# Patient Record
Sex: MALE | Race: WHITE | NOT HISPANIC OR LATINO | Employment: OTHER | URBAN - METROPOLITAN AREA
[De-identification: names, ages, dates, MRNs, and addresses within clinical notes are randomized per-mention and may not be internally consistent; named-entity substitution may affect disease eponyms.]

---

## 2022-09-26 ENCOUNTER — HOSPITAL ENCOUNTER (EMERGENCY)
Facility: HOSPITAL | Age: 52
Discharge: HOME | End: 2022-09-27
Attending: STUDENT IN AN ORGANIZED HEALTH CARE EDUCATION/TRAINING PROGRAM
Payer: MEDICARE

## 2022-09-26 DIAGNOSIS — M54.40 ACUTE LOW BACK PAIN WITH SCIATICA, SCIATICA LATERALITY UNSPECIFIED, UNSPECIFIED BACK PAIN LATERALITY: Primary | ICD-10-CM

## 2022-09-26 DIAGNOSIS — G89.29 CHRONIC BACK PAIN, UNSPECIFIED BACK LOCATION, UNSPECIFIED BACK PAIN LATERALITY: ICD-10-CM

## 2022-09-26 DIAGNOSIS — M54.9 CHRONIC BACK PAIN, UNSPECIFIED BACK LOCATION, UNSPECIFIED BACK PAIN LATERALITY: ICD-10-CM

## 2022-09-26 PROCEDURE — 99284 EMERGENCY DEPT VISIT MOD MDM: CPT | Mod: 25

## 2022-09-27 VITALS
WEIGHT: 180 LBS | HEART RATE: 92 BPM | HEIGHT: 70 IN | OXYGEN SATURATION: 96 % | BODY MASS INDEX: 25.77 KG/M2 | DIASTOLIC BLOOD PRESSURE: 101 MMHG | RESPIRATION RATE: 18 BRPM | SYSTOLIC BLOOD PRESSURE: 154 MMHG | TEMPERATURE: 98 F

## 2022-09-27 PROCEDURE — 63600175 PHARM REV CODE 636 W HCPCS: Performed by: STUDENT IN AN ORGANIZED HEALTH CARE EDUCATION/TRAINING PROGRAM

## 2022-09-27 PROCEDURE — 96372 THER/PROPH/DIAG INJ SC/IM: CPT | Performed by: STUDENT IN AN ORGANIZED HEALTH CARE EDUCATION/TRAINING PROGRAM

## 2022-09-27 RX ORDER — KETOROLAC TROMETHAMINE 30 MG/ML
60 INJECTION, SOLUTION INTRAMUSCULAR; INTRAVENOUS
Status: COMPLETED | OUTPATIENT
Start: 2022-09-27 | End: 2022-09-27

## 2022-09-27 RX ORDER — METHOCARBAMOL 500 MG/1
1000 TABLET, FILM COATED ORAL
Status: DISCONTINUED | OUTPATIENT
Start: 2022-09-27 | End: 2022-09-27 | Stop reason: HOSPADM

## 2022-09-27 RX ORDER — LIDOCAINE 50 MG/G
1 PATCH TOPICAL DAILY
Qty: 7 PATCH | Refills: 0 | Status: SHIPPED | OUTPATIENT
Start: 2022-09-27 | End: 2022-10-04

## 2022-09-27 RX ORDER — NAPROXEN 500 MG/1
500 TABLET ORAL 2 TIMES DAILY WITH MEALS
Qty: 60 TABLET | Refills: 0 | Status: SHIPPED | OUTPATIENT
Start: 2022-09-27

## 2022-09-27 RX ORDER — CYCLOBENZAPRINE HCL 10 MG
10 TABLET ORAL 3 TIMES DAILY PRN
Qty: 15 TABLET | Refills: 0 | Status: SHIPPED | OUTPATIENT
Start: 2022-09-27 | End: 2022-10-02

## 2022-09-27 RX ADMIN — KETOROLAC TROMETHAMINE 60 MG: 30 INJECTION, SOLUTION INTRAMUSCULAR at 01:09

## 2022-09-27 NOTE — ED PROVIDER NOTES
Encounter Date: 9/26/2022    SCRIBE #1 NOTE: I, Lisa Hall, am scribing for, and in the presence of,  Kasi Carreon IV, MD. I have scribed the following portions of the note - Other sections scribed: HPI, ROS, PE.     History     Chief Complaint   Patient presents with    Back Pain     Complaints of L lower back pain, radiating down L leg. Hx of spinal cord injury. Denies loss of bowel/bladder function     52 year old male wit hx of chronic back pain, chronic thoracic spinal compression deformities, and remote back surgery presents to ED with c/o back pain since last week. Pt described the pain as a burning sensation that radiates down his L leg that he has never experienced before. He state's its eased mildly by laying down and taking ibuprofen/tylenol. He denies any new weakness or urinary/bowel incontinence. He walks with a cane all the time.     No urinary retention or incontinence. No fecal incontinence or constipation. No saddle or perineal numbness. No recent surgeries, instrumentation, or injections involving the back. No history of IV drug use or cancer. No history of fevers or TB infection. Denies trauma.      The history is provided by the patient. No  was used.   Back Pain   This is a new problem. Illness onset: Last week. The problem has been unchanged. The quality of the pain is described as burning. The pain radiates to the left leg. Pertinent negatives include no chest pain, no fever, no abdominal pain, no dysuria and no weakness.   Review of patient's allergies indicates:  No Known Allergies  Past Medical History:   Diagnosis Date    Basal cell carcinoma     Nose    Cord compression     Herniated disc, cervical     Thoracic spinal stenosis      Past Surgical History:   Procedure Laterality Date    BACK SURGERY      LAMINECTOMY      T10-T12     Family History   Problem Relation Age of Onset    Arthritis Mother     Cancer Mother     COPD Mother     Cancer Father      Social  History     Tobacco Use    Smoking status: Every Day     Packs/day: 1.00     Types: Cigarettes   Substance Use Topics    Alcohol use: Yes     Alcohol/week: 3.0 standard drinks     Types: 3 Cans of beer per week    Drug use: No     Review of Systems   Constitutional:  Negative for chills and fever.   HENT:  Negative for congestion, rhinorrhea and sore throat.    Eyes:  Negative for visual disturbance.   Respiratory:  Negative for cough and shortness of breath.    Cardiovascular:  Negative for chest pain.   Gastrointestinal:  Negative for abdominal pain, nausea and vomiting.   Genitourinary:  Negative for dysuria and hematuria.   Musculoskeletal:  Positive for back pain. Negative for joint swelling.   Skin:  Negative for rash.   Neurological:  Negative for weakness.   Psychiatric/Behavioral:  Negative for confusion.    All other systems reviewed and are negative.    Physical Exam     Initial Vitals [09/26/22 2320]   BP Pulse Resp Temp SpO2   (!) 147/85 90 17 98.2 °F (36.8 °C) 96 %      MAP       --         Physical Exam    Nursing note and vitals reviewed.  Constitutional: He is not diaphoretic. No distress.   HENT:   Head: Normocephalic and atraumatic.   Eyes: EOM are normal. Pupils are equal, round, and reactive to light.   Neck: Neck supple.   Normal range of motion.  Pulmonary/Chest: No respiratory distress.   Abdominal: Abdomen is soft. He exhibits no distension. There is no abdominal tenderness.   Musculoskeletal:         General: Tenderness (paraspinal left lower back) present. No edema. Normal range of motion.      Cervical back: Normal range of motion and neck supple.      Comments: Pt is ambulating at baseline with a cane.      Neurological: He is alert and oriented to person, place, and time. He has normal strength. No cranial nerve deficit.   Skin: Skin is warm. No rash noted.   Psychiatric: He has a normal mood and affect.       ED Course   Procedures  Labs Reviewed - No data to display       Imaging  Results    None          Medications   ketorolac injection 60 mg (60 mg Intramuscular Given 9/27/22 0147)     Medical Decision Making:   History:   Old Medical Records: I decided to obtain old medical records.  Initial Assessment:   Chronic back pain - presenting with L paraspinal pain radiating down L leg. No weakness, incontinence, other red flags. Falling asleep in the room, pain controlled in ED. At baseline neurologically, ambulating with cane as is normal. Will discharge with pain regimen. Return precautions given.    Differential Diagnosis:   MSK pain, radiculopathy, chronic pain  ED Management:  IM toradol         Scribe Attestation:   Scribe #1: I performed the above scribed service and the documentation accurately describes the services I performed. I attest to the accuracy of the note.    Attending Attestation:           Physician Attestation for Scribe:  Physician Attestation Statement for Scribe #1: I, Kasi Carreon IV, MD, reviewed documentation, as scribed by Lisa Hall in my presence, and it is both accurate and complete.             ED Course as of 09/27/22 0530   Tue Sep 27, 2022   0238 Pain improved. Resting comfortably. Will discharge.  [AC]      ED Course User Index  [AC] Kasi Carreon IV, MD                 Clinical Impression:   Final diagnoses:  [M54.40] Acute low back pain with sciatica, sciatica laterality unspecified, unspecified back pain laterality (Primary)  [M54.9, G89.29] Chronic back pain, unspecified back location, unspecified back pain laterality        ED Disposition Condition    Discharge Stable          ED Prescriptions       Medication Sig Dispense Start Date End Date Auth. Provider    cyclobenzaprine (FLEXERIL) 10 MG tablet Take 1 tablet (10 mg total) by mouth 3 (three) times daily as needed for Muscle spasms. 15 tablet 9/27/2022 10/2/2022 Kasi Carreon IV, MD    LIDOcaine (LIDODERM) 5 % Place 1 patch onto the skin once daily. Remove & Discard patch within 12 hours or  as directed by MD for 7 days 7 patch 9/27/2022 10/4/2022 Kasi Carreon IV, MD    naproxen (NAPROSYN) 500 MG tablet Take 1 tablet (500 mg total) by mouth 2 (two) times daily with meals. 60 tablet 9/27/2022 -- Kasi Carreon IV, MD          Follow-up Information       Follow up With Specialties Details Why Contact Info    [facility] - Emergency Dept Emergency Medicine Go to  If symptoms worsen [address] [facility] [id number] [phone]    PCP  Schedule an appointment as soon as possible for a visit           I, Kasi Carreon MD personally performed the history, PE, MDM, and procedures as documented above and agree with the scribe's documentation.        Kasi Carreon IV, MD  09/27/22 0511

## 2022-10-22 ENCOUNTER — HOSPITAL ENCOUNTER (EMERGENCY)
Facility: HOSPITAL | Age: 52
Discharge: HOME | End: 2022-10-22
Attending: STUDENT IN AN ORGANIZED HEALTH CARE EDUCATION/TRAINING PROGRAM

## 2022-10-22 VITALS
SYSTOLIC BLOOD PRESSURE: 158 MMHG | TEMPERATURE: 98 F | OXYGEN SATURATION: 98 % | BODY MASS INDEX: 25.25 KG/M2 | DIASTOLIC BLOOD PRESSURE: 103 MMHG | RESPIRATION RATE: 24 BRPM | WEIGHT: 176.38 LBS | HEART RATE: 99 BPM | HEIGHT: 70 IN

## 2022-10-22 DIAGNOSIS — N39.0 URINARY TRACT INFECTION WITHOUT HEMATURIA, SITE UNSPECIFIED: ICD-10-CM

## 2022-10-22 DIAGNOSIS — N50.812 PAIN IN LEFT TESTICLE: ICD-10-CM

## 2022-10-22 DIAGNOSIS — G89.29 CHRONIC BILATERAL LOW BACK PAIN WITHOUT SCIATICA: Primary | ICD-10-CM

## 2022-10-22 DIAGNOSIS — M54.50 CHRONIC BILATERAL LOW BACK PAIN WITHOUT SCIATICA: Primary | ICD-10-CM

## 2022-10-22 LAB
ALBUMIN SERPL-MCNC: 4.2 GM/DL (ref 3.5–5)
ALBUMIN/GLOB SERPL: 1.4 RATIO (ref 1.1–2)
ALP SERPL-CCNC: 69 UNIT/L (ref 40–150)
ALT SERPL-CCNC: 28 UNIT/L (ref 0–55)
AMPHET UR QL SCN: POSITIVE
APPEARANCE UR: CLEAR
AST SERPL-CCNC: 29 UNIT/L (ref 5–34)
BACTERIA #/AREA URNS AUTO: ABNORMAL /HPF
BARBITURATE SCN PRESENT UR: NEGATIVE
BASOPHILS # BLD AUTO: 0.04 X10(3)/MCL (ref 0–0.2)
BASOPHILS NFR BLD AUTO: 0.4 %
BENZODIAZ UR QL SCN: NEGATIVE
BILIRUB UR QL STRIP.AUTO: NEGATIVE MG/DL
BILIRUBIN DIRECT+TOT PNL SERPL-MCNC: 0.6 MG/DL
BUN SERPL-MCNC: 15.2 MG/DL (ref 8.4–25.7)
C TRACH DNA SPEC QL NAA+PROBE: NOT DETECTED
CALCIUM SERPL-MCNC: 9.8 MG/DL (ref 8.4–10.2)
CANNABINOIDS UR QL SCN: POSITIVE
CAOX CRY URNS QL MICRO: ABNORMAL /HPF
CHLORIDE SERPL-SCNC: 100 MMOL/L (ref 98–107)
CO2 SERPL-SCNC: 23 MMOL/L (ref 22–29)
COCAINE UR QL SCN: NEGATIVE
COLOR UR AUTO: YELLOW
CREAT SERPL-MCNC: 0.98 MG/DL (ref 0.73–1.18)
EOSINOPHIL # BLD AUTO: 0.01 X10(3)/MCL (ref 0–0.9)
EOSINOPHIL NFR BLD AUTO: 0.1 %
ERYTHROCYTE [DISTWIDTH] IN BLOOD BY AUTOMATED COUNT: 13.9 % (ref 11.5–17)
FENTANYL UR QL SCN: POSITIVE
FLUAV AG UPPER RESP QL IA.RAPID: NOT DETECTED
FLUBV AG UPPER RESP QL IA.RAPID: NOT DETECTED
GFR SERPLBLD CREATININE-BSD FMLA CKD-EPI: >60 MLS/MIN/1.73/M2
GLOBULIN SER-MCNC: 3 GM/DL (ref 2.4–3.5)
GLUCOSE SERPL-MCNC: 100 MG/DL (ref 74–100)
GLUCOSE UR QL STRIP.AUTO: NORMAL MG/DL
HCT VFR BLD AUTO: 44.3 % (ref 42–52)
HGB BLD-MCNC: 14.5 GM/DL (ref 14–18)
HYALINE CASTS #/AREA URNS LPF: ABNORMAL /LPF
IMM GRANULOCYTES # BLD AUTO: 0.05 X10(3)/MCL (ref 0–0.04)
IMM GRANULOCYTES NFR BLD AUTO: 0.4 %
KETONES UR QL STRIP.AUTO: ABNORMAL MG/DL
LEUKOCYTE ESTERASE UR QL STRIP.AUTO: 25 UNIT/L
LYMPHOCYTES # BLD AUTO: 1.21 X10(3)/MCL (ref 0.6–4.6)
LYMPHOCYTES NFR BLD AUTO: 10.7 %
MCH RBC QN AUTO: 30.8 PG (ref 27–31)
MCHC RBC AUTO-ENTMCNC: 32.7 MG/DL (ref 33–36)
MCV RBC AUTO: 94.1 FL (ref 80–94)
MDMA UR QL SCN: NEGATIVE
MONOCYTES # BLD AUTO: 0.73 X10(3)/MCL (ref 0.1–1.3)
MONOCYTES NFR BLD AUTO: 6.5 %
MUCOUS THREADS URNS QL MICRO: ABNORMAL /LPF
N GONORRHOEA DNA SPEC QL NAA+PROBE: NOT DETECTED
NEUTROPHILS # BLD AUTO: 9.2 X10(3)/MCL (ref 2.1–9.2)
NEUTROPHILS NFR BLD AUTO: 81.9 %
NITRITE UR QL STRIP.AUTO: NEGATIVE
NRBC BLD AUTO-RTO: 0 %
OPIATES UR QL SCN: NEGATIVE
PCP UR QL: NEGATIVE
PH UR STRIP.AUTO: 5.5 [PH]
PH UR: 5.5 [PH] (ref 3–11)
PLATELET # BLD AUTO: 233 X10(3)/MCL (ref 130–400)
PMV BLD AUTO: 11.5 FL (ref 7.4–10.4)
POTASSIUM SERPL-SCNC: 3.8 MMOL/L (ref 3.5–5.1)
PROT SERPL-MCNC: 7.2 GM/DL (ref 6.4–8.3)
PROT UR QL STRIP.AUTO: ABNORMAL MG/DL
RBC # BLD AUTO: 4.71 X10(6)/MCL (ref 4.7–6.1)
RBC #/AREA URNS AUTO: ABNORMAL /HPF
RBC UR QL AUTO: NEGATIVE UNIT/L
SARS-COV-2 RNA RESP QL NAA+PROBE: NOT DETECTED
SODIUM SERPL-SCNC: 136 MMOL/L (ref 136–145)
SP GR UR STRIP.AUTO: 1.03
SPECIFIC GRAVITY, URINE AUTO (.000) (OHS): 1.03 (ref 1–1.03)
SPERM URNS QL MICRO: ABNORMAL /HPF
SQUAMOUS #/AREA URNS LPF: ABNORMAL /HPF
UROBILINOGEN UR STRIP-ACNC: ABNORMAL MG/DL
WBC # SPEC AUTO: 11.3 X10(3)/MCL (ref 4.5–11.5)
WBC #/AREA URNS AUTO: ABNORMAL /HPF

## 2022-10-22 PROCEDURE — 87591 N.GONORRHOEAE DNA AMP PROB: CPT | Performed by: STUDENT IN AN ORGANIZED HEALTH CARE EDUCATION/TRAINING PROGRAM

## 2022-10-22 PROCEDURE — 81001 URINALYSIS AUTO W/SCOPE: CPT | Mod: 59 | Performed by: STUDENT IN AN ORGANIZED HEALTH CARE EDUCATION/TRAINING PROGRAM

## 2022-10-22 PROCEDURE — 80053 COMPREHEN METABOLIC PANEL: CPT | Performed by: STUDENT IN AN ORGANIZED HEALTH CARE EDUCATION/TRAINING PROGRAM

## 2022-10-22 PROCEDURE — 80307 DRUG TEST PRSMV CHEM ANLYZR: CPT | Performed by: STUDENT IN AN ORGANIZED HEALTH CARE EDUCATION/TRAINING PROGRAM

## 2022-10-22 PROCEDURE — 99285 EMERGENCY DEPT VISIT HI MDM: CPT | Mod: 25

## 2022-10-22 PROCEDURE — 63600175 PHARM REV CODE 636 W HCPCS: Performed by: STUDENT IN AN ORGANIZED HEALTH CARE EDUCATION/TRAINING PROGRAM

## 2022-10-22 PROCEDURE — 96372 THER/PROPH/DIAG INJ SC/IM: CPT | Mod: 59 | Performed by: STUDENT IN AN ORGANIZED HEALTH CARE EDUCATION/TRAINING PROGRAM

## 2022-10-22 PROCEDURE — 96361 HYDRATE IV INFUSION ADD-ON: CPT

## 2022-10-22 PROCEDURE — 25500020 PHARM REV CODE 255: Performed by: STUDENT IN AN ORGANIZED HEALTH CARE EDUCATION/TRAINING PROGRAM

## 2022-10-22 PROCEDURE — 0241U COVID/FLU A&B PCR: CPT | Performed by: STUDENT IN AN ORGANIZED HEALTH CARE EDUCATION/TRAINING PROGRAM

## 2022-10-22 PROCEDURE — 96374 THER/PROPH/DIAG INJ IV PUSH: CPT | Mod: 59

## 2022-10-22 PROCEDURE — 36415 COLL VENOUS BLD VENIPUNCTURE: CPT | Performed by: STUDENT IN AN ORGANIZED HEALTH CARE EDUCATION/TRAINING PROGRAM

## 2022-10-22 PROCEDURE — 85025 COMPLETE CBC W/AUTO DIFF WBC: CPT | Performed by: STUDENT IN AN ORGANIZED HEALTH CARE EDUCATION/TRAINING PROGRAM

## 2022-10-22 PROCEDURE — 25000003 PHARM REV CODE 250: Performed by: STUDENT IN AN ORGANIZED HEALTH CARE EDUCATION/TRAINING PROGRAM

## 2022-10-22 PROCEDURE — 63700000 PHARM REV CODE 250 ALT 637 W/O HCPCS: Performed by: STUDENT IN AN ORGANIZED HEALTH CARE EDUCATION/TRAINING PROGRAM

## 2022-10-22 PROCEDURE — 87491 CHLMYD TRACH DNA AMP PROBE: CPT | Performed by: STUDENT IN AN ORGANIZED HEALTH CARE EDUCATION/TRAINING PROGRAM

## 2022-10-22 RX ORDER — CEPHALEXIN 500 MG/1
500 CAPSULE ORAL 4 TIMES DAILY
Qty: 20 CAPSULE | Refills: 0 | Status: SHIPPED | OUTPATIENT
Start: 2022-10-22 | End: 2022-10-27

## 2022-10-22 RX ORDER — ACETAMINOPHEN 500 MG
1000 TABLET ORAL
Status: COMPLETED | OUTPATIENT
Start: 2022-10-22 | End: 2022-10-22

## 2022-10-22 RX ORDER — AZITHROMYCIN 250 MG/1
1000 TABLET, FILM COATED ORAL
Status: COMPLETED | OUTPATIENT
Start: 2022-10-22 | End: 2022-10-22

## 2022-10-22 RX ORDER — CEFTRIAXONE 500 MG/1
500 INJECTION, POWDER, FOR SOLUTION INTRAMUSCULAR; INTRAVENOUS
Status: COMPLETED | OUTPATIENT
Start: 2022-10-22 | End: 2022-10-22

## 2022-10-22 RX ORDER — KETOROLAC TROMETHAMINE 30 MG/ML
15 INJECTION, SOLUTION INTRAMUSCULAR; INTRAVENOUS
Status: COMPLETED | OUTPATIENT
Start: 2022-10-22 | End: 2022-10-22

## 2022-10-22 RX ORDER — HYDROCODONE BITARTRATE AND ACETAMINOPHEN 5; 325 MG/1; MG/1
1 TABLET ORAL EVERY 6 HOURS PRN
Qty: 10 TABLET | Refills: 0 | Status: SHIPPED | OUTPATIENT
Start: 2022-10-22

## 2022-10-22 RX ADMIN — SODIUM CHLORIDE 1000 ML: 9 INJECTION, SOLUTION INTRAVENOUS at 10:10

## 2022-10-22 RX ADMIN — ACETAMINOPHEN 1000 MG: 500 TABLET ORAL at 08:10

## 2022-10-22 RX ADMIN — KETOROLAC TROMETHAMINE 15 MG: 30 INJECTION, SOLUTION INTRAMUSCULAR at 08:10

## 2022-10-22 RX ADMIN — AZITHROMYCIN MONOHYDRATE 1000 MG: 250 TABLET ORAL at 10:10

## 2022-10-22 RX ADMIN — IOPAMIDOL 100 ML: 755 INJECTION, SOLUTION INTRAVENOUS at 09:10

## 2022-10-22 RX ADMIN — CEFTRIAXONE SODIUM 500 MG: 500 INJECTION, POWDER, FOR SOLUTION INTRAMUSCULAR; INTRAVENOUS at 10:10

## 2022-10-23 NOTE — ED PROVIDER NOTES
"Encounter Date: 10/22/2022       History     Chief Complaint   Patient presents with    Testicle Pain    Back Pain    Psychiatric Evaluation     C/o constant pain in back, testicular pain, depression, si     52-year-old male presents to ED for chronic lower back pain and left testicle pain.  States last time he had this testicle pain he had gonorrhea. denies any dysuria, no swelling, no trauma, no right testicle involvement.  No skin changes, no lymphadenopathy. endorsing a mild suprapubic abdominal discomfort when his testicle hurts.  Denies any rectal changes. Also reports a chronic generalized back pain with previous surgeries.  No new trauma. States he used to be on pain med management to [facility] however has not seen them for a long period time.  Has not taken anything for his back pain. states in triage he made a comment that if we cannot help with his back pain he would "do something about it" referring to harming himself.  He states he was/is not suicidal, did not mean this.  He denies any hallucinations or other psychiatric complaints.  No other complaints or concerns at this time.    Review of patient's allergies indicates:  No Known Allergies  Past Medical History:   Diagnosis Date    Basal cell carcinoma     Nose    Cord compression     Herniated disc, cervical     Thoracic spinal stenosis      Past Surgical History:   Procedure Laterality Date    BACK SURGERY      LAMINECTOMY      T10-T12     Family History   Problem Relation Age of Onset    Arthritis Mother     Cancer Mother     COPD Mother     Cancer Father      Social History     Tobacco Use    Smoking status: Every Day     Packs/day: 1.00     Types: Cigarettes    Smokeless tobacco: Never   Substance Use Topics    Alcohol use: Yes     Alcohol/week: 3.0 standard drinks     Types: 3 Cans of beer per week    Drug use: No     Review of Systems   Constitutional:  Negative for chills and fever.   HENT:  Negative for congestion, rhinorrhea and sore " throat.    Eyes:  Negative for pain, discharge and itching.   Respiratory:  Negative for chest tightness and shortness of breath.    Cardiovascular:  Negative for chest pain and palpitations.   Gastrointestinal:  Negative for abdominal pain, nausea and vomiting.   Genitourinary:  Positive for testicular pain. Negative for difficulty urinating, dysuria, flank pain, hematuria, penile discharge, penile pain, penile swelling, scrotal swelling and urgency.   Musculoskeletal:  Positive for back pain. Negative for myalgias and neck pain.   Skin:  Negative for color change and rash.   Neurological:  Negative for dizziness, weakness and headaches.   Psychiatric/Behavioral:  Negative for confusion and suicidal ideas. The patient is not hyperactive.      Physical Exam     Initial Vitals [10/22/22 0740]   BP Pulse Resp Temp SpO2   (!) 158/87 99 (!) 24 97.5 °F (36.4 °C) 98 %      MAP       --         Physical Exam    Constitutional: He appears well-developed and well-nourished. He is not diaphoretic. No distress.   HENT:   Head: Normocephalic and atraumatic.   Eyes: Conjunctivae and EOM are normal. Pupils are equal, round, and reactive to light.   Neck: Neck supple. No tracheal deviation present.   Normal range of motion.  Cardiovascular:  Normal rate, regular rhythm and normal heart sounds.           Pulmonary/Chest: Breath sounds normal. No respiratory distress.   Abdominal: Abdomen is soft. There is no abdominal tenderness. Hernia confirmed negative in the right inguinal area and confirmed negative in the left inguinal area.   No right CVA tenderness.  No left CVA tenderness. There is no rebound, no guarding, no tenderness at McBurney's point and negative Aguilar's sign. negative Rovsing's sign  Genitourinary:    Penis normal.   Right testis shows no mass, no swelling and no tenderness. Right testis is descended. Cremasteric reflex is not absent on the right side. Left testis shows tenderness. Left testis shows no mass and no  swelling. Left testis is descended. Cremasteric reflex is not absent on the left side. No penile erythema or penile tenderness. No discharge found.   Musculoskeletal:         General: Normal range of motion.      Cervical back: Normal, normal range of motion and neck supple.      Thoracic back: Normal.      Lumbar back: Spasms and tenderness present. No edema, signs of trauma or bony tenderness. Normal range of motion. Negative right straight leg raise test and negative left straight leg raise test.     Lymphadenopathy: No inguinal adenopathy noted on the right or left side.   Neurological: He is alert and oriented to person, place, and time. He has normal strength. GCS score is 15. GCS eye subscore is 4. GCS verbal subscore is 5. GCS motor subscore is 6.   Skin: Skin is warm and dry. Capillary refill takes less than 2 seconds. No rash noted.   Psychiatric: He has a normal mood and affect. His speech is normal and behavior is normal. Judgment and thought content normal. He is not actively hallucinating. Thought content is not paranoid and not delusional. Cognition and memory are normal. He expresses no homicidal and no suicidal ideation. He expresses no suicidal plans and no homicidal plans. He is attentive.       ED Course   Procedures  Labs Reviewed   URINALYSIS, REFLEX TO URINE CULTURE - Abnormal; Notable for the following components:       Result Value    Protein, UA Trace (*)     Ketones, UA 1+ (*)     Urobilinogen, UA 1+ (*)     Leukocyte Esterase, UA 25 (*)     WBC, UA 6-10 (*)     Bacteria, UA Occ (*)     Mucous, UA Few (*)     Sperm, UA Trace (*)     Hyaline Casts, UA 3-5 (*)     Calcium Oxalate Crystals, UA Occ (*)     All other components within normal limits   DRUG SCREEN, URINE (BEAKER) - Abnormal; Notable for the following components:    Amphetamines, Urine Positive (*)     Cannabinoids, Urine Positive (*)     Fentanyl, Urine Positive (*)     All other components within normal limits    Narrative:      Cut off concentrations:    Amphetamines - 1000 ng/ml  Barbiturates - 200 ng/ml  Benzodiazepine - 200 ng/ml  Cannabinoids (THC) - 50 ng/ml  Cocaine - 300 ng/ml  Fentanyl - 1.0 ng/ml  MDMA - 500 ng/ml  Opiates - 300 ng/ml   Phencyclidine (PCP) - 25 ng/ml    Specimen submitted for drug analysis and tested for pH and specific gravity in order to evaluate sample integrity. Suspect tampering if specific gravity is <1.003 and/or pH is not within the range of 4.5 - 8.0  False negatives may result form substances such as bleach added to urine.  False positives may result for the presence of a substance with similar chemical structure to the drug or its metabolite.    This test provides only a PRELIMINARY analytical test result. A more specific alternate chemical method must be used in order to obtain a confirmed analytical result. Gas chromatography/mass spectrometry (GC/MS) is the preferred confirmatory method. Other chemical confirmation methods are available. Clinical consideration and professional judgement should be applied to any drug of abuse test result, particularly when preliminary positive results are used.    Positive results will be confirmed only at the physicians request. Unconfirmed screening results are to be used only for medical purposes (treatment).        CBC WITH DIFFERENTIAL - Abnormal; Notable for the following components:    MCV 94.1 (*)     MCHC 32.7 (*)     MPV 11.5 (*)     IG# 0.05 (*)     All other components within normal limits   CHLAMYDIA/GONORRHOEAE(GC), PCR - Normal    Narrative:     The Xpert CT/NG test, performed on the GeneXpert system is a qualitative in vitro real-time polymerase chain reaction (PCR) test for the automated detected and differentiation for genomic DNA from Chlamydia trachomatis (CT) and/or Neisseria gonorrhoeae (NG).   COVID/FLU A&B PCR - Normal    Narrative:     The Xpert Xpress SARS-CoV-2/FLU/RSV plus is a rapid, multiplexed real-time PCR test intended for the  simultaneous qualitative detection and differentiation of SARS-CoV-2, Influenza A, Influenza B, and respiratory syncytial virus (RSV) viral RNA in either nasopharyngeal swab or nasal swab specimens.         CBC W/ AUTO DIFFERENTIAL    Narrative:     The following orders were created for panel order CBC auto differential.  Procedure                               Abnormality         Status                     ---------                               -----------         ------                     CBC with Differential[[id number]]        Abnormal            Final result                 Please view results for these tests on the individual orders.   COMPREHENSIVE METABOLIC PANEL          Imaging Results              CT Abdomen Pelvis With Contrast (Final result)  Result time 10/22/22 10:11:52      Final result by Natanael Pereira MD (10/22/22 10:11:52)                   Narrative:    EXAMINATION  CT ABDOMEN PELVIS WITH CONTRAST    CLINICAL HISTORY  chronic back pain, lower abdominal & testiclar pain;    TECHNIQUE  Post-contrast helical-acquisition CT images were obtained and multiplanar reformats accomplished by a CT technologist at a separate workstation, pushed to PACS for physician review.    CONTRAST  *IV: ISOVUE-370, 100 mL  *Enteric: none    COMPARISON  None available at the time of initial interpretation.    FINDINGS  Images were reviewed in soft tissue, lung, and bone windows.    Exam quality: adequate for evaluation    Lines/tubes: none visualized    Visualized lower thoracic cavity is unremarkable.  Aortoiliac tapering is normal through the abdomen and pelvis, with scattered moderate burden mural plaquing and calcification.    The gallbladder and bile ducts are unremarkable.  Portal vein is widely patent.  Liver, pancreas, and spleen are normal in appearance.  There is no suspicious adrenal nodule.  Kidneys enhance in temporally symmetric fashion, with no suspicious focal lesion or radiodense calcification.  There  are no findings of distal obstructive uropathy.    Urinary bladder is mildly distended, with greater than expected diffuse mural thickening.  No focal bladder wall abnormality or convincing intraluminal findings are appreciated.  The prostate is mildly prominent with diffuse central calcification, otherwise limited assessment by CT.    No evidence of acute esophageal process.  The stomach and small bowel loops are nondistended.  The appendix is unremarkable.  There is no convincing focal abnormality of the colon.  No free intra-abdominal air or fluid is appreciated.  There are no drainable collections.    No pathologic lymph node enlargement or chronic adenopathy is evident.    There are degenerative alterations throughout the spinal column, with no evidence of acute cortical displacement or destructive skeletal lesion.  The regional subcutaneous tissues and underlying musculature are unremarkable.    IMPRESSION  1. Greater than expected thickness of the urinary bladder wall, may reflect changes of cystitis.  Correlation with pertinent clinical/laboratory details recommended.  2. Otherwise, no convincing CT evidence of acute abdominopelvic process.  3. Chronic secondary details discussed above.    RADIATION DOSE  Automated tube current modulation, weight-based exposure dosing, and/or iterative reconstruction technique utilized to reach lowest reasonably achievable exposure rate.    DLP: 289 mGy*cm      Electronically signed by: Natanael Pereira  Date:    10/22/2022  Time:    10:11                                     US Scrotum And Testicles (Final result)  Result time 10/22/22 09:17:47      Final result by Natanael Pereira MD (10/22/22 09:17:47)                   Narrative:    EXAMINATION  US SCROTUM AND TESTICLES    CLINICAL HISTORY  Pain, unspecified; left testicular pain worsening for several months.    TECHNIQUE  Grayscale and Doppler interrogation of the scrotum and testes.    COMPARISON  None available at the time  of initial interpretation.    FINDINGS  Exam quality: Limited secondary to poor patient compliance with imaging, resulting and constant motion artifact throughout the exam.    Right Testicle: Surface contour intact and smooth. No evidence of focal mass or infiltrative parenchymal abnormality.  No significant enlargement or heterogeneity of the epididymis.    Left Testicle: Surface contour intact and smooth. No evidence of focal mass or infiltrative parenchymal abnormality.  No significant enlargement or heterogeneity of the epididymis.    Doppler Interrogation:  Bilateral spontaneous arterial flow is demonstrated within each testicle, with symmetric velocity and normal low-resistance spectral waveform.  Bilateral venous outflow is maintained.    Other findings: No significant hydrocele or other abnormal fluid appreciated.  No varicocele appreciated bilaterally.      Measurements:    *Right testicle: 5.5 cm x 2.3 cm x 3.4 cm (22 cc)  *Left testicle: 4.2 cm x 2.9 cm x 2.9 cm (19 cc)    IMPRESSION  No sonographic evidence of focal testicular lesion, active torsion, or other acute process.      Electronically signed by: Natanael Pereira  Date:    10/22/2022  Time:    09:17                                     Medications   ketorolac injection 15 mg (15 mg Intravenous Given 10/22/22 0813)   acetaminophen tablet 1,000 mg (1,000 mg Oral Given 10/22/22 0813)   sodium chloride 0.9% bolus 1,000 mL (0 mLs Intravenous Stopped 10/22/22 1100)   iopamidoL (ISOVUE-370) injection 100 mL (100 mLs Intravenous Given 10/22/22 0953)   azithromycin tablet 1,000 mg (1,000 mg Oral Given 10/22/22 1050)   cefTRIAXone injection 500 mg (500 mg Intramuscular Given 10/22/22 1050)     Medical Decision Making:   Clinical Tests:   Lab Tests: Ordered and Reviewed  Radiological Study: Reviewed and Ordered  ED Management:  52-year-old male with hx of chronic back pain off pain contract presents for persistent generalized back pain and new left-sided  testicular discomfort consistent with prior gonorrhea infection.  No trauma, no discharge or drainage, no rectal complaints of historical elements consistent with prostatitis.  On arrival vitals grossly stable and in no acute distress.  Exam demonstrates mild generalized lower lumbar discomfort.  No midline pain or evidence of trauma.  Lower extremities neurovascularly intact.   exam benign besides very mild left testicular discomfort.  Labs demonstrated obvious urinary tract infection and multiple drugs on UDS.  No leukocytosis. CT abdomen and testicular ultrasound full both negative acute. provided pain medication in department and prophylactic GC coverage prior to return of PCR (neg). He did make fleeting remarks of self-harm which he quickly retracted after he stated he used times get back faster.  Prescribed urinary antibiotics and several prescribed pain medicines. Ultimately requires close PCP follow-up. Provided strict return precautions which he voiced understanding to and stable for discharge. (Marie)                         Clinical Impression:   Final diagnoses:  [M54.50, G89.29] Chronic bilateral low back pain without sciatica (Primary)  [N50.812] Pain in left testicle  [N39.0] Urinary tract infection without hematuria, site unspecified        ED Disposition Condition    Discharge Stable          ED Prescriptions       Medication Sig Dispense Start Date End Date Auth. Provider    cephALEXin (KEFLEX) 500 MG capsule () Take 1 capsule (500 mg total) by mouth 4 (four) times daily. for 5 days 20 capsule 10/22/2022 10/27/2022 Justin Salazar MD    HYDROcodone-acetaminophen (NORCO) 5-325 mg per tablet Take 1 tablet by mouth every 6 (six) hours as needed for Pain. 10 tablet 10/22/2022 -- Justin Salazar MD          Follow-up Information       Follow up With Specialties Details Why Contact Info    [facility] - Emergency Dept Emergency Medicine  As needed, If symptoms worsen [address]  [place] [id number]  [phone]    PCP  Schedule an appointment as soon as possible for a visit in 3 days               Justin Salazar MD  11/04/22 1948

## 2022-12-09 ENCOUNTER — HOSPITAL ENCOUNTER (EMERGENCY)
Facility: HOSPITAL | Age: 52
Discharge: HOME | End: 2022-12-10
Attending: EMERGENCY MEDICINE

## 2022-12-09 DIAGNOSIS — M25.552 LEFT HIP PAIN: Primary | ICD-10-CM

## 2022-12-09 PROCEDURE — 99283 EMERGENCY DEPT VISIT LOW MDM: CPT | Mod: 25

## 2022-12-10 VITALS
TEMPERATURE: 98 F | WEIGHT: 180 LBS | DIASTOLIC BLOOD PRESSURE: 74 MMHG | OXYGEN SATURATION: 98 % | SYSTOLIC BLOOD PRESSURE: 116 MMHG | HEIGHT: 70 IN | RESPIRATION RATE: 19 BRPM | HEART RATE: 94 BPM | BODY MASS INDEX: 25.77 KG/M2

## 2022-12-10 PROCEDURE — 25000003 PHARM REV CODE 250: Performed by: EMERGENCY MEDICINE

## 2022-12-10 RX ORDER — HYDROCODONE BITARTRATE AND ACETAMINOPHEN 5; 325 MG/1; MG/1
1 TABLET ORAL
Status: COMPLETED | OUTPATIENT
Start: 2022-12-10 | End: 2022-12-10

## 2022-12-10 RX ORDER — MELOXICAM 15 MG/1
15 TABLET ORAL DAILY
Qty: 20 TABLET | Refills: 0 | Status: SHIPPED | OUTPATIENT
Start: 2022-12-10 | End: 2022-12-30

## 2022-12-10 RX ADMIN — HYDROCODONE BITARTRATE AND ACETAMINOPHEN 1 TABLET: 5; 325 TABLET ORAL at 04:12

## 2022-12-10 RX ADMIN — IBUPROFEN 800 MG: 200 TABLET, FILM COATED ORAL at 04:12

## 2022-12-10 NOTE — ED PROVIDER NOTES
"Encounter Date: 12/9/2022    SCRIBE #1 NOTE: I, Vonnie Cade, am scribing for, and in the presence of,  Ted Bunch MD. I have scribed the following portions of the note - Other sections scribed: HPI, ROS, PE.     History     Chief Complaint   Patient presents with    Hip Pain     Patient sustained a fall a few day ago.  Complaint of pain, swelling, bruising to left hip.  States can only walk a few steps.       52 year old male with hx of a spinal cord injury presents to the ED for hip pain onset a few days ago. Pt reports he recently fell onto his L hip. He complains of progressive swelling and bruising of his hip. He reports his L leg "feels like it is on fire" when he puts weight onto it. No other complaints.     The history is provided by the patient. No  was used.   Hip Pain  This is a new problem. The current episode started more than 2 days ago. The problem has been gradually worsening. Pertinent negatives include no chest pain, no abdominal pain, no headaches and no shortness of breath. The symptoms are aggravated by walking. He has tried nothing for the symptoms. The treatment provided no relief.   Review of patient's allergies indicates:  No Known Allergies  Past Medical History:   Diagnosis Date    Basal cell carcinoma     Nose    Cord compression     Herniated disc, cervical     Thoracic spinal stenosis      Past Surgical History:   Procedure Laterality Date    BACK SURGERY      LAMINECTOMY      T10-T12     Family History   Problem Relation Age of Onset    Arthritis Mother     Cancer Mother     COPD Mother     Cancer Father      Social History     Tobacco Use    Smoking status: Every Day     Packs/day: 1.00     Types: Cigarettes    Smokeless tobacco: Never   Substance Use Topics    Alcohol use: Yes     Alcohol/week: 3.0 standard drinks     Types: 3 Cans of beer per week    Drug use: No     Review of Systems   Constitutional:  Negative for chills and fever.   HENT:  Negative " for congestion and ear pain.    Eyes:  Negative for discharge.   Respiratory:  Negative for cough, shortness of breath and wheezing.    Cardiovascular:  Negative for chest pain and leg swelling.   Gastrointestinal:  Negative for abdominal pain, constipation, diarrhea, nausea and vomiting.   Genitourinary:  Negative for dysuria, flank pain and frequency.   Musculoskeletal:  Negative for back pain and joint swelling.        L hip pain, swelling, and bruising.   Skin:  Negative for rash.   Neurological:  Negative for dizziness, weakness and headaches.   Psychiatric/Behavioral:  Negative for agitation, confusion and hallucinations.      Physical Exam     Initial Vitals [12/09/22 2311]   BP Pulse Resp Temp SpO2   116/74 94 20 98.4 °F (36.9 °C) 98 %      MAP       --         Physical Exam    Constitutional: He appears well-developed and well-nourished. No distress.   HENT:   Head: Normocephalic and atraumatic.   Eyes: Conjunctivae and EOM are normal. Pupils are equal, round, and reactive to light. Right eye exhibits no discharge. Left eye exhibits no discharge. No scleral icterus.   Neck: No tracheal deviation present.   Cardiovascular:  Normal rate, regular rhythm, normal heart sounds and intact distal pulses.           No murmur heard.  Pulmonary/Chest: Breath sounds normal. No stridor. No respiratory distress. He has no wheezes. He has no rales.   Good pulses of LLE.    Abdominal: Abdomen is soft. He exhibits no distension. There is no abdominal tenderness. There is no rebound and no guarding.   Musculoskeletal:         General: No tenderness or edema. Normal range of motion.      Comments: Large hematoma and bruising over L hip. Full ROM of hip. Able to flex and extend.      Neurological: He is alert and oriented to person, place, and time. He has normal strength and normal reflexes. No cranial nerve deficit.   Skin: Skin is warm and dry. No rash noted. No erythema. No pallor.   Psychiatric: He has a normal mood and  affect. His behavior is normal. Judgment and thought content normal.       ED Course   Procedures  Labs Reviewed - No data to display       Imaging Results              X-Ray Hip 2 or 3 views Left (with Pelvis when performed) (Final result)  Result time 12/10/22 10:02:57      Final result by Kendal Emery MD (12/10/22 10:02:57)                   Impression:      No acute osseous abnormality.      Electronically signed by: Kendal Emery  Date:    12/10/2022  Time:    10:02               Narrative:    EXAMINATION:  XR HIP WITH PELVIS WHEN PERFORMED, 2 OR 3 VIEWS LEFT    CLINICAL HISTORY:  fall;    TECHNIQUE:  AP view of the pelvis and AP and frog leg lateral view of the left hip were performed.    COMPARISON:  None.    FINDINGS:  BONE: No fracture. No dislocation.    SOFT TISSUES: Unremarkable.                                    X-Rays:   Independently Interpreted Readings:   Other Readings:  X-Ray  Negative  Medications   HYDROcodone-acetaminophen 5-325 mg per tablet 1 tablet (1 tablet Oral Given 12/10/22 0402)   ibuprofen tablet 800 mg (800 mg Oral Given 12/10/22 0403)              Scribe Attestation:   Scribe #1: I performed the above scribed service and the documentation accurately describes the services I performed. I attest to the accuracy of the note.    Attending Attestation:           Physician Attestation for Scribe:  Physician Attestation Statement for Scribe #1: I, Ted Bunch MD, reviewed documentation, as scribed by Vonnie Cade in my presence, and it is both accurate and complete.           ED Course as of 12/11/22 0650   Sat Dec 10, 2022   0410 Pt ambulating in ER without assistance [NL]      ED Course User Index  [NL] Ted Bunch MD                 Clinical Impression:   Final diagnoses:  [M25.552] Left hip pain (Primary)        ED Disposition Condition    Discharge Stable          ED Prescriptions       Medication Sig Dispense Start Date End Date Auth. Provider     meloxicam (MOBIC) 15 MG tablet Take 1 tablet (15 mg total) by mouth once daily. for 20 days 20 tablet 12/10/2022 12/30/2022 Ted Bunch MD          Follow-up Information       Follow up With Specialties Details Why Contact Info    PCP  Call in 1 day  follow up with PCP ni 1-2 days             Ted Bunch MD  12/11/22 3576

## 2022-12-22 ENCOUNTER — HOSPITAL ENCOUNTER (EMERGENCY)
Facility: HOSPITAL | Age: 52
Discharge: HOME | End: 2022-12-22
Attending: EMERGENCY MEDICINE

## 2022-12-22 VITALS
BODY MASS INDEX: 23.99 KG/M2 | SYSTOLIC BLOOD PRESSURE: 122 MMHG | DIASTOLIC BLOOD PRESSURE: 82 MMHG | WEIGHT: 167.56 LBS | TEMPERATURE: 98 F | HEIGHT: 70 IN | HEART RATE: 85 BPM | RESPIRATION RATE: 16 BRPM | OXYGEN SATURATION: 100 %
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
## 2022-12-22 DIAGNOSIS — S16.1XXA ACUTE STRAIN OF NECK MUSCLE, INITIAL ENCOUNTER: ICD-10-CM

## 2022-12-22 DIAGNOSIS — S00.83XA CONTUSION OF FACE, INITIAL ENCOUNTER: ICD-10-CM

## 2022-12-22 DIAGNOSIS — R05.9 COUGH: ICD-10-CM

## 2022-12-22 PROCEDURE — 99284 EMERGENCY DEPT VISIT MOD MDM: CPT | Mod: 25
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
## 2023-12-12 ENCOUNTER — HOSPITAL ENCOUNTER (EMERGENCY)
Facility: HOSPITAL | Age: 53
Discharge: HOME | End: 2023-12-12
Attending: EMERGENCY MEDICINE
Payer: MEDICARE

## 2023-12-12 VITALS
BODY MASS INDEX: 26.48 KG/M2 | TEMPERATURE: 97 F | WEIGHT: 185 LBS | RESPIRATION RATE: 16 BRPM | HEIGHT: 70 IN | OXYGEN SATURATION: 98 % | HEART RATE: 82 BPM | DIASTOLIC BLOOD PRESSURE: 94 MMHG | SYSTOLIC BLOOD PRESSURE: 143 MMHG

## 2023-12-12 DIAGNOSIS — J06.9 VIRAL URI WITH COUGH: Primary | ICD-10-CM

## 2023-12-12 DIAGNOSIS — R07.9 CHEST PAIN: ICD-10-CM

## 2023-12-12 LAB
ALBUMIN SERPL-MCNC: 4.5 G/DL (ref 3.5–5)
ALBUMIN/GLOB SERPL: 1.5 RATIO (ref 1.1–2)
ALP SERPL-CCNC: 90 UNIT/L (ref 40–150)
ALT SERPL-CCNC: 13 UNIT/L (ref 0–55)
AST SERPL-CCNC: 20 UNIT/L (ref 5–34)
BASOPHILS # BLD AUTO: 0.03 X10(3)/MCL
BASOPHILS NFR BLD AUTO: 0.2 %
BILIRUB SERPL-MCNC: 0.4 MG/DL
BNP BLD-MCNC: <10 PG/ML
BUN SERPL-MCNC: 14.4 MG/DL (ref 8.4–25.7)
CALCIUM SERPL-MCNC: 10.4 MG/DL (ref 8.4–10.2)
CHLORIDE SERPL-SCNC: 102 MMOL/L (ref 98–107)
CO2 SERPL-SCNC: 26 MMOL/L (ref 22–29)
CREAT SERPL-MCNC: 0.93 MG/DL (ref 0.73–1.18)
EOSINOPHIL # BLD AUTO: 0.11 X10(3)/MCL (ref 0–0.9)
EOSINOPHIL NFR BLD AUTO: 0.8 %
ERYTHROCYTE [DISTWIDTH] IN BLOOD BY AUTOMATED COUNT: 13.6 % (ref 11.5–17)
FLUAV AG UPPER RESP QL IA.RAPID: NOT DETECTED
FLUBV AG UPPER RESP QL IA.RAPID: NOT DETECTED
GFR SERPLBLD CREATININE-BSD FMLA CKD-EPI: >60 MLS/MIN/1.73/M2
GLOBULIN SER-MCNC: 3.1 GM/DL (ref 2.4–3.5)
GLUCOSE SERPL-MCNC: 98 MG/DL (ref 74–100)
HCT VFR BLD AUTO: 48.3 % (ref 42–52)
HGB BLD-MCNC: 16.2 G/DL (ref 14–18)
IMM GRANULOCYTES # BLD AUTO: 0.05 X10(3)/MCL (ref 0–0.04)
IMM GRANULOCYTES NFR BLD AUTO: 0.4 %
LYMPHOCYTES # BLD AUTO: 1.56 X10(3)/MCL (ref 0.6–4.6)
LYMPHOCYTES NFR BLD AUTO: 11.7 %
MCH RBC QN AUTO: 30.7 PG (ref 27–31)
MCHC RBC AUTO-ENTMCNC: 33.5 G/DL (ref 33–36)
MCV RBC AUTO: 91.7 FL (ref 80–94)
MONOCYTES # BLD AUTO: 1.02 X10(3)/MCL (ref 0.1–1.3)
MONOCYTES NFR BLD AUTO: 7.7 %
NEUTROPHILS # BLD AUTO: 10.52 X10(3)/MCL (ref 2.1–9.2)
NEUTROPHILS NFR BLD AUTO: 79.2 %
NRBC BLD AUTO-RTO: 0 %
PLATELET # BLD AUTO: 184 X10(3)/MCL (ref 130–400)
PMV BLD AUTO: 12.5 FL (ref 7.4–10.4)
POTASSIUM SERPL-SCNC: 4.6 MMOL/L (ref 3.5–5.1)
PROT SERPL-MCNC: 7.6 GM/DL (ref 6.4–8.3)
RBC # BLD AUTO: 5.27 X10(6)/MCL (ref 4.7–6.1)
SARS-COV-2 RNA RESP QL NAA+PROBE: NOT DETECTED
SODIUM SERPL-SCNC: 135 MMOL/L (ref 136–145)
TROPONIN I SERPL-MCNC: <0.01 NG/ML (ref 0–0.04)
WBC # SPEC AUTO: 13.29 X10(3)/MCL (ref 4.5–11.5)

## 2023-12-12 PROCEDURE — 25000242 PHARM REV CODE 250 ALT 637 W/ HCPCS: Performed by: NURSE PRACTITIONER

## 2023-12-12 PROCEDURE — 99900035 HC TECH TIME PER 15 MIN (STAT)

## 2023-12-12 PROCEDURE — 94640 AIRWAY INHALATION TREATMENT: CPT

## 2023-12-12 PROCEDURE — 80053 COMPREHEN METABOLIC PANEL: CPT | Performed by: PHYSICIAN ASSISTANT

## 2023-12-12 PROCEDURE — 85025 COMPLETE CBC W/AUTO DIFF WBC: CPT | Performed by: PHYSICIAN ASSISTANT

## 2023-12-12 PROCEDURE — 84484 ASSAY OF TROPONIN QUANT: CPT | Performed by: PHYSICIAN ASSISTANT

## 2023-12-12 PROCEDURE — 96372 THER/PROPH/DIAG INJ SC/IM: CPT | Performed by: NURSE PRACTITIONER

## 2023-12-12 PROCEDURE — 83880 ASSAY OF NATRIURETIC PEPTIDE: CPT | Performed by: PHYSICIAN ASSISTANT

## 2023-12-12 PROCEDURE — 63600175 PHARM REV CODE 636 W HCPCS: Performed by: NURSE PRACTITIONER

## 2023-12-12 PROCEDURE — 94761 N-INVAS EAR/PLS OXIMETRY MLT: CPT

## 2023-12-12 PROCEDURE — 93005 ELECTROCARDIOGRAM TRACING: CPT

## 2023-12-12 PROCEDURE — 0240U COVID/FLU A&B PCR: CPT | Performed by: PHYSICIAN ASSISTANT

## 2023-12-12 PROCEDURE — 99285 EMERGENCY DEPT VISIT HI MDM: CPT | Mod: 25

## 2023-12-12 RX ORDER — KETOROLAC TROMETHAMINE 30 MG/ML
30 INJECTION, SOLUTION INTRAMUSCULAR; INTRAVENOUS
Status: COMPLETED | OUTPATIENT
Start: 2023-12-12 | End: 2023-12-12

## 2023-12-12 RX ORDER — ALBUTEROL SULFATE 90 UG/1
1-2 AEROSOL, METERED RESPIRATORY (INHALATION) EVERY 6 HOURS PRN
Qty: 18 G | Refills: 0 | Status: SHIPPED | OUTPATIENT
Start: 2023-12-12 | End: 2024-12-11

## 2023-12-12 RX ORDER — IPRATROPIUM BROMIDE AND ALBUTEROL SULFATE 2.5; .5 MG/3ML; MG/3ML
3 SOLUTION RESPIRATORY (INHALATION)
Status: COMPLETED | OUTPATIENT
Start: 2023-12-12 | End: 2023-12-12

## 2023-12-12 RX ORDER — PROMETHAZINE HYDROCHLORIDE AND DEXTROMETHORPHAN HYDROBROMIDE 6.25; 15 MG/5ML; MG/5ML
5 SYRUP ORAL EVERY 6 HOURS PRN
Qty: 160 ML | Refills: 0 | Status: SHIPPED | OUTPATIENT
Start: 2023-12-12 | End: 2023-12-12 | Stop reason: SDUPTHER

## 2023-12-12 RX ORDER — CETIRIZINE HYDROCHLORIDE 10 MG/1
10 TABLET ORAL DAILY
Qty: 30 TABLET | Refills: 0 | Status: SHIPPED | OUTPATIENT
Start: 2023-12-12 | End: 2023-12-12 | Stop reason: SDUPTHER

## 2023-12-12 RX ORDER — ALBUTEROL SULFATE 90 UG/1
1-2 AEROSOL, METERED RESPIRATORY (INHALATION) EVERY 6 HOURS PRN
Qty: 18 G | Refills: 0 | Status: SHIPPED | OUTPATIENT
Start: 2023-12-12 | End: 2023-12-12 | Stop reason: SDUPTHER

## 2023-12-12 RX ORDER — CETIRIZINE HYDROCHLORIDE 10 MG/1
10 TABLET ORAL DAILY
Qty: 30 TABLET | Refills: 0 | Status: SHIPPED | OUTPATIENT
Start: 2023-12-12 | End: 2024-01-11

## 2023-12-12 RX ORDER — PROMETHAZINE HYDROCHLORIDE AND DEXTROMETHORPHAN HYDROBROMIDE 6.25; 15 MG/5ML; MG/5ML
5 SYRUP ORAL EVERY 6 HOURS PRN
Qty: 160 ML | Refills: 0 | Status: SHIPPED | OUTPATIENT
Start: 2023-12-12

## 2023-12-12 RX ADMIN — KETOROLAC TROMETHAMINE 30 MG: 30 INJECTION, SOLUTION INTRAMUSCULAR; INTRAVENOUS at 04:12

## 2023-12-12 RX ADMIN — IPRATROPIUM BROMIDE AND ALBUTEROL SULFATE 3 ML: 2.5; .5 SOLUTION RESPIRATORY (INHALATION) at 04:12

## 2023-12-12 NOTE — FIRST PROVIDER EVALUATION
Medical screening examination initiated.  I have conducted a focused provider triage encounter, findings are as follows:    Brief history of present illness:  53-year-old male presents to ED for evaluation of generalized body aches, cough and congestion.  Patient reports having severe chest pain.    There were no vitals filed for this visit.    Pertinent physical exam:  Patient is awake and alert and oriented.  Ambulatory to triage.  In no acute distress.      Brief workup plan:  Labs, EKG, CXR, COVID/FLU    Preliminary workup initiated; this workup will be continued and followed by the physician or advanced practice provider that is assigned to the patient when roomed.

## 2023-12-12 NOTE — ED PROVIDER NOTES
Encounter Date: 12/12/2023       History     Chief Complaint   Patient presents with    Cough     C/o cough, congestion, body aches, and chest pain with breathing/coughing since last night. Denies known fever or recent sick contacts.     Chest Pain     See MDM    The history is provided by the patient. No  was used.     Review of patient's allergies indicates:  No Known Allergies  Past Medical History:   Diagnosis Date    Basal cell carcinoma     Nose    Cord compression     Herniated disc, cervical     Thoracic spinal stenosis      Past Surgical History:   Procedure Laterality Date    BACK SURGERY      LAMINECTOMY      T10-T12     Family History   Problem Relation Age of Onset    Arthritis Mother     Cancer Mother     COPD Mother     Cancer Father      Social History     Tobacco Use    Smoking status: Every Day     Current packs/day: 1.00     Types: Cigarettes    Smokeless tobacco: Never   Substance Use Topics    Alcohol use: Yes     Alcohol/week: 3.0 standard drinks of alcohol     Types: 3 Cans of beer per week    Drug use: No     Review of Systems   HENT:  Positive for congestion.    Respiratory:  Positive for cough.    Cardiovascular:  Positive for chest pain.   All other systems reviewed and are negative.      Physical Exam     Initial Vitals [12/12/23 1241]   BP Pulse Resp Temp SpO2   (!) 147/100 95 20 97.3 °F (36.3 °C) 98 %      MAP       --         Physical Exam    Nursing note and vitals reviewed.  Constitutional: He appears well-developed and well-nourished.   HENT:   Right Ear: Tympanic membrane and ear canal normal.   Left Ear: Tympanic membrane and ear canal normal.   Nose: Rhinorrhea present.   Mouth/Throat: No oropharyngeal exudate, posterior oropharyngeal edema, posterior oropharyngeal erythema or tonsillar abscesses.   Eyes: Conjunctivae are normal.   Cardiovascular:  Normal rate, regular rhythm and normal heart sounds.           Pulmonary/Chest: Breath sounds normal. No  respiratory distress.   +cough   Musculoskeletal:         General: Normal range of motion.     Neurological: He is alert and oriented to person, place, and time.   Skin: Skin is warm and dry.   Psychiatric: He has a normal mood and affect.         ED Course   Procedures  Labs Reviewed   COMPREHENSIVE METABOLIC PANEL - Abnormal; Notable for the following components:       Result Value    Sodium Level 135 (*)     Calcium Level Total 10.4 (*)     All other components within normal limits   CBC WITH DIFFERENTIAL - Abnormal; Notable for the following components:    WBC 13.29 (*)     MPV 12.5 (*)     Neut # 10.52 (*)     IG# 0.05 (*)     All other components within normal limits   B-TYPE NATRIURETIC PEPTIDE - Normal   COVID/FLU A&B PCR - Normal    Narrative:     The Xpert Xpress SARS-CoV-2/FLU/RSV plus is a rapid, multiplexed real-time PCR test intended for the simultaneous qualitative detection and differentiation of SARS-CoV-2, Influenza A, Influenza B, and respiratory syncytial virus (RSV) viral RNA in either nasopharyngeal swab or nasal swab specimens.         TROPONIN I - Normal   CBC W/ AUTO DIFFERENTIAL    Narrative:     The following orders were created for panel order CBC auto differential.  Procedure                               Abnormality         Status                     ---------                               -----------         ------                     CBC with Differential[[id number]]        Abnormal            Final result                 Please view results for these tests on the individual orders.     EKG Readings: (Independently Interpreted)   Initial Reading: No STEMI. Rhythm: Normal Sinus Rhythm. Heart Rate: 92. Ectopy: No Ectopy. ST Segments: Normal ST Segments. T Waves: Normal. Clinical Impression: Normal Sinus Rhythm     ECG Results              EKG 12-lead (Final result)  Result time 12/12/23 15:50:25      Final result by Interface, Lab In [facility] (12/12/23 15:50:25)                    Narrative:    Test Reason : R07.9,    Vent. Rate : 092 BPM     Atrial Rate : 092 BPM     P-R Int : 140 ms          QRS Dur : 086 ms      QT Int : 358 ms       P-R-T Axes : 078 067 064 degrees     QTc Int : 442 ms    Normal sinus rhythm  Minimal voltage criteria for LVH, may be normal variant ( Sokolow-Hurt )  Borderline Abnormal ECG  No previous ECGs available  Confirmed by David Banks MD ([id number]) on 12/12/2023 3:49:39 PM    Referred By: SAY   SELF           Confirmed By:David Banks MD                                  Imaging Results              X-Ray Chest PA And Lateral (Final result)  Result time 12/12/23 13:16:18      Final result by Dontrell Larkin MD (12/12/23 13:16:18)                   Impression:      No acute cardiopulmonary process identified.      Electronically signed by: Dontrell Larkin  Date:    12/12/2023  Time:    13:16               Narrative:    EXAMINATION:  XR CHEST PA AND LATERAL    CLINICAL HISTORY:  Chest pain, unspecified    TECHNIQUE:  Two-view    COMPARISON:  December 22, 2022.    FINDINGS:  Cardiopericardial silhouette is within normal limits.  No acute dense focal or segmental consolidation, congestion, pleural effusion or pneumothorax.                                       Medications   ketorolac injection 30 mg (30 mg Intramuscular Given 12/12/23 1615)   albuterol-ipratropium 2.5 mg-0.5 mg/3 mL nebulizer solution 3 mL (3 mLs Nebulization Given 12/12/23 1649)     Medical Decision Making  52 y/o male who presents with cough, congestion and chest pain with breathing and coughing since yesterday. Felt feverish. No sob. No n/v. +smokes 1 ppd.     EKG no acute findings. Trop neg. Xray no pneumonia or fluid. Flu/covid neg. Bnp neg. Mild leukocytosis. Chemistry normal. Symptoms started yesterday. He is coughing in the room and clearly congested. This seems to be more respiratory issue. Will treat as URI as symptoms just started yesterday and there is no pneumonia on xray. Will cover  with antihistamine, inhaler, and cough medicine. Not toxic. Stable vital signs.     Amount and/or Complexity of Data Reviewed  Labs:  Decision-making details documented in ED Course.  Radiology:  Decision-making details documented in ED Course.  ECG/medicine tests: independent interpretation performed. Decision-making details documented in ED Course.    Risk  Prescription drug management.      Additional MDM:   Differential Diagnosis:   Other: The following diagnoses were also considered and will be evaluated: covid, flu and pneumonia.                                   Clinical Impression:  Final diagnoses:  [R07.9] Chest pain  [J06.9] Viral URI with cough (Primary)          ED Disposition Condition    Discharge Stable          ED Prescriptions       Medication Sig Dispense Start Date End Date Auth. Provider    albuterol (PROVENTIL/VENTOLIN HFA) 90 mcg/actuation inhaler  (Status: Discontinued) Inhale 1-2 puffs into the lungs every 6 (six) hours as needed for Wheezing. Rescue 18 g 12/12/2023 12/12/2023 Nirav, Vonnie, FNP    promethazine-dextromethorphan (PROMETHAZINE-DM) 6.25-15 mg/5 mL Syrp  (Status: Discontinued) Take 5 mLs by mouth every 6 (six) hours as needed (cough). 160 mL 12/12/2023 12/12/2023 Nirav, Vonnie, FNP    cetirizine (ZYRTEC) 10 MG tablet  (Status: Discontinued) Take 1 tablet (10 mg total) by mouth once daily. 30 tablet 12/12/2023 12/12/2023 Nirav, Vonnie, FNP    albuterol (PROVENTIL/VENTOLIN HFA) 90 mcg/actuation inhaler Inhale 1-2 puffs into the lungs every 6 (six) hours as needed for Wheezing. Rescue 18 g 12/12/2023 12/11/2024 Nirav, Vonnie, FNP    cetirizine (ZYRTEC) 10 MG tablet Take 1 tablet (10 mg total) by mouth once daily. 30 tablet 12/12/2023 1/11/2024 Nirav, Vonnie, FNP    promethazine-dextromethorphan (PROMETHAZINE-DM) 6.25-15 mg/5 mL Syrp Take 5 mLs by mouth every 6 (six) hours as needed (cough). 160 mL 12/12/2023 -- Vonnie Gastelum, VANNAP          Follow-up Information        Follow up With Specialties Details Why Contact Info    primary care provider  Call in 1 week As needed [phone]             Vonnie Gastelum FNP  12/12/23 [id number]

## 2023-12-12 NOTE — DISCHARGE INSTRUCTIONS
Promethazine dm for cough as needed. Ventolin inhaler for wheezing. Cetirizine for congestion. Stay hydrated. Ibuprofen 800mg for pain if needed may rotate with tylenol.

## 2024-03-05 ENCOUNTER — HOSPITAL ENCOUNTER (EMERGENCY)
Facility: HOSPITAL | Age: 54
Discharge: HOME | End: 2024-03-06
Attending: EMERGENCY MEDICINE
Payer: MEDICARE

## 2024-03-05 DIAGNOSIS — J18.9 PNEUMONIA DUE TO INFECTIOUS ORGANISM, UNSPECIFIED LATERALITY, UNSPECIFIED PART OF LUNG: Primary | ICD-10-CM

## 2024-03-05 LAB
FLUAV AG UPPER RESP QL IA.RAPID: NOT DETECTED
FLUBV AG UPPER RESP QL IA.RAPID: NOT DETECTED
SARS-COV-2 RNA RESP QL NAA+PROBE: NOT DETECTED

## 2024-03-05 PROCEDURE — 0240U COVID/FLU A&B PCR: CPT | Performed by: STUDENT IN AN ORGANIZED HEALTH CARE EDUCATION/TRAINING PROGRAM

## 2024-03-05 PROCEDURE — 99284 EMERGENCY DEPT VISIT MOD MDM: CPT | Mod: 25

## 2024-03-05 NOTE — Clinical Note
"Date: 3/6/2024  Patient: Sebastian Davis  Admitted: 3/5/2024 11:08 PM  Attending Provider: No att. providers found    Sebastian Davis or his authorized caregiver has made the decision for the patient to leave the emergency department against the advice  of the emergency department staff. He or his authorized caregiver has been informed and understands the inherent risks, including death.  He or his authorized caregiver has decided to accept the responsibility for this decision. Sebastian Davis and a ll necessary parties have been advised that he may return for further evaluation or treatment. His condition at time of discharge was AAOx4.  Sebastian Davis had current vital signs as follows:  BP (!) 153/99   Pulse 102   Temp (!) 101.1 °F (38.4 °C ) (Oral)   Resp (!) 22   Ht 5' 10" (1.778 m)   Wt 83.9 kg (185 lb)   "

## 2024-03-06 VITALS
HEIGHT: 70 IN | BODY MASS INDEX: 26.48 KG/M2 | TEMPERATURE: 99 F | WEIGHT: 185 LBS | DIASTOLIC BLOOD PRESSURE: 97 MMHG | RESPIRATION RATE: 18 BRPM | SYSTOLIC BLOOD PRESSURE: 133 MMHG | HEART RATE: 109 BPM | OXYGEN SATURATION: 100 %

## 2024-03-06 LAB
ALBUMIN SERPL-MCNC: 3.9 G/DL (ref 3.5–5)
ALBUMIN/GLOB SERPL: 1.2 RATIO (ref 1.1–2)
ALP SERPL-CCNC: 96 UNIT/L (ref 40–150)
ALT SERPL-CCNC: 19 UNIT/L (ref 0–55)
APPEARANCE UR: ABNORMAL
AST SERPL-CCNC: 23 UNIT/L (ref 5–34)
BACTERIA #/AREA URNS AUTO: ABNORMAL /HPF
BASOPHILS # BLD AUTO: 0.04 X10(3)/MCL
BASOPHILS NFR BLD AUTO: 0.3 %
BILIRUB SERPL-MCNC: 0.4 MG/DL
BILIRUB UR QL STRIP.AUTO: NEGATIVE
BUN SERPL-MCNC: 13.9 MG/DL (ref 8.4–25.7)
CALCIUM SERPL-MCNC: 8.7 MG/DL (ref 8.4–10.2)
CHLORIDE SERPL-SCNC: 102 MMOL/L (ref 98–107)
CO2 SERPL-SCNC: 22 MMOL/L (ref 22–29)
COLOR UR AUTO: ABNORMAL
CREAT SERPL-MCNC: 0.78 MG/DL (ref 0.73–1.18)
EOSINOPHIL # BLD AUTO: 0.01 X10(3)/MCL (ref 0–0.9)
EOSINOPHIL NFR BLD AUTO: 0.1 %
ERYTHROCYTE [DISTWIDTH] IN BLOOD BY AUTOMATED COUNT: 13.9 % (ref 11.5–17)
GFR SERPLBLD CREATININE-BSD FMLA CKD-EPI: >60 MLS/MIN/1.73/M2
GLOBULIN SER-MCNC: 3.2 GM/DL (ref 2.4–3.5)
GLUCOSE SERPL-MCNC: 111 MG/DL (ref 74–100)
GLUCOSE UR QL STRIP.AUTO: NORMAL
HCT VFR BLD AUTO: 45 % (ref 42–52)
HGB BLD-MCNC: 15.2 G/DL (ref 14–18)
IMM GRANULOCYTES # BLD AUTO: 0.07 X10(3)/MCL (ref 0–0.04)
IMM GRANULOCYTES NFR BLD AUTO: 0.4 %
KETONES UR QL STRIP.AUTO: NEGATIVE
LACTATE SERPL-SCNC: 0.9 MMOL/L (ref 0.5–2.2)
LEUKOCYTE ESTERASE UR QL STRIP.AUTO: NEGATIVE
LYMPHOCYTES # BLD AUTO: 0.69 X10(3)/MCL (ref 0.6–4.6)
LYMPHOCYTES NFR BLD AUTO: 4.3 %
MCH RBC QN AUTO: 30.8 PG (ref 27–31)
MCHC RBC AUTO-ENTMCNC: 33.8 G/DL (ref 33–36)
MCV RBC AUTO: 91.3 FL (ref 80–94)
MONOCYTES # BLD AUTO: 1.7 X10(3)/MCL (ref 0.1–1.3)
MONOCYTES NFR BLD AUTO: 10.6 %
MUCOUS THREADS URNS QL MICRO: ABNORMAL /LPF
NEUTROPHILS # BLD AUTO: 13.48 X10(3)/MCL (ref 2.1–9.2)
NEUTROPHILS NFR BLD AUTO: 84.3 %
NITRITE UR QL STRIP.AUTO: NEGATIVE
NRBC BLD AUTO-RTO: 0 %
PH UR STRIP.AUTO: 7 [PH]
PLATELET # BLD AUTO: 153 X10(3)/MCL (ref 130–400)
PMV BLD AUTO: 11.4 FL (ref 7.4–10.4)
POTASSIUM SERPL-SCNC: 4.1 MMOL/L (ref 3.5–5.1)
PROT SERPL-MCNC: 7.1 GM/DL (ref 6.4–8.3)
PROT UR QL STRIP.AUTO: NEGATIVE
RBC # BLD AUTO: 4.93 X10(6)/MCL (ref 4.7–6.1)
RBC #/AREA URNS AUTO: ABNORMAL /HPF
RBC UR QL AUTO: NEGATIVE
SODIUM SERPL-SCNC: 131 MMOL/L (ref 136–145)
SP GR UR STRIP.AUTO: 1.02 (ref 1–1.03)
SQUAMOUS #/AREA URNS LPF: ABNORMAL /HPF
UROBILINOGEN UR STRIP-ACNC: 2
WBC # SPEC AUTO: 15.99 X10(3)/MCL (ref 4.5–11.5)
WBC #/AREA URNS AUTO: ABNORMAL /HPF

## 2024-03-06 PROCEDURE — 81001 URINALYSIS AUTO W/SCOPE: CPT | Performed by: STUDENT IN AN ORGANIZED HEALTH CARE EDUCATION/TRAINING PROGRAM

## 2024-03-06 PROCEDURE — 25000003 PHARM REV CODE 250: Performed by: EMERGENCY MEDICINE

## 2024-03-06 PROCEDURE — 83605 ASSAY OF LACTIC ACID: CPT | Performed by: EMERGENCY MEDICINE

## 2024-03-06 PROCEDURE — 87154 CUL TYP ID BLD PTHGN 6+ TRGT: CPT | Performed by: STUDENT IN AN ORGANIZED HEALTH CARE EDUCATION/TRAINING PROGRAM

## 2024-03-06 PROCEDURE — 80053 COMPREHEN METABOLIC PANEL: CPT | Performed by: STUDENT IN AN ORGANIZED HEALTH CARE EDUCATION/TRAINING PROGRAM

## 2024-03-06 PROCEDURE — 87077 CULTURE AEROBIC IDENTIFY: CPT | Performed by: STUDENT IN AN ORGANIZED HEALTH CARE EDUCATION/TRAINING PROGRAM

## 2024-03-06 PROCEDURE — 85025 COMPLETE CBC W/AUTO DIFF WBC: CPT | Performed by: STUDENT IN AN ORGANIZED HEALTH CARE EDUCATION/TRAINING PROGRAM

## 2024-03-06 PROCEDURE — 87040 BLOOD CULTURE FOR BACTERIA: CPT | Performed by: STUDENT IN AN ORGANIZED HEALTH CARE EDUCATION/TRAINING PROGRAM

## 2024-03-06 RX ORDER — AZITHROMYCIN 250 MG/1
250 TABLET, FILM COATED ORAL DAILY
Qty: 6 TABLET | Refills: 0 | Status: SHIPPED | OUTPATIENT
Start: 2024-03-06

## 2024-03-06 RX ORDER — ACETAMINOPHEN 500 MG
1000 TABLET ORAL
Status: COMPLETED | OUTPATIENT
Start: 2024-03-06 | End: 2024-03-06

## 2024-03-06 RX ORDER — AMOXICILLIN AND CLAVULANATE POTASSIUM 875; 125 MG/1; MG/1
1 TABLET, FILM COATED ORAL 2 TIMES DAILY
Qty: 14 TABLET | Refills: 0 | Status: SHIPPED | OUTPATIENT
Start: 2024-03-06 | End: 2024-03-13

## 2024-03-06 RX ORDER — AZITHROMYCIN 250 MG/1
250 TABLET, FILM COATED ORAL DAILY
Qty: 6 TABLET | Refills: 0 | Status: SHIPPED | OUTPATIENT
Start: 2024-03-06 | End: 2024-03-06

## 2024-03-06 RX ORDER — AMOXICILLIN AND CLAVULANATE POTASSIUM 875; 125 MG/1; MG/1
1 TABLET, FILM COATED ORAL 2 TIMES DAILY
Qty: 14 TABLET | Refills: 0 | Status: SHIPPED | OUTPATIENT
Start: 2024-03-06 | End: 2024-03-06

## 2024-03-06 RX ADMIN — ACETAMINOPHEN 1000 MG: 500 TABLET ORAL at 03:03

## 2024-03-06 NOTE — ED NOTES
"Patient stormed out of ER room and stated "If I dont get something for pain soon then I'm out of here". Informed patient that ER MD is with a critical patient and has been given his chart to review, pt interrupted RN and said "fuck this I'm out". Pt self removed BP cuff and pulse ox, pt stormed to front of ER and continued to scream profanities down the hallway. MD informed. RN attempted to deescalate patient and inform him that one of our doctors would see him as soon as they were able. Pt continued to walk to ER lobby.    "

## 2024-03-06 NOTE — ED PROVIDER NOTES
"Encounter Date: 3/5/2024       History     Chief Complaint   Patient presents with    Back Pain     Pt arrives via [facility], EMS / Pt reports chronic back pain, aggravated by riding a bicycle. Pt reports his back "locks up" on him. Pt is febrile on arrival. Pt is unaware of any fevers previously. Pt denies dysuria. Reports cough for several days.      Patient presents for chronic back pain, asking for pain medication. Has already left the ED prior to my examination, returns again for the above. Has a fever, reports cough. Back pain unchanged from chronic symptoms, no new weakness/numbness to extremities, no incontinence or saddle anesthesia. Ambulatory without issue.     The history is provided by the patient.     Review of patient's allergies indicates:  No Known Allergies  Past Medical History:   Diagnosis Date    Basal cell carcinoma     Nose    Cord compression     Herniated disc, cervical     Thoracic spinal stenosis      Past Surgical History:   Procedure Laterality Date    BACK SURGERY      LAMINECTOMY      T10-T12     Family History   Problem Relation Age of Onset    Arthritis Mother     Cancer Mother     COPD Mother     Cancer Father      Social History     Tobacco Use    Smoking status: Every Day     Current packs/day: 1.00     Types: Cigarettes    Smokeless tobacco: Never   Substance Use Topics    Alcohol use: Yes     Alcohol/week: 3.0 standard drinks of alcohol     Types: 3 Cans of beer per week    Drug use: No     Review of Systems   Constitutional:  Positive for fever. Negative for unexpected weight change.   HENT:  Negative for rhinorrhea, sinus pain and sore throat.    Eyes:  Negative for photophobia and visual disturbance.   Respiratory:  Positive for cough. Negative for shortness of breath.    Cardiovascular:  Negative for chest pain.   Gastrointestinal:  Negative for abdominal pain, diarrhea, nausea and vomiting.   Genitourinary:  Negative for difficulty urinating and dysuria.   Musculoskeletal:  " Positive for back pain (chronic without acute change).   Skin:  Negative for wound.   Neurological:  Negative for light-headedness and headaches.       Physical Exam     Initial Vitals [03/05/24 2259]   BP Pulse Resp Temp SpO2   (!) 145/89 (!) 120 13 (!) 101.1 °F (38.4 °C) 100 %      MAP       --         Physical Exam    Nursing note and vitals reviewed.  Constitutional: He appears well-developed and well-nourished. He is not diaphoretic. No distress.   HENT:   Head: Normocephalic and atraumatic.   Dry lips   Eyes: Conjunctivae and EOM are normal.   Neck: Neck supple.   Normal range of motion.  Cardiovascular:  Normal rate, regular rhythm and intact distal pulses.           Pulmonary/Chest: No respiratory distress. He has wheezes. He has no rhonchi. He has no rales.   Coarse expiratory wheezes diffusely   Abdominal: Abdomen is soft. Bowel sounds are normal. He exhibits no distension. There is no abdominal tenderness.   Musculoskeletal:         General: No edema. Normal range of motion.      Cervical back: Normal range of motion and neck supple.     Neurological: He is alert and oriented to person, place, and time. He has normal strength. No sensory deficit. GCS eye subscore is 4. GCS verbal subscore is 5. GCS motor subscore is 6.   Skin: Skin is warm and dry. Capillary refill takes less than 2 seconds.   Psychiatric: He has a normal mood and affect.         ED Course   Procedures  Labs Reviewed   BLOOD CULTURE [facility] - Abnormal; Notable for the following components:       Result Value    CULTURE, BLOOD ([facility]) Streptococcus dysgalactiae ssp equisimilis (*)     GRAM STAIN Gram Positive Cocci, probable Streptococcus (*)     GRAM STAIN Seen in gram stain of broth only (*)     GRAM STAIN 2 of 2 bottles positive (*)     All other components within normal limits   BLOOD CULTURE [facility] - Abnormal; Notable for the following components:    CULTURE, BLOOD ([facility]) Streptococcus dysgalactiae ssp equisimilis (*)     GRAM STAIN Gram  Positive Cocci, probable Streptococcus (*)     GRAM STAIN Seen in gram stain of broth only (*)     GRAM STAIN 2 of 2 bottles positive (*)     All other components within normal limits   BLOOD CULTURE ID PANEL - Abnormal; Notable for the following components:    Streptococcus spp. Detected (*)     All other components within normal limits    Narrative:     The alike BCID2 Panel is a multiplexed nucleic acid test intended for the use with BioFire® FilmArray® 2.0 or BioFire® FilmArray® [employer] for the simultaneous qualitative detection and identification of multiple bacterial and yeast nucleic acids and select genetic determinants associated with antimicrobial resistance.  The BioFire BCID2 Panel test is performed directly on blood culture samples identified as positive by a continuous monitoring blood culture system.  Results are intended to be interpreted in conjunction with Gram stain results.   URINALYSIS, REFLEX TO URINE CULTURE - Abnormal; Notable for the following components:    Appearance, UA Turbid (*)     Urobilinogen, UA 2.0 (*)     Mucous, UA Trace (*)     All other components within normal limits   COMPREHENSIVE METABOLIC PANEL - Abnormal; Notable for the following components:    Sodium Level 131 (*)     Glucose Level 111 (*)     All other components within normal limits   CBC WITH DIFFERENTIAL - Abnormal; Notable for the following components:    WBC 15.99 (*)     MPV 11.4 (*)     Neut # 13.48 (*)     Mono # 1.70 (*)     IG# 0.07 (*)     All other components within normal limits   COVID/FLU A&B PCR - Normal    Narrative:     The Xpert Xpress SARS-CoV-2/FLU/RSV plus is a rapid, multiplexed real-time PCR test intended for the simultaneous qualitative detection and differentiation of SARS-CoV-2, Influenza A, Influenza B, and respiratory syncytial virus (RSV) viral RNA in either nasopharyngeal swab or nasal swab specimens.         LACTIC ACID, PLASMA - Normal   CBC W/ AUTO DIFFERENTIAL    Narrative:      The following orders were created for panel order CBC auto differential.  Procedure                               Abnormality         Status                     ---------                               -----------         ------                     CBC with Differential[[id number]]       Abnormal            Final result                 Please view results for these tests on the individual orders.          Imaging Results              X-Ray Chest 1 View (Final result)  Result time 03/06/24 05:18:57      Final result by Fran Alvarez MD (03/06/24 05:18:57)                   Impression:      Confluent airspace opacities in the left infrahilar region early infiltrate cannot be completely excluded.    Otherwise no active pulmonary disease      Electronically signed by: Fran Alvarez  Date:    03/06/2024  Time:    05:18               Narrative:    EXAMINATION:  XR CHEST 1 VIEW    CLINICAL HISTORY:  fever;, .    COMPARISON:  December 12, 2023    FINDINGS:  Examination reveals mediastinal silhouette to be within normal limits cardiac silhouette is not enlarged right lung field is clear and free of gross infiltrates atelectasis or effusions ill-defined opacity identified at the right base by the 9th right posterior rib likely represents a nipple shadow.    Slightly more confluent opacities identified in the left infrahilar region and left base early infiltrate cannot be completely excluded.    No other focal consolidative changes                                    X-Rays:   Independently Interpreted Readings:   Chest X-Ray: Left infrahilar opacity     Medications   acetaminophen tablet 1,000 mg (1,000 mg Oral Given 3/6/24 0316)     Medical Decision Making  54-year-old male with chronic back pain presents requesting pain medication.  Upon arrival he was febrile.  He does report dry cough.  I have discussed that we can not refill his chronic pain medication prescriptions.  Workup reveals probable pneumonia.   Patient is oxygenating well on room air, does not wish to stay in the ED. He is tolerating PO.  He does not want to stay in the hospital.  This can be treated as an outpatient.  I will give him antibiotics.  PCP follow-up and return precautions discussed.    Differential diagnosis includes but is not limited to pneumonia, covid, other viral illness, UTI and others.     Problems Addressed:  Pneumonia due to infectious organism, unspecified laterality, unspecified part of lung: acute illness or injury with systemic symptoms    Amount and/or Complexity of Data Reviewed  Labs: ordered. Decision-making details documented in ED Course.  Radiology: ordered and independent interpretation performed. Decision-making details documented in ED Course.    Risk  OTC drugs.  Prescription drug management.               ED Course as of 03/10/24 0340   Wed Mar 06, 2024   0515 Patient continues to leave his room, asking for discharge. Remains hemodynamically stable, oxygenating well on RA. Will treat for CAP. Follow-up and return precautions discussed. Patient ambulated out of the ED without issue. [RB]      ED Course User Index  [RB] Suha Suarez MD                           Clinical Impression:  Final diagnoses:  [J18.9] Pneumonia due to infectious organism, unspecified laterality, unspecified part of lung (Primary)          ED Disposition Condition    Discharge Stable          ED Prescriptions       Medication Sig Dispense Start Date End Date Auth. Provider    amoxicillin-clavulanate 875-125mg (AUGMENTIN) 875-125 mg per tablet  (Status: Discontinued) Take 1 tablet by mouth 2 (two) times daily. for 7 days 14 tablet 3/6/2024 3/6/2024 Suha Suarez MD    azithromycin (Z-TAYLOR) 250 MG tablet  (Status: Discontinued) Take 1 tablet (250 mg total) by mouth once daily. Take first 2 tablets together, then 1 every day until finished. 6 tablet 3/6/2024 3/6/2024 Suha Suarez MD    amoxicillin-clavulanate 875-125mg (AUGMENTIN) 875-125  mg per tablet Take 1 tablet by mouth 2 (two) times daily. for 7 days 14 tablet 3/6/2024 3/13/2024 Peggy Marroquin PA    azithromycin (Z-TAYLOR) 250 MG tablet Take 1 tablet (250 mg total) by mouth once daily. Take first 2 tablets together, then 1 every day until finished. 6 tablet 3/6/2024 -- Peggy Marroquin PA          Follow-up Information       Follow up With Specialties Details Why Contact Info    [facility] - Emergency Dept Emergency Medicine  As needed, If symptoms worsen [address] [facility] [place] [id number] [phone]    Primary Care  Schedule an appointment as soon as possible for a visit in 3 days For reevaluation Contact this number to establish a primary care provider. Call tomorrow to schedule a follow-up appointment this week.    [phone]             Suha Suarez MD  03/10/24 1059

## 2024-03-07 LAB
ACINETOBACTER CALCOACETICUS-BAUMANNII COMPLEX (OHS): NOT DETECTED
BACTEROIDES FRAGILIS (OHS): NOT DETECTED
C AURIS DNA BLD POS QL NAA+NON-PROBE: NOT DETECTED
C GATTII+NEOFOR DNA CSF QL NAA+NON-PROBE: NOT DETECTED
CANDIDA ALBICANS (OHS): NOT DETECTED
CANDIDA GLABRATA (OHS): NOT DETECTED
CANDIDA KRUSEI (OHS): NOT DETECTED
CANDIDA PARAPSILOSIS (OHS): NOT DETECTED
CANDIDA TROPICALIS (OHS): NOT DETECTED
CTX-M (OHS): ABNORMAL
ENTEROBACTER CLOACAE COMPLEX (OHS): NOT DETECTED
ENTEROBACTERALES (OHS): NOT DETECTED
ENTEROCOCCUS FAECALIS (OHS): NOT DETECTED
ENTEROCOCCUS FAECIUM (OHS): NOT DETECTED
ESCHERICHIA COLI (OHS): NOT DETECTED
GP B STREP DNA CSF QL NAA+NON-PROBE: NOT DETECTED
HAEM INFLU DNA CSF QL NAA+NON-PROBE: NOT DETECTED
IMP (OHS): ABNORMAL
KLEBSIELLA AEROGENES (OHS): NOT DETECTED
KLEBSIELLA OXYTOCA (OHS): NOT DETECTED
KLEBSIELLA PNEUMONIAE GROUP (OHS): NOT DETECTED
KPC (OHS): ABNORMAL
L MONOCYTOG DNA CSF QL NAA+NON-PROBE: NOT DETECTED
MCR-1 (OHS): ABNORMAL
MECA/C (OHS): ABNORMAL
MECA/C AND MREJ (MRSA)(OHS): ABNORMAL
N MEN DNA CSF QL NAA+NON-PROBE: NOT DETECTED
NDM (OHS): ABNORMAL
OXA-48-LIKE (OHS): ABNORMAL
PROTEUS SPP. (OHS): NOT DETECTED
PSEUDOMONAS AERUGINOSA (OHS): NOT DETECTED
S ENT+BONG DNA STL QL NAA+NON-PROBE: NOT DETECTED
S PNEUM DNA CSF QL NAA+NON-PROBE: NOT DETECTED
SERRATIA MARCESCENS (OHS): NOT DETECTED
STAPHYLOCOCCUS AUREUS (OHS): NOT DETECTED
STAPHYLOCOCCUS EPIDERMIDIS (OHS): NOT DETECTED
STAPHYLOCOCCUS LUGDUNENSIS (OHS): NOT DETECTED
STAPHYLOCOCCUS SPP. (OHS): NOT DETECTED
STENOTROPHOMONAS MALTOPHILIA (OHS): NOT DETECTED
STREPTOCOCCUS PYOGENES (GROUP A)(OHS): NOT DETECTED
STREPTOCOCCUS SPP. (OHS): DETECTED
VANA/B (OHS): ABNORMAL
VIM (OHS): ABNORMAL

## 2024-03-08 LAB
BACTERIA BLD CULT: ABNORMAL
GRAM STN SPEC: ABNORMAL

## 2024-03-09 ENCOUNTER — HOSPITAL ENCOUNTER (INPATIENT)
Facility: HOSPITAL | Age: 54
LOS: 8 days | Discharge: LEFT BEFORE DISCHARGE | DRG: 871 | End: 2024-03-17
Attending: STUDENT IN AN ORGANIZED HEALTH CARE EDUCATION/TRAINING PROGRAM | Admitting: INTERNAL MEDICINE
Payer: MEDICARE

## 2024-03-09 DIAGNOSIS — R78.81 BACTEREMIA: ICD-10-CM

## 2024-03-09 DIAGNOSIS — A41.9 SEPSIS, DUE TO UNSPECIFIED ORGANISM, UNSPECIFIED WHETHER ACUTE ORGAN DYSFUNCTION PRESENT: ICD-10-CM

## 2024-03-09 DIAGNOSIS — E86.0 DEHYDRATION: ICD-10-CM

## 2024-03-09 DIAGNOSIS — R07.9 CHEST PAIN: ICD-10-CM

## 2024-03-09 DIAGNOSIS — I38 ENDOCARDITIS: ICD-10-CM

## 2024-03-09 DIAGNOSIS — N19 RENAL FAILURE, UNSPECIFIED CHRONICITY: ICD-10-CM

## 2024-03-09 DIAGNOSIS — M62.82 NON-TRAUMATIC RHABDOMYOLYSIS: Primary | ICD-10-CM

## 2024-03-09 DIAGNOSIS — R00.0 TACHYCARDIA: ICD-10-CM

## 2024-03-09 DIAGNOSIS — E87.20 LACTIC ACIDOSIS: ICD-10-CM

## 2024-03-09 DIAGNOSIS — R31.9 URINARY TRACT INFECTION WITH HEMATURIA, SITE UNSPECIFIED: ICD-10-CM

## 2024-03-09 DIAGNOSIS — F15.10 METHAMPHETAMINE USE: ICD-10-CM

## 2024-03-09 DIAGNOSIS — N39.0 URINARY TRACT INFECTION WITH HEMATURIA, SITE UNSPECIFIED: ICD-10-CM

## 2024-03-09 LAB
ABS NEUT (OLG): 16.34 X10(3)/MCL (ref 2.1–9.2)
ABS NEUT (OLG): 5.68 X10(3)/MCL (ref 2.1–9.2)
ABS NEUT (OLG): 6.66 X10(3)/MCL (ref 2.1–9.2)
ACINETOBACTER CALCOACETICUS-BAUMANNII COMPLEX (OHS): NOT DETECTED
ALBUMIN SERPL-MCNC: 1.9 G/DL (ref 3.5–5)
ALBUMIN SERPL-MCNC: 2.2 G/DL (ref 3.5–5)
ALBUMIN SERPL-MCNC: 2.7 G/DL (ref 3.5–5)
ALBUMIN/GLOB SERPL: 0.6 RATIO (ref 1.1–2)
ALBUMIN/GLOB SERPL: 0.8 RATIO (ref 1.1–2)
ALBUMIN/GLOB SERPL: 0.8 RATIO (ref 1.1–2)
ALLENS TEST BLOOD GAS (OHS): YES
ALP SERPL-CCNC: 54 UNIT/L (ref 40–150)
ALP SERPL-CCNC: 66 UNIT/L (ref 40–150)
ALP SERPL-CCNC: 80 UNIT/L (ref 40–150)
ALT SERPL-CCNC: 294 UNIT/L (ref 0–55)
ALT SERPL-CCNC: 584 UNIT/L (ref 0–55)
ALT SERPL-CCNC: 654 UNIT/L (ref 0–55)
AMORPH URATE CRY URNS QL MICRO: ABNORMAL /UL
AMPHET UR QL SCN: POSITIVE
APAP SERPL-MCNC: <17.4 UG/ML (ref 17.4–30)
APPEARANCE UR: ABNORMAL
AST SERPL-CCNC: 1040 UNIT/L (ref 5–34)
AST SERPL-CCNC: 1140 UNIT/L (ref 5–34)
AST SERPL-CCNC: 528 UNIT/L (ref 5–34)
BACTERIA #/AREA URNS AUTO: ABNORMAL /HPF
BACTERIA BLD CULT: ABNORMAL
BACTEROIDES FRAGILIS (OHS): NOT DETECTED
BARBITURATE SCN PRESENT UR: NEGATIVE
BASE EXCESS BLD CALC-SCNC: -7.5 MMOL/L (ref -2–2)
BENZODIAZ UR QL SCN: NEGATIVE
BILIRUB SERPL-MCNC: 1.3 MG/DL
BILIRUB SERPL-MCNC: 1.3 MG/DL
BILIRUB SERPL-MCNC: 1.4 MG/DL
BILIRUB UR QL STRIP.AUTO: 2
BLOOD GAS SAMPLE TYPE (OHS): ABNORMAL
BUN SERPL-MCNC: 67.5 MG/DL (ref 8.4–25.7)
BUN SERPL-MCNC: 69 MG/DL (ref 8.4–25.7)
BUN SERPL-MCNC: 71 MG/DL (ref 8.4–25.7)
BURR CELLS (OLG): ABNORMAL
C AURIS DNA BLD POS QL NAA+NON-PROBE: NOT DETECTED
C GATTII+NEOFOR DNA CSF QL NAA+NON-PROBE: NOT DETECTED
CA-I BLD-SCNC: 0.93 MMOL/L (ref 1.12–1.23)
CALCIUM SERPL-MCNC: 6.9 MG/DL (ref 8.4–10.2)
CALCIUM SERPL-MCNC: 7.2 MG/DL (ref 8.4–10.2)
CALCIUM SERPL-MCNC: 7.9 MG/DL (ref 8.4–10.2)
CANDIDA ALBICANS (OHS): NOT DETECTED
CANDIDA GLABRATA (OHS): NOT DETECTED
CANDIDA KRUSEI (OHS): NOT DETECTED
CANDIDA PARAPSILOSIS (OHS): NOT DETECTED
CANDIDA TROPICALIS (OHS): NOT DETECTED
CANNABINOIDS UR QL SCN: POSITIVE
CHLORIDE SERPL-SCNC: 89 MMOL/L (ref 98–107)
CHLORIDE SERPL-SCNC: 91 MMOL/L (ref 98–107)
CHLORIDE SERPL-SCNC: 93 MMOL/L (ref 98–107)
CK SERPL-CCNC: ABNORMAL U/L (ref 30–200)
CO2 BLDA-SCNC: 15.6 MMOL/L
CO2 SERPL-SCNC: 11 MMOL/L (ref 22–29)
CO2 SERPL-SCNC: 14 MMOL/L (ref 22–29)
CO2 SERPL-SCNC: 17 MMOL/L (ref 22–29)
COCAINE UR QL SCN: NEGATIVE
COHGB MFR BLDA: 2.2 % (ref 0.5–1.5)
COLOR UR AUTO: ABNORMAL
CREAT SERPL-MCNC: 3 MG/DL (ref 0.73–1.18)
CREAT SERPL-MCNC: 3.22 MG/DL (ref 0.73–1.18)
CREAT SERPL-MCNC: 3.82 MG/DL (ref 0.73–1.18)
CREAT UR-MCNC: 61.9 MG/DL (ref 63–166)
CRP SERPL-MCNC: 417.9 MG/L
CTX-M (OHS): ABNORMAL
DRAWN BY BLOOD GAS (OHS): ABNORMAL
ENTEROBACTER CLOACAE COMPLEX (OHS): NOT DETECTED
ENTEROBACTERALES (OHS): NOT DETECTED
ENTEROCOCCUS FAECALIS (OHS): NOT DETECTED
ENTEROCOCCUS FAECIUM (OHS): NOT DETECTED
ERYTHROCYTE [DISTWIDTH] IN BLOOD BY AUTOMATED COUNT: 14.1 % (ref 11.5–17)
ERYTHROCYTE [DISTWIDTH] IN BLOOD BY AUTOMATED COUNT: 14.3 % (ref 11.5–17)
ERYTHROCYTE [DISTWIDTH] IN BLOOD BY AUTOMATED COUNT: 14.4 % (ref 11.5–17)
ERYTHROCYTE [SEDIMENTATION RATE] IN BLOOD: 33 MM/HR (ref 0–15)
ESCHERICHIA COLI (OHS): NOT DETECTED
ETHANOL SERPL-MCNC: <10 MG/DL
FENTANYL UR QL SCN: POSITIVE
FLUAV AG UPPER RESP QL IA.RAPID: NOT DETECTED
FLUBV AG UPPER RESP QL IA.RAPID: NOT DETECTED
GFR SERPLBLD CREATININE-BSD FMLA CKD-EPI: 18 MLS/MIN/1.73/M2
GFR SERPLBLD CREATININE-BSD FMLA CKD-EPI: 22 MLS/MIN/1.73/M2
GFR SERPLBLD CREATININE-BSD FMLA CKD-EPI: 24 MLS/MIN/1.73/M2
GIANT PLATELETS: ABNORMAL
GLOBULIN SER-MCNC: 2.7 GM/DL (ref 2.4–3.5)
GLOBULIN SER-MCNC: 3.1 GM/DL (ref 2.4–3.5)
GLOBULIN SER-MCNC: 3.3 GM/DL (ref 2.4–3.5)
GLUCOSE SERPL-MCNC: 136 MG/DL (ref 74–100)
GLUCOSE SERPL-MCNC: 80 MG/DL (ref 74–100)
GLUCOSE SERPL-MCNC: 99 MG/DL (ref 74–100)
GLUCOSE UR QL STRIP.AUTO: NEGATIVE
GP B STREP DNA CSF QL NAA+NON-PROBE: NOT DETECTED
GRAM STN SPEC: ABNORMAL
GRAN CASTS #/AREA URNS LPF: ABNORMAL /LPF
HAEM INFLU DNA CSF QL NAA+NON-PROBE: NOT DETECTED
HCO3 BLDA-SCNC: 14.9 MMOL/L (ref 22–26)
HCT VFR BLD AUTO: 42.8 % (ref 42–52)
HCT VFR BLD AUTO: 46.4 % (ref 42–52)
HCT VFR BLD AUTO: 46.7 % (ref 42–52)
HGB BLD-MCNC: 15 G/DL (ref 14–18)
HGB BLD-MCNC: 15.9 G/DL (ref 14–18)
HGB BLD-MCNC: 16 G/DL (ref 14–18)
HIV 1+2 AB+HIV1 P24 AG SERPL QL IA: NONREACTIVE
IMP (OHS): ABNORMAL
INR PPP: 1.5
INSTRUMENT WBC (OLG): 18.15 X10(3)/MCL
INSTRUMENT WBC (OLG): 6.68 X10(3)/MCL
INSTRUMENT WBC (OLG): 8.33 X10(3)/MCL
KETONES UR QL STRIP.AUTO: ABNORMAL
KLEBSIELLA AEROGENES (OHS): NOT DETECTED
KLEBSIELLA OXYTOCA (OHS): NOT DETECTED
KLEBSIELLA PNEUMONIAE GROUP (OHS): NOT DETECTED
KPC (OHS): ABNORMAL
L MONOCYTOG DNA CSF QL NAA+NON-PROBE: NOT DETECTED
LACTATE SERPL-SCNC: 5.9 MMOL/L (ref 0.5–2.2)
LACTATE SERPL-SCNC: 7.6 MMOL/L (ref 0.5–2.2)
LACTATE SERPL-SCNC: 9 MMOL/L (ref 0.5–2.2)
LACTATE SERPL-SCNC: 9.4 MMOL/L (ref 0.5–2.2)
LEUKOCYTE ESTERASE UR QL STRIP.AUTO: NEGATIVE
LYMPHOCYTES NFR BLD MANUAL: 0.13 X10(3)/MCL
LYMPHOCYTES NFR BLD MANUAL: 0.36 X10(3)/MCL
LYMPHOCYTES NFR BLD MANUAL: 0.42 X10(3)/MCL
LYMPHOCYTES NFR BLD MANUAL: 2 %
LYMPHOCYTES NFR BLD MANUAL: 2 %
LYMPHOCYTES NFR BLD MANUAL: 5 %
MAGNESIUM SERPL-MCNC: 2 MG/DL (ref 1.6–2.6)
MAGNESIUM SERPL-MCNC: 2.6 MG/DL (ref 1.6–2.6)
MCH RBC QN AUTO: 30.2 PG (ref 27–31)
MCH RBC QN AUTO: 30.2 PG (ref 27–31)
MCH RBC QN AUTO: 30.4 PG (ref 27–31)
MCHC RBC AUTO-ENTMCNC: 34 G/DL (ref 33–36)
MCHC RBC AUTO-ENTMCNC: 34.5 G/DL (ref 33–36)
MCHC RBC AUTO-ENTMCNC: 35 G/DL (ref 33–36)
MCR-1 (OHS): ABNORMAL
MCV RBC AUTO: 86.8 FL (ref 80–94)
MCV RBC AUTO: 87.7 FL (ref 80–94)
MCV RBC AUTO: 88.8 FL (ref 80–94)
MDMA UR QL SCN: NEGATIVE
MECA/C (OHS): ABNORMAL
MECA/C AND MREJ (MRSA)(OHS): ABNORMAL
METAMYELOCYTES NFR BLD MANUAL: 2 %
METAMYELOCYTES NFR BLD MANUAL: 3 %
METAMYELOCYTES NFR BLD MANUAL: 5 %
METHGB MFR BLDA: 1.2 % (ref 0.4–1.5)
MONOCYTES NFR BLD MANUAL: 0.58 X10(3)/MCL (ref 0.1–1.3)
MONOCYTES NFR BLD MANUAL: 0.73 X10(3)/MCL (ref 0.1–1.3)
MONOCYTES NFR BLD MANUAL: 0.8 X10(3)/MCL (ref 0.1–1.3)
MONOCYTES NFR BLD MANUAL: 12 %
MONOCYTES NFR BLD MANUAL: 4 %
MONOCYTES NFR BLD MANUAL: 7 %
MYELOCYTES NFR BLD MANUAL: 2 %
MYELOCYTES NFR BLD MANUAL: 2 %
N MEN DNA CSF QL NAA+NON-PROBE: NOT DETECTED
NDM (OHS): ABNORMAL
NEUTROPHILS NFR BLD MANUAL: 80 %
NEUTROPHILS NFR BLD MANUAL: 85 %
NEUTROPHILS NFR BLD MANUAL: 90 %
NITRITE UR QL STRIP.AUTO: POSITIVE
NRBC BLD AUTO-RTO: 0 %
O2 HB BLOOD GAS (OHS): 93.9 % (ref 94–97)
OPIATES UR QL SCN: NEGATIVE
OXA-48-LIKE (OHS): ABNORMAL
OXYHGB MFR BLDA: 14 G/DL (ref 12–16)
PCO2 BLDA: 23 MMHG (ref 35–45)
PCP UR QL: NEGATIVE
PH BLDA: 7.42 [PH] (ref 7.35–7.45)
PH UR STRIP.AUTO: 5.5 [PH]
PH UR: 5.5 [PH] (ref 3–11)
PLATELET # BLD AUTO: 114 X10(3)/MCL (ref 130–400)
PLATELET # BLD AUTO: 66 X10(3)/MCL (ref 130–400)
PLATELET # BLD AUTO: 94 X10(3)/MCL (ref 130–400)
PLATELET # BLD EST: ABNORMAL 10*3/UL
PLATELETS.RETICULATED NFR BLD AUTO: 19 % (ref 0.9–11.2)
PLATELETS.RETICULATED NFR BLD AUTO: 22.3 % (ref 0.9–11.2)
PLATELETS.RETICULATED NFR BLD AUTO: 23.3 % (ref 0.9–11.2)
PMV BLD AUTO: 12.7 FL (ref 7.4–10.4)
PMV BLD AUTO: 12.8 FL (ref 7.4–10.4)
PMV BLD AUTO: ABNORMAL FL
PO2 BLDA: 80 MMHG (ref 80–100)
POCT GLUCOSE: 144 MG/DL (ref 70–110)
POCT GLUCOSE: 168 MG/DL (ref 70–110)
POCT GLUCOSE: 20 MG/DL (ref 70–110)
POCT GLUCOSE: 40 MG/DL (ref 70–110)
POCT GLUCOSE: 45 MG/DL (ref 70–110)
POCT GLUCOSE: 75 MG/DL (ref 70–110)
POCT GLUCOSE: 99 MG/DL (ref 70–110)
POCT GLUCOSE: <20 MG/DL (ref 70–110)
POIKILOCYTOSIS BLD QL SMEAR: ABNORMAL
POTASSIUM BLOOD GAS (OHS): 3.9 MMOL/L (ref 3.5–5)
POTASSIUM SERPL-SCNC: 3.8 MMOL/L (ref 3.5–5.1)
POTASSIUM SERPL-SCNC: 4.3 MMOL/L (ref 3.5–5.1)
POTASSIUM SERPL-SCNC: 5.2 MMOL/L (ref 3.5–5.1)
PROT SERPL-MCNC: 4.9 GM/DL (ref 6.4–8.3)
PROT SERPL-MCNC: 5 GM/DL (ref 6.4–8.3)
PROT SERPL-MCNC: 6 GM/DL (ref 6.4–8.3)
PROT UR QL STRIP.AUTO: 2
PROT UR STRIP-MCNC: 139.8 MG/DL
PROTEUS SPP. (OHS): NOT DETECTED
PROTHROMBIN TIME: 18 SECONDS (ref 12.5–14.5)
PSEUDOMONAS AERUGINOSA (OHS): NOT DETECTED
RBC # BLD AUTO: 4.93 X10(6)/MCL (ref 4.7–6.1)
RBC # BLD AUTO: 5.26 X10(6)/MCL (ref 4.7–6.1)
RBC # BLD AUTO: 5.29 X10(6)/MCL (ref 4.7–6.1)
RBC #/AREA URNS AUTO: ABNORMAL /HPF
RBC MORPH BLD: ABNORMAL
RBC UR QL AUTO: 3
RSV A 5' UTR RNA NPH QL NAA+PROBE: NOT DETECTED
S ENT+BONG DNA STL QL NAA+NON-PROBE: NOT DETECTED
S PNEUM DNA CSF QL NAA+NON-PROBE: NOT DETECTED
SALICYLATES SERPL-MCNC: <5 MG/DL (ref 15–30)
SAMPLE SITE BLOOD GAS (OHS): ABNORMAL
SAO2 % BLDA: 96 %
SARS-COV-2 RNA RESP QL NAA+PROBE: NOT DETECTED
SERRATIA MARCESCENS (OHS): NOT DETECTED
SODIUM BLOOD GAS (OHS): 119 MMOL/L (ref 137–145)
SODIUM SERPL-SCNC: 125 MMOL/L (ref 136–145)
SODIUM SERPL-SCNC: 128 MMOL/L (ref 136–145)
SODIUM SERPL-SCNC: 129 MMOL/L (ref 136–145)
SODIUM UR-SCNC: 31 MMOL/L
SP GR UR STRIP.AUTO: >=1.03 (ref 1–1.03)
SPECIFIC GRAVITY, URINE AUTO (.000) (OHS): >=1.03 (ref 1–1.03)
SQUAMOUS #/AREA URNS LPF: ABNORMAL /HPF
STAPHYLOCOCCUS AUREUS (OHS): NOT DETECTED
STAPHYLOCOCCUS EPIDERMIDIS (OHS): NOT DETECTED
STAPHYLOCOCCUS LUGDUNENSIS (OHS): NOT DETECTED
STAPHYLOCOCCUS SPP. (OHS): NOT DETECTED
STENOTROPHOMONAS MALTOPHILIA (OHS): NOT DETECTED
STREPTOCOCCUS PYOGENES (GROUP A)(OHS): NOT DETECTED
STREPTOCOCCUS SPP. (OHS): DETECTED
TEAR DROP CELL (OLG): ABNORMAL
TROPONIN I SERPL-MCNC: 0.04 NG/ML (ref 0–0.04)
TSH SERPL-ACNC: 1.02 UIU/ML (ref 0.35–4.94)
URINE PROTEIN/CREATININE RATIO (OLG): 2.3
UROBILINOGEN UR STRIP-ACNC: 4
UUN UR-MCNC: 435 MG/DL
VANA/B (OHS): ABNORMAL
VIM (OHS): ABNORMAL
WBC # SPEC AUTO: 18.15 X10(3)/MCL (ref 4.5–11.5)
WBC # SPEC AUTO: 6.68 X10(3)/MCL (ref 4.5–11.5)
WBC # SPEC AUTO: 8.33 X10(3)/MCL (ref 4.5–11.5)
WBC #/AREA URNS AUTO: ABNORMAL /HPF
WBC VACUOLES (OHS): ABNORMAL

## 2024-03-09 PROCEDURE — 25000003 PHARM REV CODE 250: Mod: JZ,JG | Performed by: INTERNAL MEDICINE

## 2024-03-09 PROCEDURE — 85027 COMPLETE CBC AUTOMATED: CPT | Performed by: INTERNAL MEDICINE

## 2024-03-09 PROCEDURE — 82077 ASSAY SPEC XCP UR&BREATH IA: CPT | Performed by: STUDENT IN AN ORGANIZED HEALTH CARE EDUCATION/TRAINING PROGRAM

## 2024-03-09 PROCEDURE — 96360 HYDRATION IV INFUSION INIT: CPT

## 2024-03-09 PROCEDURE — 85027 COMPLETE CBC AUTOMATED: CPT | Performed by: NURSE PRACTITIONER

## 2024-03-09 PROCEDURE — 99900035 HC TECH TIME PER 15 MIN (STAT)

## 2024-03-09 PROCEDURE — 80143 DRUG ASSAY ACETAMINOPHEN: CPT | Performed by: STUDENT IN AN ORGANIZED HEALTH CARE EDUCATION/TRAINING PROGRAM

## 2024-03-09 PROCEDURE — 11000001 HC ACUTE MED/SURG PRIVATE ROOM

## 2024-03-09 PROCEDURE — 83605 ASSAY OF LACTIC ACID: CPT | Performed by: STUDENT IN AN ORGANIZED HEALTH CARE EDUCATION/TRAINING PROGRAM

## 2024-03-09 PROCEDURE — 25000003 PHARM REV CODE 250: Performed by: INTERNAL MEDICINE

## 2024-03-09 PROCEDURE — 87086 URINE CULTURE/COLONY COUNT: CPT | Performed by: STUDENT IN AN ORGANIZED HEALTH CARE EDUCATION/TRAINING PROGRAM

## 2024-03-09 PROCEDURE — 82550 ASSAY OF CK (CPK): CPT | Performed by: NURSE PRACTITIONER

## 2024-03-09 PROCEDURE — 84484 ASSAY OF TROPONIN QUANT: CPT | Performed by: NURSE PRACTITIONER

## 2024-03-09 PROCEDURE — 80053 COMPREHEN METABOLIC PANEL: CPT | Performed by: INTERNAL MEDICINE

## 2024-03-09 PROCEDURE — 80053 COMPREHEN METABOLIC PANEL: CPT | Performed by: NURSE PRACTITIONER

## 2024-03-09 PROCEDURE — 63600175 PHARM REV CODE 636 W HCPCS: Performed by: STUDENT IN AN ORGANIZED HEALTH CARE EDUCATION/TRAINING PROGRAM

## 2024-03-09 PROCEDURE — 80053 COMPREHEN METABOLIC PANEL: CPT | Performed by: STUDENT IN AN ORGANIZED HEALTH CARE EDUCATION/TRAINING PROGRAM

## 2024-03-09 PROCEDURE — 85652 RBC SED RATE AUTOMATED: CPT | Performed by: INTERNAL MEDICINE

## 2024-03-09 PROCEDURE — 82550 ASSAY OF CK (CPK): CPT | Performed by: INTERNAL MEDICINE

## 2024-03-09 PROCEDURE — 80307 DRUG TEST PRSMV CHEM ANLYZR: CPT | Performed by: STUDENT IN AN ORGANIZED HEALTH CARE EDUCATION/TRAINING PROGRAM

## 2024-03-09 PROCEDURE — 93005 ELECTROCARDIOGRAM TRACING: CPT

## 2024-03-09 PROCEDURE — 82550 ASSAY OF CK (CPK): CPT | Performed by: STUDENT IN AN ORGANIZED HEALTH CARE EDUCATION/TRAINING PROGRAM

## 2024-03-09 PROCEDURE — 25000003 PHARM REV CODE 250: Performed by: STUDENT IN AN ORGANIZED HEALTH CARE EDUCATION/TRAINING PROGRAM

## 2024-03-09 PROCEDURE — 85007 BL SMEAR W/DIFF WBC COUNT: CPT | Performed by: INTERNAL MEDICINE

## 2024-03-09 PROCEDURE — 80074 ACUTE HEPATITIS PANEL: CPT | Performed by: INTERNAL MEDICINE

## 2024-03-09 PROCEDURE — 36600 WITHDRAWAL OF ARTERIAL BLOOD: CPT

## 2024-03-09 PROCEDURE — 82570 ASSAY OF URINE CREATININE: CPT | Performed by: INTERNAL MEDICINE

## 2024-03-09 PROCEDURE — 85610 PROTHROMBIN TIME: CPT | Performed by: NURSE PRACTITIONER

## 2024-03-09 PROCEDURE — 85027 COMPLETE CBC AUTOMATED: CPT | Performed by: STUDENT IN AN ORGANIZED HEALTH CARE EDUCATION/TRAINING PROGRAM

## 2024-03-09 PROCEDURE — 86140 C-REACTIVE PROTEIN: CPT | Performed by: INTERNAL MEDICINE

## 2024-03-09 PROCEDURE — 0241U COVID/RSV/FLU A&B PCR: CPT | Performed by: STUDENT IN AN ORGANIZED HEALTH CARE EDUCATION/TRAINING PROGRAM

## 2024-03-09 PROCEDURE — 99291 CRITICAL CARE FIRST HOUR: CPT

## 2024-03-09 PROCEDURE — 93010 ELECTROCARDIOGRAM REPORT: CPT | Mod: 76,,, | Performed by: INTERNAL MEDICINE

## 2024-03-09 PROCEDURE — 21400001 HC TELEMETRY ROOM

## 2024-03-09 PROCEDURE — 87154 CUL TYP ID BLD PTHGN 6+ TRGT: CPT | Performed by: STUDENT IN AN ORGANIZED HEALTH CARE EDUCATION/TRAINING PROGRAM

## 2024-03-09 PROCEDURE — 83605 ASSAY OF LACTIC ACID: CPT | Performed by: NURSE PRACTITIONER

## 2024-03-09 PROCEDURE — 87389 HIV-1 AG W/HIV-1&-2 AB AG IA: CPT | Performed by: INTERNAL MEDICINE

## 2024-03-09 PROCEDURE — 83735 ASSAY OF MAGNESIUM: CPT | Performed by: NURSE PRACTITIONER

## 2024-03-09 PROCEDURE — 93010 ELECTROCARDIOGRAM REPORT: CPT | Mod: ,,, | Performed by: INTERNAL MEDICINE

## 2024-03-09 PROCEDURE — 87040 BLOOD CULTURE FOR BACTERIA: CPT | Performed by: STUDENT IN AN ORGANIZED HEALTH CARE EDUCATION/TRAINING PROGRAM

## 2024-03-09 PROCEDURE — 81015 MICROSCOPIC EXAM OF URINE: CPT | Performed by: STUDENT IN AN ORGANIZED HEALTH CARE EDUCATION/TRAINING PROGRAM

## 2024-03-09 PROCEDURE — 84443 ASSAY THYROID STIM HORMONE: CPT | Performed by: STUDENT IN AN ORGANIZED HEALTH CARE EDUCATION/TRAINING PROGRAM

## 2024-03-09 PROCEDURE — 63600175 PHARM REV CODE 636 W HCPCS: Performed by: INTERNAL MEDICINE

## 2024-03-09 PROCEDURE — 82803 BLOOD GASES ANY COMBINATION: CPT

## 2024-03-09 PROCEDURE — 27000221 HC OXYGEN, UP TO 24 HOURS

## 2024-03-09 PROCEDURE — 94760 N-INVAS EAR/PLS OXIMETRY 1: CPT | Mod: XB

## 2024-03-09 PROCEDURE — 80179 DRUG ASSAY SALICYLATE: CPT | Performed by: STUDENT IN AN ORGANIZED HEALTH CARE EDUCATION/TRAINING PROGRAM

## 2024-03-09 PROCEDURE — 84520 ASSAY OF UREA NITROGEN: CPT | Performed by: INTERNAL MEDICINE

## 2024-03-09 PROCEDURE — 96361 HYDRATE IV INFUSION ADD-ON: CPT

## 2024-03-09 PROCEDURE — 83735 ASSAY OF MAGNESIUM: CPT | Performed by: STUDENT IN AN ORGANIZED HEALTH CARE EDUCATION/TRAINING PROGRAM

## 2024-03-09 PROCEDURE — 83605 ASSAY OF LACTIC ACID: CPT | Performed by: INTERNAL MEDICINE

## 2024-03-09 PROCEDURE — 85007 BL SMEAR W/DIFF WBC COUNT: CPT | Performed by: NURSE PRACTITIONER

## 2024-03-09 PROCEDURE — 82962 GLUCOSE BLOOD TEST: CPT

## 2024-03-09 PROCEDURE — 87077 CULTURE AEROBIC IDENTIFY: CPT | Performed by: STUDENT IN AN ORGANIZED HEALTH CARE EDUCATION/TRAINING PROGRAM

## 2024-03-09 PROCEDURE — 84300 ASSAY OF URINE SODIUM: CPT | Performed by: INTERNAL MEDICINE

## 2024-03-09 RX ORDER — PROCHLORPERAZINE EDISYLATE 5 MG/ML
5 INJECTION INTRAMUSCULAR; INTRAVENOUS EVERY 6 HOURS PRN
Status: DISCONTINUED | OUTPATIENT
Start: 2024-03-09 | End: 2024-03-17 | Stop reason: HOSPADM

## 2024-03-09 RX ORDER — LEVALBUTEROL INHALATION SOLUTION 1.25 MG/3ML
1.25 SOLUTION RESPIRATORY (INHALATION) EVERY 6 HOURS PRN
Status: DISCONTINUED | OUTPATIENT
Start: 2024-03-09 | End: 2024-03-17 | Stop reason: HOSPADM

## 2024-03-09 RX ORDER — SODIUM CHLORIDE 9 MG/ML
1000 INJECTION, SOLUTION INTRAVENOUS
Status: COMPLETED | OUTPATIENT
Start: 2024-03-09 | End: 2024-03-09

## 2024-03-09 RX ORDER — INDOMETHACIN 25 MG/1
50 CAPSULE ORAL ONCE
Status: COMPLETED | OUTPATIENT
Start: 2024-03-09 | End: 2024-03-09

## 2024-03-09 RX ORDER — ACETAMINOPHEN 325 MG/1
650 TABLET ORAL EVERY 4 HOURS PRN
Status: DISCONTINUED | OUTPATIENT
Start: 2024-03-09 | End: 2024-03-17 | Stop reason: HOSPADM

## 2024-03-09 RX ORDER — TALC
6 POWDER (GRAM) TOPICAL NIGHTLY PRN
Status: DISCONTINUED | OUTPATIENT
Start: 2024-03-09 | End: 2024-03-17 | Stop reason: HOSPADM

## 2024-03-09 RX ORDER — POLYETHYLENE GLYCOL 3350 17 G/17G
17 POWDER, FOR SOLUTION ORAL 2 TIMES DAILY PRN
Status: DISCONTINUED | OUTPATIENT
Start: 2024-03-09 | End: 2024-03-17 | Stop reason: HOSPADM

## 2024-03-09 RX ORDER — CALCIUM GLUCONATE 20 MG/ML
1 INJECTION, SOLUTION INTRAVENOUS ONCE
Status: COMPLETED | OUTPATIENT
Start: 2024-03-09 | End: 2024-03-09

## 2024-03-09 RX ORDER — GLUCAGON 1 MG
1 KIT INJECTION
Status: DISCONTINUED | OUTPATIENT
Start: 2024-03-09 | End: 2024-03-17 | Stop reason: HOSPADM

## 2024-03-09 RX ORDER — ENOXAPARIN SODIUM 100 MG/ML
30 INJECTION SUBCUTANEOUS EVERY 24 HOURS
Status: DISCONTINUED | OUTPATIENT
Start: 2024-03-09 | End: 2024-03-10

## 2024-03-09 RX ORDER — ONDANSETRON HYDROCHLORIDE 2 MG/ML
4 INJECTION, SOLUTION INTRAVENOUS EVERY 4 HOURS PRN
Status: DISCONTINUED | OUTPATIENT
Start: 2024-03-09 | End: 2024-03-17 | Stop reason: HOSPADM

## 2024-03-09 RX ORDER — ALUMINUM HYDROXIDE, MAGNESIUM HYDROXIDE, AND SIMETHICONE 1200; 120; 1200 MG/30ML; MG/30ML; MG/30ML
30 SUSPENSION ORAL 4 TIMES DAILY PRN
Status: DISCONTINUED | OUTPATIENT
Start: 2024-03-09 | End: 2024-03-17 | Stop reason: HOSPADM

## 2024-03-09 RX ORDER — AMOXICILLIN 250 MG
2 CAPSULE ORAL 2 TIMES DAILY PRN
Status: DISCONTINUED | OUTPATIENT
Start: 2024-03-09 | End: 2024-03-17 | Stop reason: HOSPADM

## 2024-03-09 RX ORDER — SODIUM CHLORIDE 0.9 % (FLUSH) 0.9 %
10 SYRINGE (ML) INJECTION
Status: DISCONTINUED | OUTPATIENT
Start: 2024-03-09 | End: 2024-03-17 | Stop reason: HOSPADM

## 2024-03-09 RX ADMIN — CALCIUM GLUCONATE 1 G: 20 INJECTION, SOLUTION INTRAVENOUS at 06:03

## 2024-03-09 RX ADMIN — SODIUM CHLORIDE, POTASSIUM CHLORIDE, SODIUM LACTATE AND CALCIUM CHLORIDE 2448 ML: 600; 310; 30; 20 INJECTION, SOLUTION INTRAVENOUS at 02:03

## 2024-03-09 RX ADMIN — ACETAMINOPHEN 650 MG: 325 TABLET, FILM COATED ORAL at 08:03

## 2024-03-09 RX ADMIN — SODIUM BICARBONATE: 84 INJECTION, SOLUTION INTRAVENOUS at 06:03

## 2024-03-09 RX ADMIN — PIPERACILLIN SODIUM AND TAZOBACTAM SODIUM 4.5 G: 4; .5 INJECTION, POWDER, LYOPHILIZED, FOR SOLUTION INTRAVENOUS at 11:03

## 2024-03-09 RX ADMIN — SODIUM BICARBONATE: 84 INJECTION, SOLUTION INTRAVENOUS at 08:03

## 2024-03-09 RX ADMIN — PIPERACILLIN SODIUM AND TAZOBACTAM SODIUM 4.5 G: 4; .5 INJECTION, POWDER, LYOPHILIZED, FOR SOLUTION INTRAVENOUS at 08:03

## 2024-03-09 RX ADMIN — VANCOMYCIN HYDROCHLORIDE 1750 MG: 500 INJECTION, POWDER, LYOPHILIZED, FOR SOLUTION INTRAVENOUS at 04:03

## 2024-03-09 RX ADMIN — ENOXAPARIN SODIUM 30 MG: 30 INJECTION SUBCUTANEOUS at 06:03

## 2024-03-09 RX ADMIN — SODIUM BICARBONATE 50 MEQ: 84 INJECTION, SOLUTION INTRAVENOUS at 06:03

## 2024-03-09 RX ADMIN — SODIUM CHLORIDE 1000 ML: 9 INJECTION, SOLUTION INTRAVENOUS at 02:03

## 2024-03-09 RX ADMIN — PIPERACILLIN SODIUM AND TAZOBACTAM SODIUM 4.5 G: 4; .5 INJECTION, POWDER, LYOPHILIZED, FOR SOLUTION INTRAVENOUS at 02:03

## 2024-03-09 NOTE — H&P
[facility] Hospital Medicine History & Physical Examination       Patient Name: Sebastian Davis  MRN: [id number]  Patient Class: IP- Inpatient   Admission Date: 03/09/2024   Admitting Service: Hospital Medicine   Length of Stay: 0  Attending Physician: Oniel Overton MD  Primary Care Provider: Devika, Primary Doctor  Face-to-Face encounter date: 03/09/2024  Code Status: Full  Chief Complaint: Cerebrovascular Accident (Pt arrives via [facility], EMS reports pt last seen normal at 0000, spouse reported to EMS pt speech slurred, numbness in the left face. On arrival pt has mild slurred speech, no facial asymmetry, no decreased strength, decreased sensation to the RLE. EMS unable to get O2 sat, pt skin is mottled. )      Patient information was obtained from patient, patient's family, past medical records and ER records.      HISTORY OF PRESENT ILLNESS:   Sebastian Davis is a 54 y.o. male with a PMHx of polysubstance abuse and chronic back pain  who presented to [facility] via EMS on 3/9/2024 with c/o generalized weakness and numbness to fingers and toes, slurred speech and numbness to the left side of his face upon awakening at midnight.  Patient last smoked methamphetamine  and marijuana x3 days ago.  He denied any IV drug use, fever, CP. Symptoms apparently resolved prior to arriving in the ED. NIH of 0 and resolution of symptoms therefore stroke workup not obtained initially.  However patient began experiencing these symptoms again while in the ED. He also complains of bilateral lower extremity weakness, numbness.  He also endorsed a fall x1 day ago.    Of note, patient was seen in the ED on 03/05/2024 with complaints of chronic back pain requesting pain medication also noted to have a fever and cough; CXR demonstrated confluent airspace opacities in the left infrahilar region and early infiltrate can not be excluded.  He was discharged home on Augmentin and azithromycin for pneumonia. BLOOD CULTURES X2  FROM 3/6/2024 POSITIVE FOR Streptococcus dysgalactiae ssp equisimilis.    Upon presentation to ED, vital signed included /108, , RR 18, temperature 98.3° F. Labs notable for WBC 18.15, platelets 114, sodium 129, potassium 5.2, chloride 91, CO2 11, BUN 67.5, creatinine 3.82, calcium 7.9, albumin 2.7, , , CPK 10,110.  Lactic acid elevated at 9.4 and repeat 7.6.  Acetaminophen and salicylate level undetectable.  UDS positive for amphetamines, fentanyl and cannabinoids.  Influenza, RSV and COVID-19 negative.  Urinalysis with 2+ protein, trace ketones, 3+ blood, positive nitrites, 2+ bilirubin, 4 urobilinogen, 6-10 RBCs, 21-50 WBCs and moderate bacteria.  Urine creatinine 61.9.  Blood cultures x2 and urine culture pending. CT head without contrast negative for acute intracranial process.  CT chest abdomen and pelvis without contrast demonstrated nondistended bladder however bladder wall appears thickened which could reflect an element of cystitis; emphysematous changes in bilateral lungs more pronounced in bilateral upper lobes, 4 cm bulla seen in the anterior right upper lobe along the retrosternal region and a 4 mm ground-glass nodule in the left lower lobe; stomach is markedly fluid distended; 3rd duodenal segment is not well delineated possibility of partial bowel obstruction secondary to SMA syndrome is not excluded.  He was started on broad-spectrum IV antibiotics in ED, also received sodium bicarbonate 50 mEq, and calcium gluconate 1 g in ED.  Admitted to hospital medicine service for further medical management.    At the time of exam patient is pale, diaphoretic, tachycardic, dyspneic on supplemental oxygen.  Notified by nurse that lactic acid and CPK are up trending.  ICU nurse, ED nurse and nursing supervisor present at the bedside.  Attending MD notified.    REVIEW OF SYSTEMS:   Except as documented, all other systems reviewed and negative     PAST MEDICAL HISTORY:   Polysubstance  abuse  Chronic back pain    PAST SURGICAL HISTORY:     Past Surgical History:   Procedure Laterality Date    BACK SURGERY      LAMINECTOMY      T10-T12       FAMILY HISTORY:   Reviewed and negative    SOCIAL HISTORY:   Drinks 3 cans of beer per week.  Smokes 1 pack of cigarettes per day.  Methamphetamine use.    ALLERGIES:   Patient has no known allergies.    HOME MEDICATIONS:     Prior to Admission medications    Medication Sig Start Date End Date Taking? Authorizing Provider   albuterol (PROVENTIL/VENTOLIN HFA) 90 mcg/actuation inhaler Inhale 1-2 puffs into the lungs every 6 (six) hours as needed for Wheezing. Rescue 12/12/23 12/11/24  Vonnie Gastelum FNP   amoxicillin-clavulanate 875-125mg (AUGMENTIN) 875-125 mg per tablet Take 1 tablet by mouth 2 (two) times daily. for 7 days 3/6/24 3/13/24  Peggy Marroquin PA   azithromycin (Z-TAYLOR) 250 MG tablet Take 1 tablet (250 mg total) by mouth once daily. Take first 2 tablets together, then 1 every day until finished. 3/6/24   Peggy Marroquin PA   cetirizine (ZYRTEC) 10 MG tablet Take 1 tablet (10 mg total) by mouth once daily. 12/12/23 1/11/24  Vonnie Gastelum FNP   gabapentin (NEURONTIN) 300 MG capsule Take 300 mg by mouth 3 (three) times daily.    Provider, Historical   HYDROcodone-acetaminophen (NORCO) 5-325 mg per tablet Take 1 tablet by mouth every 6 (six) hours as needed for Pain. 10/22/22   Justin Salazar MD   naproxen (NAPROSYN) 500 MG tablet Take 1 tablet (500 mg total) by mouth 2 (two) times daily with meals. 9/27/22   Kasi Carreon IV, MD   promethazine-dextromethorphan (PROMETHAZINE-DM) 6.25-15 mg/5 mL Syrp Take 5 mLs by mouth every 6 (six) hours as needed (cough). 12/12/23   Vonnie Gastelum FNP   TIZANIDINE HCL (ZANAFLEX ORAL) Take by mouth 3 (three) times daily as needed.    Provider, Historical     ________________________________________________________________________  INPATIENT LIST OF MEDICATIONS     Current Facility-Administered Medications:      acetaminophen tablet 650 mg, 650 mg, Oral, Q4H PRN, Janet Houston MD    aluminum-magnesium hydroxide-simethicone 200-200-20 mg/5 mL suspension 30 mL, 30 mL, Oral, QID PRN, Janet Houston MD    enoxaparin injection 30 mg, 30 mg, Subcutaneous, Daily, Janet Houston MD    melatonin tablet 6 mg, 6 mg, Oral, Nightly PRN, Janet Houston MD    ondansetron injection 4 mg, 4 mg, Intravenous, Q4H PRN, Janet Houston MD    piperacillin-tazobactam (ZOSYN) 4.5 g in dextrose 5 % in water (D5W) 100 mL IVPB (MB+), 4.5 g, Intravenous, Q8H, Kasi Carreon IV, MD, Last Rate: 25 mL/hr at 03/09/24 0655, Rate Verify at 03/09/24 0655    polyethylene glycol packet 17 g, 17 g, Oral, BID PRN, Janet Houston MD    prochlorperazine injection Soln 5 mg, 5 mg, Intravenous, Q6H PRN, Janet Houston MD    senna-docusate 8.6-50 mg per tablet 2 tablet, 2 tablet, Oral, BID PRN, Janet Houston MD    sodium bicarbonate 100 mEq in sodium chloride 0.45% 1,000 mL infusion, , Intravenous, Continuous, Janet Houston MD, Last Rate: 150 mL/hr at 03/09/24 0616, New Bag at 03/09/24 0616    sodium chloride 0.9% flush 10 mL, 10 mL, Intravenous, PRN, Janet Houston MD    Pharmacy to dose Vancomycin consult, , , Once **AND** vancomycin - pharmacy to dose, , Intravenous, pharmacy to manage frequency, Janet Houston MD    Current Outpatient Medications:     albuterol (PROVENTIL/VENTOLIN HFA) 90 mcg/actuation inhaler, Inhale 1-2 puffs into the lungs every 6 (six) hours as needed for Wheezing. Rescue, Disp: 18 g, Rfl: 0    amoxicillin-clavulanate 875-125mg (AUGMENTIN) 875-125 mg per tablet, Take 1 tablet by mouth 2 (two) times daily. for 7 days, Disp: 14 tablet, Rfl: 0    azithromycin (Z-TAYLOR) 250 MG tablet, Take 1 tablet (250 mg total) by mouth once daily. Take first 2 tablets together, then 1 every day until finished., Disp: 6 tablet, Rfl: 0    cetirizine (ZYRTEC) 10 MG tablet, Take 1 tablet (10 mg total) by mouth once daily., Disp: 30 tablet, Rfl: 0    gabapentin (NEURONTIN)  300 MG capsule, Take 300 mg by mouth 3 (three) times daily., Disp: , Rfl:     HYDROcodone-acetaminophen (NORCO) 5-325 mg per tablet, Take 1 tablet by mouth every 6 (six) hours as needed for Pain., Disp: 10 tablet, Rfl: 0    naproxen (NAPROSYN) 500 MG tablet, Take 1 tablet (500 mg total) by mouth 2 (two) times daily with meals., Disp: 60 tablet, Rfl: 0    promethazine-dextromethorphan (PROMETHAZINE-DM) 6.25-15 mg/5 mL Syrp, Take 5 mLs by mouth every 6 (six) hours as needed (cough)., Disp: 160 mL, Rfl: 0    TIZANIDINE HCL (ZANAFLEX ORAL), Take by mouth 3 (three) times daily as needed., Disp: , Rfl:     Scheduled Meds:   enoxparin  30 mg Subcutaneous Daily    piperacillin-tazobactam (Zosyn) IV (PEDS and ADULTS) (extended infusion is not appropriate)  4.5 g Intravenous Q8H     Continuous Infusions:   sodium bicarbonate 100 mEq in sodium chloride 0.45% 1,000 mL infusion 150 mL/hr at 03/09/24 0616     PRN Meds:.acetaminophen, aluminum-magnesium hydroxide-simethicone, melatonin, ondansetron, polyethylene glycol, prochlorperazine, senna-docusate 8.6-50 mg, sodium chloride 0.9%, Pharmacy to dose Vancomycin consult **AND** vancomycin - pharmacy to dose    PHYSICAL EXAM:     VITAL SIGNS: 24 HRS MIN & MAX LAST   Temp  Min: 98.3 °F (36.8 °C)  Max: 98.3 °F (36.8 °C) 98.3 °F (36.8 °C)   BP  Min: 143/108  Max: 172/128 (!) 149/120   Pulse  Min: 111  Max: 120  (!) 120   Resp  Min: 18  Max: 32 (!) 28   SpO2  Min: 93 %  Max: 98 % (!) 93 %       General appearance:  Chronically ill-appearing male, mild apparent distress.  HENT: Atraumatic head. Moist mucous membranes of oral cavity.  Eyes: Normal extraocular movements.   Neck: Supple.   Lungs: Diminished to auscultation bilaterally. Respiratory distress, tachypnea on O2  Heart: Regular rate and rhythm. Tachycardia. S1 and S2 present. No pedal edema.  Abdomen: Soft, non-distended, non-tender.  Extremities: No cyanosis, clubbing  Skin: Pale and diaphoretic   Neuro: BLE paresthesias and  weakness  Psych/mental status: Appropriate mood and affect. Responds appropriately to questions.     LABS AND IMAGING:     Recent Labs   Lab 03/06/24 0118 03/09/24 0129   WBC 15.99* 18.15  18.15*   RBC 4.93 5.26   HGB 15.2 15.9   HCT 45.0 46.7   MCV 91.3 88.8   MCH 30.8 30.2   MCHC 33.8 34.0   RDW 13.9 14.3    114*   MPV 11.4* 12.8*       Recent Labs   Lab 03/06/24  0118 03/09/24  0129 03/09/24  0410 03/09/24  0844   * 129*  --  128*   K 4.1 5.2*  --  4.3   CO2 22 11*  --  14*   BUN 13.9 67.5*  --  69.0*   CREATININE 0.78 3.82*  --  3.22*   CALCIUM 8.7 7.9*  --  7.2*   PH  --   --  7.420  --    MG  --  2.60  --   --    ALBUMIN 3.9 2.7*  --  2.2*   ALKPHOS 96 80  --  66   ALT 19 294*  --  584*   AST 23 528*  --  1,040*   BILITOT 0.4 1.4  --  1.3       Microbiology Results (last 7 days)       Procedure Component Value Units Date/Time    Blood culture #1 **CANNOT BE ORDERED STAT** [[id number]] Collected: 03/09/24 0129    Order Status: Resulted Specimen: Blood Updated: 03/09/24 0325    Blood culture #2 **CANNOT BE ORDERED STAT** [[id number]] Collected: 03/09/24 0129    Order Status: Resulted Specimen: Blood Updated: 03/09/24 0325    Urine culture [[id number]] Collected: 03/09/24 0217    Order Status: Sent Specimen: Urine Updated: 03/09/24 0235             CT Chest Abdomen Pelvis Without Contrast (XPD)  START OF REPORT:  Technique: CT Scan of the chest abdomen and pelvis was performed without intravenous contrast with axial as well as sagittal and, coronal images.    Dosage Information: Automated Exposure Control was utilized.    Comparison: Comparison is with abdomen study dated 2022-10-22 09:44:39. Comparison is with chest study dated 2021-09-05 11:05:57.    Clinical History: Sepsis.    Findings:  Soft Tissues: Unremarkable.  Lines and Tubes: None.  Neck: The visualized soft tissues of the neck appear unremarkable.  Mediastinum: The mediastinal structures are within normal limits.  Heart: The heart size  is within normal limits. Mild coronary artery calcification is seen.  Aorta: Unremarkable appearing aorta.  Lungs: There is mild non specific dependent change at the lung bases. There are emphysematous changes in the bilateral lungs, more pronounced in the bilateral upper lobes. A 4 cm bulla is seen in the anterior right upper lobe along the restrosternal region. There is a 4 mm ground glass nodule in the left lower lobe best visualized on series 4 image 66.  Pleura: No effusions or pneumothorax are identified.  Bony Structures: Mild degenerative changes are seen in the right shoulder joint.  Spine: Mild spondylolytic changes are seen in the thoracic spine.  Ribs: The ribs appear unremarkable.  Abdomen:  Abdominal Wall: No abdominal wall pathology is seen.  Liver: The liver appears unremarkable.  Biliary System: No intrahepatic or extrahepatic biliary duct dilatation is seen.  Gallbladder: The gallbladder appears unremarkable.  Pancreas: The pancreas appears unremarkable.  Adrenals: The adrenal glands appear unremarkable.  Kidneys: The kidneys appear unremarkable with no stones cysts masses or hydronephrosis.  Aorta: There is moderate calcification of the.  Bowel:  Esophagus: The visualized esophagus appears unremarkable.  Stomach: The stomach is markedly fluid distended.  Duodenum: The 3rd duodenal segment is not well delineated.  Appendix: The appendix is not identified but no inflammatory changes are seen in the right lower quadrant to suggest appendicitis.  Peritoneum: No intraperitoneal free air or ascites is seen.    Pelvis:  Bladder: The bladder is nondistended however the bladder wall appears thickened after considering state of nondistension which could reflect an element of cystitis.  Male:  Prostate gland: The prostate gland is mildly enlarged. There are numerous calcifications prostate gland.    Bony structures:  Dorsal Spine: There is moderate spondylosis of the visualized dorsal spine. There is grade I  retrolisthesis of L1 over L2. There is severe disc space narrowing at L1-L2 with pronounced sclerosis of the apposing endplates.  Bony Pelvis: The visualized bony structures of the pelvis appear unremarkable.    Impression:  1. The bladder is nondistended however the bladder wall appears thickened after considering state of nondistension which could reflect an element of cystitis.  2. There are emphysematous changes in the bilateral lungs, more pronounced in the bilateral upper lobes. A 4 cm bulla is seen in the anterior right upper lobe along the restrosternal region. There is a 4 mm ground glass nodule in the left lower lobe best visualized on series 4 image 66.  3. The stomach is markedly fluid distended.  4. The 3rd duodenal segment is not well delineated. Possibility of partial bowel obstruction secondary to SMA syndrome is not excluded. Correlate with clinical and laboratory findings as regards additional evaluation and follow-up to full resolution as indicated.  5. Details and other findings as discussed above.  CT Head Without Contrast  START OF REPORT:  Technique: CT of the head was performed without intravenous contrast with axial as well as coronal and sagittal images.    Comparison: Comparison is with study dated2012-08-28 17:28:39.    Dosage Information: Automated exposure control was utilized.    Clinical history: Cerebrovascular Accident (Pt arrives via [facility], EMS reports pt last seen normal at 0000, spouse reported to EMS pt speech slurred, numbness in the left face. On arrival pt has mild slurred speech, no facial asymmetry, no decreased strength, decreased sensation to the RLE.    Findings:  Hemorrhage: No acute intracranial hemorrhage is seen.  CSF spaces: The ventricles, sulci and basal cisterns all appear somewhat prominent suggesting an element of global cerebral atrophy.  Brain parenchyma: There is preservation of the grey white junction throughout. No acute infarct. Subtle scattered  microvascular change is seen in portions of the periventricular and deep white matter tracts.  Cerebellum: Unremarkable.  Vascular: Unremarkable venous sinuses.  Sella and skull base: The sella appears to be within normal limits for age.  Cerebellopontine angles: Within normal limits.  Herniation: None.  Intracranial calcifications: Incidental note is made of bilateral choroid plexus calcification. Incidental note is made of some pineal region calcification.  Calvarium: No acute linear or depressed skull fracture is seen.    Maxillofacial Structures:  Paranasal sinuses: The visualized paranasal sinuses appear clear with no mucoperiosteal thickening or air fluid levels identified.  Orbits: The orbits appear unremarkable.  Zygomatic arches: The zygomatic arches are intact and unremarkable.  Temporal bones and mastoids: The bilateral somewhat sclerotic suggesting chronic mastoiditis.  TMJ: The mandibular condyles appear normally placed with respect to the mandibular fossa.    Impression:  1. No acute intracranial process identified. Details and other findings as noted above.        ASSESSMENT & PLAN:     Severe sepsis secondary to Streptococcus dysgalactiae ssp equisimilis Bacteremia   Acute hypoxemic respiratory failure  Possible Acute UTI, POA  Possible partial bowel obstruction secondary to SMA syndrome  OKSANA   Lactic acidosis   Rhabdomyolysis   Transaminitis  Leukocytosis  Thrombocytopenia  Hyponatremia  Hypochloremia   Mild hyperkalemia   Polysubstance abuse-UDS positive for amphetamine, fentanyl, cannabinoids  Tobacco abuse   Chronic back pain  4 mm ground-glass left upper lobe nodule    Plan:   Patient at high risk for decompensation/ICU upgrade  CT C-spine without contrast pending   MRI brain, MRI T-spine, MRI L-spine without contrast pending  Fall precaution  Broad-spectrum IV antibiotics-vancomycin and Zosyn  Follow urine and blood cultures   Avoid nephrotoxins   Echocardiogram pending  Started on bicarb  infusion in ED  Nicotine patch if patient desires   Resume appropriate home medications once updated  CBC, CMP, lactic acid, troponin, CPK, PT/INR, magnesium at 6:00 p.m.  Labs in a.m.    VTE Prophylaxis: Lovenox    Discharge Planning and Disposition: CLAUDINE MARCUS, Elle Raymond, NP have reviewed and discussed the case with Oniel Hutchinson MD  Please see the attending MD's addendum for further assessment and plan.    Elle Raymond, [facility]-BC  Department of Hospital Medicine   [facility]   03/09/2024    3-9-24 dr hutchinson -dr hutchinson - pt is a 55 y/o male with hx of substance abuse/ chronic back pain seen at [facility] ER on 3-5-24 for back pain/fever and discharged on abxs for suspected pna w early infiltrate . Blood cx's were drawn and discharged on abx's. Returns today with fever/weakness/numbness to extremities/slurred speech and fall. We were consulted for stroke like symptoms but decompensated pretty fast, became hypotensive requiring iv fluids/broad spectrum abx's. Lactic acid at 9 with blood cx's from 3/5/24 positive for streptococcus dysgalactiae. PT is requiring O2/ sodium bicarbonate drip secondary to AGMA probably secondary to increase lactic acid  and  rhabdo w cks around 15,000.  Pt w multiple complains . Complains of abdominal pain with ct abd pertinent for  non distended bladder however bladder wall appears thickened which could reflect an element of cystitis; emphysematous changes in bilateral lungs more pronounced in bilateral upper lobes, 4 cm bulla seen in the anterior right upper lobe along the retrosternal region and a 4 mm ground-glass nodule in the left lower lobe; stomach is markedly fluid distended; 3rd duodenal segment is not well delineated possibility of partial bowel obstruction secondary to SMA syndrome is not excluded. Seen by surgical hospitalist.  PT on vanco and zosyn.    BP 91/30 on sodium bicarbonate drip at 150 cc. Afebrile  Moderate distress/ skin looks  mottle  w palid distal extremities  A/a and o x 3  Normocephalic/ no masses  Neck - supple  Lungs - cta   Abd- generalized tenderness w no rebound  Ext- symmetrical w no edema   Neuro- intact    A-Severe sepsis secondary to Streptococcus dysgalactiae ssp equisimilis   AGMA  Bacteremia   Acute hypoxemic respiratory failure  Possible Acute UTI, POA  Possible partial bowel obstruction secondary to SMA syndrome  OKSANA   Lactic acidosis   Rhabdomyolysis   Transaminitis  Leukocytosis  Thrombocytopenia  Hyponatremia  Hypochloremia   Mild hyperkalemia   Polysubstance abuse-UDS positive for amphetamine, fentanyl, cannabinoids  Tobacco abuse   Chronic back pain  4 mm ground-glass left upper lobe nodule    Plan - pancultured/vanco and zosyn/ continue sodium bicarbonate. May need icu if we cannot keep MAP>70. Continue aggressive fluid resuscitation.  Follow cultures. Continue O2 supplementation. Am labs      Cc 45 minutes   Cc dx - sepsis due to streptococcus dysgalactiae / acute hypoxic resp failure/ lactic acidemia / rhabdo / sodium bicarbonate drip          All diagnosis and differential diagnosis have been reviewed; assessment and plan has been documented; I have personally reviewed the labs and test results that are presently available; I have reviewed the patients medication list; I have reviewed the consulting providers response and recommendations. I have reviewed or attempted to review medical records based upon their availability.    All of the patient and family questions have been addressed and answered. Patient's is agreeable to the above stated plan. I will continue to monitor closely and make adjustments to medical management as needed.      03/09/2024

## 2024-03-09 NOTE — AI DETERIORATION ALERT
Artificial Intelligence Notification  [facility]  [address] [place] [address]  Phone: [phone]    This documentation was triggered by an Artificial Intelligence Notification:    Admit Date: 3/9/2024   LOS: 0  Code Status: Full Code  : 1970  Age: 54 y.o.  Weight:   Wt Readings from Last 1 Encounters:   24 81.6 kg (180 lb)        Sex: male  Bed: 8/[facility] A  MRN: [id number]  Attending Physician: Janet Houston MD     Date of Alert: 2024  Time AI Alert Received: 1632            Vitals:    24 1638   BP: 116/75   Pulse: 108   Resp:    Temp: 98.2 °F (36.8 °C)     SpO2: (!) 92 %      Artificial Intelligence alert discussed with Provider:     Name:    Date/Time of Provider Notification:       Patient Condition: Pt's chart reviewed.  RRT was done on pt this morning @ 10:36am. Pt remained in room, MD at bedside at end of RRT.  Pt has hx of polysubstance abuse, chronic back pain.  He arrived to ED today with c/o generalized weakness and numbness to fingers and toes.  Pt states he lasted smoked methamphetamine and marijuana 3 days ago.  NIH was 0 upon arrival.  Blood cs from previous admit had resulted with streptococcus dysgalactiae ssp equisimilis and was sent home with augmentin and azithromycin.  During his current admit, his UDS was positive for amphetamines, fentanyl, and marijuana.  Lactic was 9.4, repeat was 7.6 after receiving 3L of fluid. Urine culture and blood cultures pending.  Pt was started on broad spectrum antibiotics.  Sodium bicarb gtt has also been started.  BUN was 67.5 and creatine was 3.82, which above his baseline.  Pharmacy is following and adjusting vancomycin doses. Next lactic acid level and CPK is scheduled for 1800 today.  ICU available if needed.

## 2024-03-09 NOTE — ED PROVIDER NOTES
"Encounter Date: 3/9/2024    SCRIBE #1 NOTE: I, Yessica Cochran, am scribing for, and in the presence of,  Kasi Carreon IV, MD. I have scribed the following portions of the note - Other sections scribed: HPI, ROS, PE.       History     Chief Complaint   Patient presents with    Cerebrovascular Accident     Pt arrives via [facility], EMS reports pt last seen normal at 0000, spouse reported to EMS pt speech slurred, numbness in the left face. On arrival pt has mild slurred speech, no facial asymmetry, no decreased strength, decreased sensation to the RLE. EMS unable to get O2 sat, pt skin is mottled.      54 year old male with a history of polysubstance use presents to ED via EMS for evaluation of possible stroke. Per EMS, pt woke up at midnight with slurred speech and numbness to his mouth and left arm/fingers. Patietns states he feel that his "mouth is very dry". Smoked methamphetamine 2 days ago Pt reports generalized weakness and numbness to fingers and toes. Collateral information obtained from wife, she states his smile and speech is back to normal, denies any facial asymmetry. Reportedly, pt was diagnosed with PNA after presenting for chronic back pain on 3/5, but left AMA. Reportedly, pt smoked methamphetamine 2 days ago.    Patient reports that slurred speech and neuro symptoms had all improved on arrival to ER    The history is provided by the patient, the EMS personnel and the spouse.     Review of patient's allergies indicates:  No Known Allergies  Past Medical History:   Diagnosis Date    Basal cell carcinoma     Nose    Cord compression     Herniated disc, cervical     Thoracic spinal stenosis      Past Surgical History:   Procedure Laterality Date    BACK SURGERY      LAMINECTOMY      T10-T12     Family History   Problem Relation Age of Onset    Arthritis Mother     Cancer Mother     COPD Mother     Cancer Father      Social History     Tobacco Use    Smoking status: Every Day     Current packs/day: 1.00     " Types: Cigarettes    Smokeless tobacco: Never   Substance Use Topics    Alcohol use: Yes     Alcohol/week: 3.0 standard drinks of alcohol     Types: 3 Cans of beer per week    Drug use: No     Review of Systems   Constitutional:  Negative for chills and fever.        Generalized weakness   HENT:  Negative for congestion, rhinorrhea and sore throat.    Eyes:  Negative for visual disturbance.   Respiratory:  Negative for cough and shortness of breath.    Cardiovascular:  Negative for chest pain.   Gastrointestinal:  Negative for abdominal pain, nausea and vomiting.   Genitourinary:  Negative for dysuria and hematuria.   Musculoskeletal:  Negative for joint swelling.   Skin:  Negative for rash.   Neurological:  Positive for speech difficulty, weakness and numbness. Negative for facial asymmetry.   All other systems reviewed and are negative.      Physical Exam     Initial Vitals   BP Pulse Resp Temp SpO2   03/09/24 0059 03/09/24 0059 03/09/24 0059 03/09/24 0059 03/09/24 0232   (!) 143/108 (!) 113 18 98.3 °F (36.8 °C) 98 %      MAP       --                Physical Exam    Nursing note and vitals reviewed.  Constitutional: He is not diaphoretic. No distress.   HENT:   Head: Normocephalic and atraumatic.   Mouth/Throat: Mucous membranes are dry.   Neck: Neck supple.   Normal range of motion.  Cardiovascular:  Normal rate and regular rhythm.           Pulmonary/Chest: Breath sounds normal. No respiratory distress.   Abdominal: Abdomen is soft. He exhibits no distension. There is no abdominal tenderness.   Musculoskeletal:         General: No edema.      Cervical back: Normal range of motion and neck supple.     Neurological: He is alert and oriented to person, place, and time. He has normal strength. No cranial nerve deficit or sensory deficit.   NIH stroke scale 0   Skin: Skin is warm. Capillary refill takes less than 2 seconds.   Mottled extremities   Psychiatric: He has a normal mood and affect.         ED Course    Critical Care    Date/Time: 3/9/2024 5:11 AM    Performed by: Kasi Carreon IV, MD  Authorized by: Kasi Carreon IV, MD  Total critical care time (exclusive of procedural time) : 60 minutes  Critical care time was exclusive of separately billable procedures and treating other patients.  Critical care was necessary to treat or prevent imminent or life-threatening deterioration of the following conditions: dehydration, metabolic crisis, sepsis and renal failure.  Critical care was time spent personally by me on the following activities: blood draw for specimens, development of treatment plan with patient or surrogate, discussions with consultants, interpretation of cardiac output measurements, evaluation of patient's response to treatment, examination of patient, ordering and performing treatments and interventions, obtaining history from patient or surrogate, ordering and review of radiographic studies, ordering and review of laboratory studies, pulse oximetry, review of old charts and re-evaluation of patient's condition.        Labs Reviewed   COMPREHENSIVE METABOLIC PANEL - Abnormal; Notable for the following components:       Result Value    Sodium Level 129 (*)     Potassium Level 5.2 (*)     Chloride 91 (*)     Carbon Dioxide 11 (*)     Blood Urea Nitrogen 67.5 (*)     Creatinine 3.82 (*)     Calcium Level Total 7.9 (*)     Protein Total 6.0 (*)     Albumin Level 2.7 (*)     Albumin/Globulin Ratio 0.8 (*)     Alanine Aminotransferase 294 (*)     Aspartate Aminotransferase 528 (*)     All other components within normal limits   DRUG SCREEN, URINE (BEAKER) - Abnormal; Notable for the following components:    Amphetamines, Urine Positive (*)     Cannabinoids, Urine Positive (*)     Fentanyl, Urine Positive (*)     All other components within normal limits    Narrative:     Cut off concentrations:    Amphetamines - 1000 ng/ml  Barbiturates - 200 ng/ml  Benzodiazepine - 200 ng/ml  Cannabinoids (THC) - 50  ng/ml  Cocaine - 300 ng/ml  Fentanyl - 1.0 ng/ml  MDMA - 500 ng/ml  Opiates - 300 ng/ml   Phencyclidine (PCP) - 25 ng/ml    Specimen submitted for drug analysis and tested for pH and specific gravity in order to evaluate sample integrity. Suspect tampering if specific gravity is <1.003 and/or pH is not within the range of 4.5 - 8.0  False negatives may result form substances such as bleach added to urine.  False positives may result for the presence of a substance with similar chemical structure to the drug or its metabolite.    This test provides only a PRELIMINARY analytical test result. A more specific alternate chemical method must be used in order to obtain a confirmed analytical result. Gas chromatography/mass spectrometry (GC/MS) is the preferred confirmatory method. Other chemical confirmation methods are available. Clinical consideration and professional judgement should be applied to any drug of abuse test result, particularly when preliminary positive results are used.    Positive results will be confirmed only at the physicians request. Unconfirmed screening results are to be used only for medical purposes (treatment).        URINALYSIS, REFLEX TO URINE CULTURE - Abnormal; Notable for the following components:    Appearance, UA Turbid (*)     Protein, UA +2 (*)     Ketones, UA Trace (*)     Blood, UA +3 (*)     Bilirubin, UA +2 (*)     Urobilinogen, UA 4.0 (*)     Nitrites, UA Positive (*)     WBC, UA 21-50 (*)     Bacteria, UA Moderate (*)     Granular Casts 0-2 (*)     RBC, UA 6-10 (*)     All other components within normal limits   CBC WITH DIFFERENTIAL - Abnormal; Notable for the following components:    WBC 18.15 (*)     Platelet 114 (*)     MPV 12.8 (*)     IPF 19.0 (*)     All other components within normal limits   LACTIC ACID, PLASMA - Abnormal; Notable for the following components:    Lactic Acid Level 9.4 (*)     All other components within normal limits   MANUAL DIFFERENTIAL - Abnormal;  Notable for the following components:    Metamyelocytes % 3 (*)     Myelocytes % 2 (*)     Neutrophils Abs 16.335 (*)     Lymphs Abs 0.363 (*)     Platelets Decreased (*)     RBC Morph Abnormal (*)     Poikilocytosis 2+ (*)     Debra Cells 2+ (*)     Tear Drops 1+ (*)     All other components within normal limits   LACTIC ACID, PLASMA - Abnormal; Notable for the following components:    Lactic Acid Level 7.6 (*)     All other components within normal limits   CK - Abnormal; Notable for the following components:    Creatine Kinase 10,110 (*)     All other components within normal limits   BLOOD GAS - Abnormal; Notable for the following components:    pCO2, Blood gas 23.0 (*)     Sodium, Blood Gas 119 (*)     Calcium Level Ionized 0.93 (*)     Base Excess, Blood gas -7.50 (*)     HCO3, Blood gas 14.9 (*)     O2 Hb, Blood Gas 93.9 (*)     CO Hgb 2.2 (*)     All other components within normal limits   ALCOHOL,MEDICAL (ETHANOL) - Normal   MAGNESIUM - Normal   TSH - Normal   COVID/RSV/FLU A&B PCR - Normal    Narrative:     The Xpert Xpress SARS-CoV-2/FLU/RSV plus is a rapid, multiplexed real-time PCR test intended for the simultaneous qualitative detection and differentiation of SARS-CoV-2, Influenza A, Influenza B, and respiratory syncytial virus (RSV) viral RNA in either nasopharyngeal swab or nasal swab specimens.         BLOOD CULTURE [facility]   BLOOD CULTURE [facility]   CULTURE, URINE   CBC W/ AUTO DIFFERENTIAL    Narrative:     The following orders were created for panel order CBC auto differential.  Procedure                               Abnormality         Status                     ---------                               -----------         ------                     CBC with Differential[[id number]]       Abnormal            Final result               Manual Differential[[id number]]         Abnormal            Final result                 Please view results for these tests on the individual orders.   SALICYLATE LEVEL    ACETAMINOPHEN LEVEL          Imaging Results              X-Ray Hand 3 view Right (In process)                      X-Ray Hand 3 view Left (In process)    Procedure changed from X-Ray Hand 2 View Bilat                    CT Chest Abdomen Pelvis Without Contrast (XPD) (Preliminary result)  Result time 03/09/24 03:24:42   Procedure changed from CT Chest Without Contrast     Preliminary result by Interface, Rad Results In (03/09/24 03:24:42)                   Narrative:    START OF REPORT:  Technique: CT Scan of the chest abdomen and pelvis was performed without intravenous contrast with axial as well as sagittal and, coronal images.    Dosage Information: Automated Exposure Control was utilized.    Comparison: Comparison is with abdomen study dated 2022-10-22 09:44:39. Comparison is with chest study dated 2021-09-05 11:05:57.    Clinical History: Sepsis.    Findings:  Soft Tissues: Unremarkable.  Lines and Tubes: None.  Neck: The visualized soft tissues of the neck appear unremarkable.  Mediastinum: The mediastinal structures are within normal limits.  Heart: The heart size is within normal limits. Mild coronary artery calcification is seen.  Aorta: Unremarkable appearing aorta.  Lungs: There is mild non specific dependent change at the lung bases. There are emphysematous changes in the bilateral lungs, more pronounced in the bilateral upper lobes. A 4 cm bulla is seen in the anterior right upper lobe along the restrosternal region. There is a 4 mm ground glass nodule in the left lower lobe best visualized on series 4 image 66.  Pleura: No effusions or pneumothorax are identified.  Bony Structures: Mild degenerative changes are seen in the right shoulder joint.  Spine: Mild spondylolytic changes are seen in the thoracic spine.  Ribs: The ribs appear unremarkable.  Abdomen:  Abdominal Wall: No abdominal wall pathology is seen.  Liver: The liver appears unremarkable.  Biliary System: No intrahepatic or extrahepatic  biliary duct dilatation is seen.  Gallbladder: The gallbladder appears unremarkable.  Pancreas: The pancreas appears unremarkable.  Adrenals: The adrenal glands appear unremarkable.  Kidneys: The kidneys appear unremarkable with no stones cysts masses or hydronephrosis.  Aorta: There is moderate calcification of the.  Bowel:  Esophagus: The visualized esophagus appears unremarkable.  Stomach: The stomach is markedly fluid distended.  Duodenum: The 3rd duodenal segment is not well delineated.  Appendix: The appendix is not identified but no inflammatory changes are seen in the right lower quadrant to suggest appendicitis.  Peritoneum: No intraperitoneal free air or ascites is seen.    Pelvis:  Bladder: The bladder is nondistended however the bladder wall appears thickened after considering state of nondistension which could reflect an element of cystitis.  Male:  Prostate gland: The prostate gland is mildly enlarged. There are numerous calcifications prostate gland.    Bony structures:  Dorsal Spine: There is moderate spondylosis of the visualized dorsal spine. There is grade I retrolisthesis of L1 over L2. There is severe disc space narrowing at L1-L2 with pronounced sclerosis of the apposing endplates.  Bony Pelvis: The visualized bony structures of the pelvis appear unremarkable.      Impression:  1. The bladder is nondistended however the bladder wall appears thickened after considering state of nondistension which could reflect an element of cystitis.  2. There are emphysematous changes in the bilateral lungs, more pronounced in the bilateral upper lobes. A 4 cm bulla is seen in the anterior right upper lobe along the restrosternal region. There is a 4 mm ground glass nodule in the left lower lobe best visualized on series 4 image 66.  3. The stomach is markedly fluid distended.  4. The 3rd duodenal segment is not well delineated. Possibility of partial bowel obstruction secondary to SMA syndrome is not  excluded. Correlate with clinical and laboratory findings as regards additional evaluation and follow-up to full resolution as indicated.  5. Details and other findings as discussed above.                                         CT Head Without Contrast (Preliminary result)  Result time 03/09/24 03:17:53      Preliminary result by Interface, Rad Results In (03/09/24 03:17:53)                   Narrative:    START OF REPORT:  Technique: CT of the head was performed without intravenous contrast with axial as well as coronal and sagittal images.    Comparison: Comparison is with study dated2012-08-28 17:28:39.    Dosage Information: Automated exposure control was utilized.    Clinical history: Cerebrovascular Accident (Pt arrives via [facility], EMS reports pt last seen normal at 0000, spouse reported to EMS pt speech slurred, numbness in the left face. On arrival pt has mild slurred speech, no facial asymmetry, no decreased strength, decreased sensation to the RLE.    Findings:  Hemorrhage: No acute intracranial hemorrhage is seen.  CSF spaces: The ventricles, sulci and basal cisterns all appear somewhat prominent suggesting an element of global cerebral atrophy.  Brain parenchyma: There is preservation of the grey white junction throughout. No acute infarct. Subtle scattered microvascular change is seen in portions of the periventricular and deep white matter tracts.  Cerebellum: Unremarkable.  Vascular: Unremarkable venous sinuses.  Sella and skull base: The sella appears to be within normal limits for age.  Cerebellopontine angles: Within normal limits.  Herniation: None.  Intracranial calcifications: Incidental note is made of bilateral choroid plexus calcification. Incidental note is made of some pineal region calcification.  Calvarium: No acute linear or depressed skull fracture is seen.    Maxillofacial Structures:  Paranasal sinuses: The visualized paranasal sinuses appear clear with no mucoperiosteal thickening or  air fluid levels identified.  Orbits: The orbits appear unremarkable.  Zygomatic arches: The zygomatic arches are intact and unremarkable.  Temporal bones and mastoids: The bilateral somewhat sclerotic suggesting chronic mastoiditis.  TMJ: The mandibular condyles appear normally placed with respect to the mandibular fossa.      Impression:  1. No acute intracranial process identified. Details and other findings as noted above.                                         X-Ray Chest AP Portable (In process)                          Medical Decision Making  54-year-old initially presenting as a possible stroke due to slurred speech however on arrival patient neuro intact reporting that he feels like his speech is slurred because his mouth is so dry  Patient states he woke up like this at midnight smoked methamphetamines 2 days ago  No neuro deficits on exam but patient with dry mucous membranes mottled extremities  Given clinical appearance on arrival started resuscitation with sepsis protocol broad-spectrum antibiotics and 30 cc/kilogram of IV fluids  Workup consistent with OKSANA dehydration rhabdomyolysis lactic acidosis CT head negative acidosis compensated on ABG pH 7.42  Hemodynamically stable in the ER discussed workup with hospitalist who accepts admission  CT pan scan obtained to evaluate for source of sepsis - possible early small-bowel obstruction?  Patient has no nausea vomiting or abdominal pain  UA with possible UTI - this will be treated by his broad-spectrum antibiotics      The differential diagnosis includes, but is not limited to:  CVA intracranial hemorrhage OKSANA electrolyte derangement rhabdo sepsis dehydration    Sepsis Perfusion Assessment: I attest a sepsis perfusion exam was performed within 6 hours of sepsis, severe sepsis, or septic shock presentation, following fluid resuscitation.    Problems Addressed:  Dehydration: acute illness or injury that poses a threat to life or bodily functions  Lactic  acidosis: acute illness or injury that poses a threat to life or bodily functions  Methamphetamine use: acute illness or injury that poses a threat to life or bodily functions  Non-traumatic rhabdomyolysis: acute illness or injury that poses a threat to life or bodily functions  Renal failure, unspecified chronicity: acute illness or injury that poses a threat to life or bodily functions  Sepsis, due to unspecified organism, unspecified whether acute organ dysfunction present: acute illness or injury that poses a threat to life or bodily functions    Amount and/or Complexity of Data Reviewed  Independent Historian: spouse and EMS     Details: Per EMS, pt woke up at midnight with slurred speech and numbness to his mouth and left arm/fingers. Collateral information obtained from wife, she states his smile and speech is back to normal, denies any facial asymmetry. Reportedly, pt was diagnosed with PNA after presenting for chronic back pain on 3/5.  External Data Reviewed: notes.     Details: Left ama after presenting sever days ago for possible pneumonia   Labs: ordered. Decision-making details documented in ED Course.  Radiology: ordered and independent interpretation performed.     Details: CXR - no obvious infiltrates, consolidations, pleural effusions, or pneumothorax.      Discussion of management or test interpretation with external provider(s): Discussed with hospitalist - will admit     Risk  Prescription drug management.  Drug therapy requiring intensive monitoring for toxicity.  Decision regarding hospitalization.    Critical Care  Total time providing critical care: 60 minutes            Scribe Attestation:   Scribe #1: I performed the above scribed service and the documentation accurately describes the services I performed. I attest to the accuracy of the note.    Attending Attestation:           Physician Attestation for Scribe:  Physician Attestation Statement for Scribe #1: Lauri MARCUS Adolph B IV, MD,  reviewed documentation, as scribed by Yessica Cochran in my presence, and it is both accurate and complete.             ED Course as of 03/09/24 0514   Sat Mar 09, 2024   0058 Called to evaluate for possible stroke activation - per report from EMS patient reports that he woke up at midnight with slurred speech and numbness in his mouth in all extremities.  His speech was slurred per EMS EN route and has been improving on my exam speech is not slurred he has no focal neuro deficits NIH stroke scale of 0.  Will attempt to obtain collateral from wife apparently she left the lobby and went outside to smoke and no one can find her.  Will perform workup at this time does not meet criteria for stroke activation yet [AC]   0110 Wife is now bedside confirms patient's speech is back to normal she does not appreciate any facial droop states that his face is symmetric and normal.  Again patient does not meet criteria for stroke activation at this time.  Apparently he was recently admitted for pneumonia left AMA he was hypoxic at this time mottled appearing in his extremities I do suspect a systemic process such as sepsis or profound dehydration will place on supplemental oxygen start this workup start IV fluid hydration and continue to closely monitor patient at this time [AC]   0225 Lactic elevated leukocytosis will treat empirically with broad-spectrum antibiotics for presumed sepsis positive SIRS criteria at this time [AC]   0227 Lactic Acid Level(!!): 9.4 [AC]   0227 Creatinine(!): 3.82 [AC]   0227 BUN(!): 67.5 [AC]   0228 Given lactic acid leukocytosis tachycardia sepsis protocol initiated - possible pneumonia recently on ER visit I do not see any obvious pneumonia on his chest x-ray here his LFTs are elevated as well will obtain noncontrasted scans given his OKSANA of his chest and abdomen to further assess for source of sepsis [AC]   0344 Abdomen with possible partial small-bowel obstruction?  Patient has no abdominal pain or  "vomiting clinically not consistent [AC]   0346 Severe OKSANA acidotic on labs will check CK given his substance abuse history will check ABG [AC]   0419 CPK [AC]   0447 POC PH: 7.420 [AC]   0506 Hospitalist will admit [AC]      ED Course User Index  [AC] Kasi Carreon IV, MD                 Medical Decision Making:   Clinical Tests:   Sepsis Perfusion Assessment: "I attest a sepsis perfusion exam was performed within 6 hours of sepsis, severe sepsis, or septic shock presentation, following fluid resuscitation."             Clinical Impression:  Final diagnoses:  [A41.9] Sepsis, due to unspecified organism, unspecified whether acute organ dysfunction present  [E86.0] Dehydration  [M62.82] Non-traumatic rhabdomyolysis (Primary)  [F15.10] Methamphetamine use  [E87.20] Lactic acidosis  [N19] Renal failure, unspecified chronicity  [N39.0, R31.9] Urinary tract infection with hematuria, site unspecified          ED Disposition Condition    Admit Stable                Kasi Carreon IV, MD  03/09/24 0514    "

## 2024-03-09 NOTE — PROGRESS NOTES
"Pharmacokinetic Initial Assessment: IV Vancomycin    Assessment/Plan:    Initiate intravenous vancomycin with loading dose of 1750 mg once with subsequent doses when random concentrations are less than 20 mcg/mL  Desired empiric serum trough concentration is 15 to 20 mcg/mL  Draw vancomycin random level on 03/10 at 0430.  Pharmacy will continue to follow and monitor vancomycin.      Please contact pharmacy at extension [phone] with any questions regarding this assessment.     Thank you for the consult,   Rupert Sibleyy       Patient brief summary:  Sebastian Davis is a 54 y.o. male initiated on antimicrobial therapy with IV Vancomycin for treatment of suspected sepsis    Drug Allergies:   Review of patient's allergies indicates:  No Known Allergies    Actual Body Weight:   81.6kg    Renal Function:   Estimated Creatinine Clearance: 23.4 mL/min (A) (based on SCr of 3.82 mg/dL (H)).,     Dialysis Method (if applicable):  N/A    CBC (last 72 hours):  Recent Labs   Lab Result Units 03/09/24  0129   WBC x10(3)/mcL 18.15  18.15*   Hgb g/dL 15.9   Hct % 46.7   Platelet x10(3)/mcL 114*   Monocytes % % 4       Metabolic Panel (last 72 hours):  Recent Labs   Lab Result Units 03/09/24  0129 03/09/24  0217   Sodium Level mmol/L 129*  --    Potassium Level mmol/L 5.2*  --    Chloride mmol/L 91*  --    Carbon Dioxide mmol/L 11*  --    Glucose Level mg/dL 80  --    Glucose, UA   --  Negative   Blood Urea Nitrogen mg/dL 67.5*  --    Creatinine mg/dL 3.82*  --    Albumin Level g/dL 2.7*  --    Bilirubin Total mg/dL 1.4  --    Alkaline Phosphatase unit/L 80  --    Aspartate Aminotransferase unit/L 528*  --    Alanine Aminotransferase unit/L 294*  --    Magnesium Level mg/dL 2.60  --        Drug levels (last 3 results):  No results for input(s): "VANCOMYCINRA", "VANCORANDOM", "VANCOMYCINPE", "VANCOPEAK", "VANCOMYCINTR", "VANCOTROUGH" in the last 72 hours.    Microbiologic Results:  Microbiology Results (last 7 days)       Procedure " Component Value Units Date/Time    Blood culture #1 **CANNOT BE ORDERED STAT** [[id number]] Collected: 03/09/24 0129    Order Status: Resulted Specimen: Blood Updated: 03/09/24 0325    Blood culture #2 **CANNOT BE ORDERED STAT** [[id number]] Collected: 03/09/24 0129    Order Status: Resulted Specimen: Blood Updated: 03/09/24 0325    Urine culture [[id number]] Collected: 03/09/24 0217    Order Status: Sent Specimen: Urine Updated: 03/09/24 0235

## 2024-03-10 LAB
ABS NEUT (OLG): 5.13 X10(3)/MCL (ref 2.1–9.2)
ALBUMIN SERPL-MCNC: 1.8 G/DL (ref 3.5–5)
ALBUMIN/GLOB SERPL: 0.8 RATIO (ref 1.1–2)
ALP SERPL-CCNC: 45 UNIT/L (ref 40–150)
ALT SERPL-CCNC: 586 UNIT/L (ref 0–55)
AST SERPL-CCNC: 863 UNIT/L (ref 5–34)
BILIRUB SERPL-MCNC: 1.6 MG/DL
BUN SERPL-MCNC: 77.5 MG/DL (ref 8.4–25.7)
BURR CELLS (OLG): ABNORMAL
CALCIUM SERPL-MCNC: 6.2 MG/DL (ref 8.4–10.2)
CHLORIDE SERPL-SCNC: 91 MMOL/L (ref 98–107)
CK SERPL-CCNC: ABNORMAL U/L (ref 30–200)
CO2 SERPL-SCNC: 20 MMOL/L (ref 22–29)
CREAT SERPL-MCNC: 2.64 MG/DL (ref 0.73–1.18)
ERYTHROCYTE [DISTWIDTH] IN BLOOD BY AUTOMATED COUNT: 14 % (ref 11.5–17)
FOLATE SERPL-MCNC: 9.1 NG/ML (ref 7–31.4)
GFR SERPLBLD CREATININE-BSD FMLA CKD-EPI: 28 MLS/MIN/1.73/M2
GLOBULIN SER-MCNC: 2.4 GM/DL (ref 2.4–3.5)
GLUCOSE SERPL-MCNC: 63 MG/DL (ref 74–100)
GLUCOSE SERPL-MCNC: 65 MG/DL (ref 74–100)
HCT VFR BLD AUTO: 39.8 % (ref 42–52)
HGB BLD-MCNC: 14.1 G/DL (ref 14–18)
INSTRUMENT WBC (OLG): 6.33 X10(3)/MCL
LDH SERPL-CCNC: 1397 U/L (ref 125–220)
LYMPHOCYTES NFR BLD MANUAL: 0.38 X10(3)/MCL
LYMPHOCYTES NFR BLD MANUAL: 6 %
MACROCYTES BLD QL SMEAR: ABNORMAL
MCH RBC QN AUTO: 30.1 PG (ref 27–31)
MCHC RBC AUTO-ENTMCNC: 35.4 G/DL (ref 33–36)
MCV RBC AUTO: 85 FL (ref 80–94)
METAMYELOCYTES NFR BLD MANUAL: 3 %
MONOCYTES NFR BLD MANUAL: 0.57 X10(3)/MCL (ref 0.1–1.3)
MONOCYTES NFR BLD MANUAL: 9 %
NEUTROPHILS NFR BLD MANUAL: 81 %
NRBC BLD AUTO-RTO: 0 %
PLATELET # BLD AUTO: 47 X10(3)/MCL (ref 130–400)
PLATELET # BLD EST: ABNORMAL 10*3/UL
PLATELETS.RETICULATED NFR BLD AUTO: 22.5 % (ref 0.9–11.2)
PMV BLD AUTO: ABNORMAL FL
POCT GLUCOSE: 126 MG/DL (ref 70–110)
POCT GLUCOSE: 26 MG/DL (ref 70–110)
POCT GLUCOSE: 27 MG/DL (ref 70–110)
POCT GLUCOSE: 92 MG/DL (ref 70–110)
POIKILOCYTOSIS BLD QL SMEAR: ABNORMAL
POTASSIUM SERPL-SCNC: 3.9 MMOL/L (ref 3.5–5.1)
PROT SERPL-MCNC: 4.2 GM/DL (ref 6.4–8.3)
RBC # BLD AUTO: 4.68 X10(6)/MCL (ref 4.7–6.1)
RBC MORPH BLD: ABNORMAL
RET# (OHS): 0.02 X10E6/UL (ref 0.03–0.1)
RETICULOCYTE COUNT AUTOMATED (OLG): 0.46 % (ref 1.1–2.1)
SODIUM SERPL-SCNC: 127 MMOL/L (ref 136–145)
VANCOMYCIN SERPL-MCNC: 13.4 UG/ML (ref 15–20)
WBC # SPEC AUTO: 6.33 X10(3)/MCL (ref 4.5–11.5)

## 2024-03-10 PROCEDURE — 87389 HIV-1 AG W/HIV-1&-2 AB AG IA: CPT | Performed by: INTERNAL MEDICINE

## 2024-03-10 PROCEDURE — 82550 ASSAY OF CK (CPK): CPT | Performed by: INTERNAL MEDICINE

## 2024-03-10 PROCEDURE — 80053 COMPREHEN METABOLIC PANEL: CPT | Performed by: INTERNAL MEDICINE

## 2024-03-10 PROCEDURE — 99900035 HC TECH TIME PER 15 MIN (STAT)

## 2024-03-10 PROCEDURE — 83615 LACTATE (LD) (LDH) ENZYME: CPT | Performed by: INTERNAL MEDICINE

## 2024-03-10 PROCEDURE — 85045 AUTOMATED RETICULOCYTE COUNT: CPT | Performed by: INTERNAL MEDICINE

## 2024-03-10 PROCEDURE — 25000003 PHARM REV CODE 250: Performed by: INTERNAL MEDICINE

## 2024-03-10 PROCEDURE — 25000003 PHARM REV CODE 250: Performed by: STUDENT IN AN ORGANIZED HEALTH CARE EDUCATION/TRAINING PROGRAM

## 2024-03-10 PROCEDURE — S5010 5% DEXTROSE AND 0.45% SALINE: HCPCS | Performed by: INTERNAL MEDICINE

## 2024-03-10 PROCEDURE — 21400001 HC TELEMETRY ROOM

## 2024-03-10 PROCEDURE — 85027 COMPLETE CBC AUTOMATED: CPT | Performed by: NURSE PRACTITIONER

## 2024-03-10 PROCEDURE — 82947 ASSAY GLUCOSE BLOOD QUANT: CPT | Performed by: INTERNAL MEDICINE

## 2024-03-10 PROCEDURE — 82746 ASSAY OF FOLIC ACID SERUM: CPT | Performed by: INTERNAL MEDICINE

## 2024-03-10 PROCEDURE — 80202 ASSAY OF VANCOMYCIN: CPT | Performed by: STUDENT IN AN ORGANIZED HEALTH CARE EDUCATION/TRAINING PROGRAM

## 2024-03-10 PROCEDURE — 63600175 PHARM REV CODE 636 W HCPCS: Performed by: STUDENT IN AN ORGANIZED HEALTH CARE EDUCATION/TRAINING PROGRAM

## 2024-03-10 PROCEDURE — 63600175 PHARM REV CODE 636 W HCPCS: Performed by: INTERNAL MEDICINE

## 2024-03-10 RX ORDER — DEXTROSE MONOHYDRATE AND SODIUM CHLORIDE 5; .45 G/100ML; G/100ML
INJECTION, SOLUTION INTRAVENOUS CONTINUOUS
Status: DISCONTINUED | OUTPATIENT
Start: 2024-03-10 | End: 2024-03-10 | Stop reason: SDUPTHER

## 2024-03-10 RX ORDER — NYSTATIN 100000 [USP'U]/ML
500000 SUSPENSION ORAL 4 TIMES DAILY
Status: DISCONTINUED | OUTPATIENT
Start: 2024-03-10 | End: 2024-03-17 | Stop reason: HOSPADM

## 2024-03-10 RX ORDER — CALCIUM GLUCONATE 20 MG/ML
1 INJECTION, SOLUTION INTRAVENOUS ONCE
Status: COMPLETED | OUTPATIENT
Start: 2024-03-10 | End: 2024-03-10

## 2024-03-10 RX ADMIN — GLUCAGON 1 MG: KIT at 09:03

## 2024-03-10 RX ADMIN — SODIUM BICARBONATE: 84 INJECTION, SOLUTION INTRAVENOUS at 02:03

## 2024-03-10 RX ADMIN — NYSTATIN 500000 UNITS: 100000 SUSPENSION ORAL at 09:03

## 2024-03-10 RX ADMIN — PIPERACILLIN SODIUM AND TAZOBACTAM SODIUM 4.5 G: 4; .5 INJECTION, POWDER, LYOPHILIZED, FOR SOLUTION INTRAVENOUS at 03:03

## 2024-03-10 RX ADMIN — CALCIUM GLUCONATE 1 G: 20 INJECTION, SOLUTION INTRAVENOUS at 05:03

## 2024-03-10 RX ADMIN — PIPERACILLIN SODIUM AND TAZOBACTAM SODIUM 4.5 G: 4; .5 INJECTION, POWDER, LYOPHILIZED, FOR SOLUTION INTRAVENOUS at 09:03

## 2024-03-10 RX ADMIN — NYSTATIN 500000 UNITS: 100000 SUSPENSION ORAL at 05:03

## 2024-03-10 RX ADMIN — NYSTATIN 500000 UNITS: 100000 SUSPENSION ORAL at 02:03

## 2024-03-10 RX ADMIN — PIPERACILLIN SODIUM AND TAZOBACTAM SODIUM 4.5 G: 4; .5 INJECTION, POWDER, LYOPHILIZED, FOR SOLUTION INTRAVENOUS at 02:03

## 2024-03-10 RX ADMIN — VANCOMYCIN HYDROCHLORIDE 1500 MG: 1.5 INJECTION, POWDER, LYOPHILIZED, FOR SOLUTION INTRAVENOUS at 09:03

## 2024-03-10 NOTE — NURSING
Nurses Note -- 4 Eyes      3/9/2024   6:36 PM      Skin assessed during: Admit      [] No Altered Skin Integrity Present    []Prevention Measures Documented      [x] Yes- Altered Skin Integrity Present or Discovered   [x] LDA Added if Not in Epic (Describe Wound)   [x] New Altered Skin Integrity was Present on Admit and Documented in LDA   [x] Wound Image Taken    Wound Care Consulted? No    Attending Nurse:  Jose WATT RN    Second RN/Staff Member:   Sheela STONE RN

## 2024-03-10 NOTE — AI DETERIORATION ALERT
Artificial Intelligence Notification  [facility]  [address]  [place] [address]  Phone: [phone]    This documentation was triggered by an Artificial Intelligence Notification:    Admit Date: 3/9/2024   LOS: 1  Code Status: Full Code  : 1970  Age: 54 y.o.  Weight:   Wt Readings from Last 1 Encounters:   24 81.6 kg (180 lb)        Sex: male  Bed: [address] [place] [address]  MRN: [id number]  Attending Physician: Oniel Overton MD     Date of Alert: 03/10/2024  Time AI Alert Received: 045            Vitals:    03/10/24 0006   BP: 117/81   Pulse: 100   Resp:    Temp: 97.4 °F (36.3 °C)     SpO2: (!) 94 %      Artificial Intelligence alert discussed with Provider:     Name:    Date/Time of Provider Notification:       Patient Condition: See AI alert done by previous shift. Vital signs are improved & MAP >70 since previous alert. Repeat lab work improving appropriately.

## 2024-03-10 NOTE — PROGRESS NOTES
Pharmacokinetic Assessment Follow Up: IV Vancomycin    Vancomycin serum concentration assessment(s):  Vanc random level ~26 hr after one 1750 mg loading dose is slightly below range of 15-20 mcg/mL     Vancomycin Regimen Plan:  - Pt OKSANA improving but still not resolved (BL appears to be 0.78) - will not schedule regimen - continue to pulse dose until RF is stable   - Vanc 1500 mg x1 today @ 0900   - Re-check vanc random 3/11 0800 and monitor RF to consider scheduling regimen vs re-dose      Drug levels (last 3 results):  Recent Labs   Lab Result Units 03/10/24  0547   Vanc Lvl Random ug/ml 13.4*       Vancomycin Administrations:  vancomycin given in the last 96 hours                     vancomycin 1.75 g in 5 % dextrose 500 mL IVPB (mg) 1,750 mg New Bag 03/09/24 0400                    Pharmacy will continue to follow and monitor vancomycin.    Please contact pharmacy at extension [phone] for questions regarding this assessment.    Thank you for the consult,   Emili Cardozo       Patient brief summary:  Sebastian Davis is a 54 y.o. male initiated on antimicrobial therapy with IV Vancomycin for treatment of  sepsis/bacteremia    Drug Allergies:   Review of patient's allergies indicates:  No Known Allergies    Actual Body Weight:  Wt Readings from Last 1 Encounters:   03/09/24 81.6 kg (180 lb)       Renal Function:   Estimated Creatinine Clearance: 33.8 mL/min (A) (based on SCr of 2.64 mg/dL (H)).,     Dialysis Method (if applicable):  N/A    CBC (last 72 hours):  Recent Labs   Lab Result Units 03/09/24  0129 03/09/24  0844 03/09/24  1804 03/10/24  0547   WBC x10(3)/mcL 18.15  18.15* 8.33  8.33 6.68  6.68 6.33  6.33   Hgb g/dL 15.9 16.0 15.0 14.1   Hct % 46.7 46.4 42.8 39.8*   Platelet x10(3)/mcL 114* 94* 66* 47*   Monocytes % % 4 7 12 9       Metabolic Panel (last 72 hours):  Recent Labs   Lab Result Units 03/09/24  0129 03/09/24  0217 03/09/24  0609 03/09/24  0844 03/09/24  1804 03/10/24  0547   Sodium Level  mmol/L 129*  --   --  128* 125* 127*   Urine Sodium mmol/L  --   --  31.0  --   --   --    Potassium Level mmol/L 5.2*  --   --  4.3 3.8 3.9   Chloride mmol/L 91*  --   --  93* 89* 91*   Carbon Dioxide mmol/L 11*  --   --  14* 17* 20*   Glucose Level mg/dL 80  --   --  99 136* 63*   Glucose, UA   --  Negative  --   --   --   --    Blood Urea Nitrogen mg/dL 67.5*  --   --  69.0* 71.0* 77.5*   Creatinine mg/dL 3.82*  --   --  3.22* 3.00* 2.64*   Urine Creatinine mg/dL  --   --  61.9*  --   --   --    Albumin Level g/dL 2.7*  --   --  2.2* 1.9* 1.8*   Bilirubin Total mg/dL 1.4  --   --  1.3 1.3 1.6*   Alkaline Phosphatase unit/L 80  --   --  66 54 45   Aspartate Aminotransferase unit/L 528*  --   --  1,040* 1,140* 863*   Alanine Aminotransferase unit/L 294*  --   --  584* 654* 586*   Magnesium Level mg/dL 2.60  --   --   --  2.00  --        Microbiologic Results:  Microbiology Results (last 7 days)       Procedure Component Value Units Date/Time    Urine culture [[id number]] Collected: 03/09/24 0217    Order Status: Completed Specimen: Urine Updated: 03/10/24 0700     Urine Culture No Growth At 24 Hours    Blood culture #2 **CANNOT BE ORDERED STAT** [[id number]]  (Abnormal) Collected: 03/09/24 0129    Order Status: Completed Specimen: Blood Updated: 03/09/24 2045     GRAM STAIN 2 of 2 bottles positive      Gram Positive Cocci, probable Streptococcus      Seen in gram stain of broth only    BCID2 Panel [[id number]]  (Abnormal) Collected: 03/09/24 0129    Order Status: Completed Specimen: Blood Updated: 03/09/24 1705     CTX-M (ESBL ) N/A     IMP (Cabapenemase ) N/A     KPC resistance gene (Carbapenemase ) N/A     mcr-1 N/A     mecA ID N/A     Comment: Note: Antimicrobial resistance can occur via multiple mechanisms. A Not Detected result for antimicrobial resistance gene(s) does not indicate antimicrobial susceptibility. Subculturing is required for species identification and susceptibility  testing of   isolates.        mecA/C and MREJ (MRSA) gene N/A     NDM (Carbapenemase ) N/A     OXA-48-like (Carbapenemase ) N/A     Isela/B (VRE gene) N/A     VIM (Carbapenemase ) N/A     Enterococcus faecalis Not Detected     Enterococcus faecium Not Detected     Listeria monocytogenes Not Detected     Staphylococcus spp. Not Detected     Staphylococcus aureus Not Detected     Staphylococcus epidermidis Not Detected     Staphylococcus lugdunensis Not Detected     Streptococcus spp. Detected     Streptococcus agalactiae (Group B) Not Detected     Streptococcus pneumoniae Not Detected     Streptococcus pyogenes (Group A) Not Detected     Acinetobacter calcoaceticus/baumannii complex Not Detected     Bacteroides fragilis Not Detected     Enterobacterales Not Detected     Enterobacter cloacae complex Not Detected     Escherichia coli Not Detected     Klebsiella aerogenes Not Detected     Klebsiella oxytoca Not Detected     Klebsiella pneumoniae group Not Detected     Proteus spp. Not Detected     Salmonella spp. Not Detected     Serratia marcescens Not Detected     Haemophilus influenzae Not Detected     Neisseria meningitidis Not Detected     Pseudomonas aeruginosa Not Detected     Stenotrophomonas maltophilia Not Detected     Candida albicans Not Detected     Candida auris Not Detected     Candida glabrata Not Detected     Candida krusei Not Detected     Candida parapsilosis Not Detected     Candida tropicalis Not Detected     Cryptococcus neoformans/gattii Not Detected    Narrative:      The [employer] BCID2 Panel is a multiplexed nucleic acid test intended for the use with [employer]® 2.0 or [employer]® [employer] for the simultaneous qualitative detection and identification of multiple bacterial and yeast nucleic acids and select genetic determinants associated with antimicrobial resistance.  The [employer] BCID2 Panel test is performed directly on blood culture samples  identified as positive by a continuous monitoring blood culture system.  Results are intended to be interpreted in conjunction with Gram stain results.    Blood culture #1 **CANNOT BE ORDERED STAT** [[id number]]  (Abnormal) Collected: 03/09/24 0129    Order Status: Completed Specimen: Blood Updated: 03/09/24 1545     GRAM STAIN 1 of 1 Aerobic bottle positive      Gram Positive Cocci, probable Streptococcus      Seen in gram stain of broth only

## 2024-03-10 NOTE — AI DETERIORATION ALERT
Artificial Intelligence Notification  [facility]  [address]  [place] [address]  Phone: [phone]    This documentation was triggered by an Artificial Intelligence Notification:    Admit Date: 3/9/2024   LOS: 1  Code Status: Full Code  : 1970  Age: 54 y.o.  Weight:   Wt Readings from Last 1 Encounters:   24 81.6 kg (180 lb)        Sex: male  Bed: [address] [place] [address]  MRN: [id number]  Attending Physician: Oniel Overton MD     Date of Alert: 03/10/2024  Time AI Alert Received: 902            Vitals:    03/10/24 1153   BP: 101/69   Pulse: 107   Resp: 18   Temp: 97.8 °F (36.6 °C)     SpO2: (!) 94 %      Artificial Intelligence alert discussed with Provider:     Name:    Date/Time of Provider Notification:       Patient Condition: Chart reviewed.  AI alert done yesterday and today @ 1249am on pt.  Labs are continuing to improve. ICU available if needed.

## 2024-03-11 LAB
ABS NEUT (OLG): 14.46 X10(3)/MCL (ref 2.1–9.2)
ALBUMIN SERPL-MCNC: 1.6 G/DL (ref 3.5–5)
ALBUMIN/GLOB SERPL: 0.6 RATIO (ref 1.1–2)
ALP SERPL-CCNC: 50 UNIT/L (ref 40–150)
ALT SERPL-CCNC: 410 UNIT/L (ref 0–55)
AST SERPL-CCNC: 494 UNIT/L (ref 5–34)
BACTERIA BLD CULT: ABNORMAL
BACTERIA BLD CULT: ABNORMAL
BACTERIA UR CULT: NO GROWTH
BILIRUB SERPL-MCNC: 1.5 MG/DL
BSA FOR ECHO PROCEDURE: 2.01 M2
BUN SERPL-MCNC: 75.1 MG/DL (ref 8.4–25.7)
BURR CELLS (OLG): ABNORMAL
CALCIUM SERPL-MCNC: 6.8 MG/DL (ref 8.4–10.2)
CHLORIDE SERPL-SCNC: 89 MMOL/L (ref 98–107)
CK SERPL-CCNC: 3972 U/L (ref 30–200)
CO2 SERPL-SCNC: 26 MMOL/L (ref 22–29)
CREAT SERPL-MCNC: 2.08 MG/DL (ref 0.73–1.18)
DIRECT COOMBS (DAT): NORMAL
ERYTHROCYTE [DISTWIDTH] IN BLOOD BY AUTOMATED COUNT: 14 % (ref 11.5–17)
GFR SERPLBLD CREATININE-BSD FMLA CKD-EPI: 38 MLS/MIN/1.73/M2
GLOBULIN SER-MCNC: 2.6 GM/DL (ref 2.4–3.5)
GLUCOSE SERPL-MCNC: 115 MG/DL (ref 74–100)
GRAM STN SPEC: ABNORMAL
HAPTOGLOB SERPL-MCNC: 149 MG/DL (ref 14–258)
HAPTOGLOB SERPL-MCNC: 152 MG/DL (ref 14–258)
HAV IGM SERPL QL IA: NONREACTIVE
HBV CORE IGM SERPL QL IA: NONREACTIVE
HBV SURFACE AG SERPL QL IA: NONREACTIVE
HCT VFR BLD AUTO: 34.8 % (ref 42–52)
HCV AB SERPL QL IA: REACTIVE
HEMATOLOGIST REVIEW: NORMAL
HGB BLD-MCNC: 12.8 G/DL (ref 14–18)
HIV 1+2 AB+HIV1 P24 AG SERPL QL IA: NONREACTIVE
INSTRUMENT WBC (OLG): 14.75 X10(3)/MCL
MCH RBC QN AUTO: 30.8 PG (ref 27–31)
MCHC RBC AUTO-ENTMCNC: 36.8 G/DL (ref 33–36)
MCV RBC AUTO: 83.7 FL (ref 80–94)
MONOCYTES NFR BLD MANUAL: 0.29 X10(3)/MCL (ref 0.1–1.3)
MONOCYTES NFR BLD MANUAL: 2 %
NEUTROPHILS NFR BLD MANUAL: 98 %
NRBC BLD AUTO-RTO: 0 %
OHS QRS DURATION: 74 MS
OHS QRS DURATION: 76 MS
OHS QTC CALCULATION: 438 MS
OHS QTC CALCULATION: 438 MS
PLATELET # BLD AUTO: 24 X10(3)/MCL (ref 130–400)
PLATELET # BLD EST: ABNORMAL 10*3/UL
PLATELETS.RETICULATED NFR BLD AUTO: 26.4 % (ref 0.9–11.2)
PMV BLD AUTO: ABNORMAL FL
POCT GLUCOSE: 106 MG/DL (ref 70–110)
POIKILOCYTOSIS BLD QL SMEAR: ABNORMAL
POTASSIUM SERPL-SCNC: 3.5 MMOL/L (ref 3.5–5.1)
PROT SERPL-MCNC: 4.2 GM/DL (ref 6.4–8.3)
RBC # BLD AUTO: 4.16 X10(6)/MCL (ref 4.7–6.1)
RBC MORPH BLD: ABNORMAL
SODIUM SERPL-SCNC: 130 MMOL/L (ref 136–145)
WBC # SPEC AUTO: 14.75 X10(3)/MCL (ref 4.5–11.5)

## 2024-03-11 PROCEDURE — 21400001 HC TELEMETRY ROOM

## 2024-03-11 PROCEDURE — S5010 5% DEXTROSE AND 0.45% SALINE: HCPCS | Performed by: INTERNAL MEDICINE

## 2024-03-11 PROCEDURE — 94760 N-INVAS EAR/PLS OXIMETRY 1: CPT

## 2024-03-11 PROCEDURE — 82550 ASSAY OF CK (CPK): CPT | Performed by: INTERNAL MEDICINE

## 2024-03-11 PROCEDURE — 86880 COOMBS TEST DIRECT: CPT | Performed by: INTERNAL MEDICINE

## 2024-03-11 PROCEDURE — 85027 COMPLETE CBC AUTOMATED: CPT | Performed by: INTERNAL MEDICINE

## 2024-03-11 PROCEDURE — 83010 ASSAY OF HAPTOGLOBIN QUANT: CPT | Performed by: STUDENT IN AN ORGANIZED HEALTH CARE EDUCATION/TRAINING PROGRAM

## 2024-03-11 PROCEDURE — 63600175 PHARM REV CODE 636 W HCPCS: Performed by: STUDENT IN AN ORGANIZED HEALTH CARE EDUCATION/TRAINING PROGRAM

## 2024-03-11 PROCEDURE — 94761 N-INVAS EAR/PLS OXIMETRY MLT: CPT

## 2024-03-11 PROCEDURE — 83010 ASSAY OF HAPTOGLOBIN QUANT: CPT | Performed by: INTERNAL MEDICINE

## 2024-03-11 PROCEDURE — 99900035 HC TECH TIME PER 15 MIN (STAT)

## 2024-03-11 PROCEDURE — 25000003 PHARM REV CODE 250: Performed by: INTERNAL MEDICINE

## 2024-03-11 PROCEDURE — 27000221 HC OXYGEN, UP TO 24 HOURS

## 2024-03-11 PROCEDURE — 80053 COMPREHEN METABOLIC PANEL: CPT | Performed by: INTERNAL MEDICINE

## 2024-03-11 PROCEDURE — 25000003 PHARM REV CODE 250: Performed by: STUDENT IN AN ORGANIZED HEALTH CARE EDUCATION/TRAINING PROGRAM

## 2024-03-11 PROCEDURE — 63600175 PHARM REV CODE 636 W HCPCS: Performed by: INTERNAL MEDICINE

## 2024-03-11 RX ORDER — DEXTROSE MONOHYDRATE AND SODIUM CHLORIDE 5; .9 G/100ML; G/100ML
INJECTION, SOLUTION INTRAVENOUS CONTINUOUS
Status: DISCONTINUED | OUTPATIENT
Start: 2024-03-11 | End: 2024-03-17 | Stop reason: HOSPADM

## 2024-03-11 RX ORDER — FOLIC ACID 1 MG/1
1 TABLET ORAL DAILY
Status: DISCONTINUED | OUTPATIENT
Start: 2024-03-12 | End: 2024-03-17 | Stop reason: HOSPADM

## 2024-03-11 RX ORDER — HYDROMORPHONE HYDROCHLORIDE 2 MG/ML
1 INJECTION, SOLUTION INTRAMUSCULAR; INTRAVENOUS; SUBCUTANEOUS ONCE
Status: COMPLETED | OUTPATIENT
Start: 2024-03-11 | End: 2024-03-11

## 2024-03-11 RX ADMIN — NYSTATIN 500000 UNITS: 100000 SUSPENSION ORAL at 05:03

## 2024-03-11 RX ADMIN — NYSTATIN 500000 UNITS: 100000 SUSPENSION ORAL at 09:03

## 2024-03-11 RX ADMIN — PIPERACILLIN SODIUM AND TAZOBACTAM SODIUM 4.5 G: 4; .5 INJECTION, POWDER, LYOPHILIZED, FOR SOLUTION INTRAVENOUS at 11:03

## 2024-03-11 RX ADMIN — PIPERACILLIN SODIUM AND TAZOBACTAM SODIUM 4.5 G: 4; .5 INJECTION, POWDER, LYOPHILIZED, FOR SOLUTION INTRAVENOUS at 03:03

## 2024-03-11 RX ADMIN — DEXTROSE AND SODIUM CHLORIDE: 5; 900 INJECTION, SOLUTION INTRAVENOUS at 04:03

## 2024-03-11 RX ADMIN — SODIUM BICARBONATE: 84 INJECTION, SOLUTION INTRAVENOUS at 12:03

## 2024-03-11 RX ADMIN — PIPERACILLIN SODIUM AND TAZOBACTAM SODIUM 4.5 G: 4; .5 INJECTION, POWDER, LYOPHILIZED, FOR SOLUTION INTRAVENOUS at 09:03

## 2024-03-11 RX ADMIN — HYDROMORPHONE HYDROCHLORIDE 1 MG: 2 INJECTION INTRAMUSCULAR; INTRAVENOUS; SUBCUTANEOUS at 04:03

## 2024-03-11 RX ADMIN — NYSTATIN 500000 UNITS: 100000 SUSPENSION ORAL at 01:03

## 2024-03-11 NOTE — AI DETERIORATION ALERT
Artificial Intelligence Notification  [facility]  [address]  [place] [address]  Phone: [phone]    This documentation was triggered by an Artificial Intelligence Notification:    Admit Date: 3/9/2024   LOS: 2  Code Status: Full Code  : 1970  Age: 54 y.o.  Weight:   Wt Readings from Last 1 Encounters:   24 81.6 kg (180 lb)        Sex: male  Bed: [address] [place] [address]  MRN: [id number]  Attending Physician: Oniel Overton MD     Date of Alert: 2024  Time AI Alert Received: 0203    Vitals:    24 0100   BP: 119/88   Pulse: 103   Resp: (!) 33   Temp:      SpO2: (!) 91 %    Patient Condition: See previous AI alert. Labs improving, vital signs stable. Please call with any questions or concerns.

## 2024-03-11 NOTE — PLAN OF CARE
Psych consult placed as ordered for counseling.    Elle with [facility] has notified me that they do not do counseling. I informed her that  I will ask Dr Overton about the consult. I asked Dr Overton and counseling not needed at this time.     I informed Elle of this.

## 2024-03-11 NOTE — PROGRESS NOTES
[facility] Hospital Medicine Progress Note        Chief Complaint: Inpatient Follow-up for sepsis    HPI per admitting team:   Sebastian Davis is a 54 y.o. male with a PMHx of polysubstance abuse and chronic back pain  who presented to [facility] via EMS on 3/9/2024 with c/o generalized weakness and numbness to fingers and toes, slurred speech and numbness to the left side of his face upon awakening at midnight.  Patient last smoked methamphetamine  and marijuana x3 days ago.  He denied any IV drug use, fever, CP. Symptoms apparently resolved prior to arriving in the ED. NIH of 0 and resolution of symptoms therefore stroke workup not obtained initially.  However patient began experiencing these symptoms again while in the ED. He also complains of bilateral lower extremity weakness, numbness.  He also endorsed a fall x1 day ago.     Of note, patient was seen in the ED on 03/05/2024 with complaints of chronic back pain requesting pain medication also noted to have a fever and cough; CXR demonstrated confluent airspace opacities in the left infrahilar region and early infiltrate can not be excluded.  He was discharged home on Augmentin and azithromycin for pneumonia. BLOOD CULTURES X2 FROM 3/6/2024 POSITIVE FOR Streptococcus dysgalactiae ssp equisimilis.     Upon presentation to ED, vital signed included /108, , RR 18, temperature 98.3° F. Labs notable for WBC 18.15, platelets 114, sodium 129, potassium 5.2, chloride 91, CO2 11, BUN 67.5, creatinine 3.82, calcium 7.9, albumin 2.7, , , CPK 10,110.  Lactic acid elevated at 9.4 and repeat 7.6.  Acetaminophen and salicylate level undetectable.  UDS positive for amphetamines, fentanyl and cannabinoids.  Influenza, RSV and COVID-19 negative.  Urinalysis with 2+ protein, trace ketones, 3+ blood, positive nitrites, 2+ bilirubin, 4 urobilinogen, 6-10 RBCs, 21-50 WBCs and moderate bacteria.  Urine creatinine 61.9.  Blood  cultures x2 and urine culture pending. CT head without contrast negative for acute intracranial process.  CT chest abdomen and pelvis without contrast demonstrated nondistended bladder however bladder wall appears thickened which could reflect an element of cystitis; emphysematous changes in bilateral lungs more pronounced in bilateral upper lobes, 4 cm bulla seen in the anterior right upper lobe along the retrosternal region and a 4 mm ground-glass nodule in the left lower lobe; stomach is markedly fluid distended; 3rd duodenal segment is not well delineated possibility of partial bowel obstruction secondary to SMA syndrome is not excluded.  He was started on broad-spectrum IV antibiotics in ED, also received sodium bicarbonate 50 mEq, and calcium gluconate 1 g in ED.  Admitted to hospital medicine service for further medical management.     At the time of exam patient is pale, diaphoretic, tachycardic, dyspneic on supplemental oxygen.  Notified by nurse that lactic acid and CPK are up trending.  ICU nurse, ED nurse and nursing supervisor present at the bedside.  Attending MD notified.            Interval Hx:     03/10/2024 Dr. Overton-chart reviewed patient examined.  Patient admitted for sepsis with blood cultures already positive from a previous ER visit 2 days before this admission.  Patient was found to have Streptococcus dysgalactiae of the ER visit 3/6/24.  On this admission 03/09/2024 patient was admitted and placed on vancomycin and Zosyn.  Also he had findings of rhabdomyolysis/OKSANA/hypotensive/electrolyte abnormalities.  Patient was fluid resuscitated and vital signs much improved.  He had issues with hypoglycemia in the presence of sepsis that resolved with glucagon and changing fluids to dextrose.  Patient has an anion gap metabolic acidosis with findings of elevated lactic acid/very low CO2/rhabdo and we decided to place him on a sodium bicarb drip.  Overall he is improved.  I reviewed his CT of abdomen  since he remains very tender and have decided to place him NPO secondary to fluid-filled stomach.  We will DC vancomycin and continue Zosyn.  We will continue to replace electrolytes.  His platelets have dropped to around 26,000. We will DC Lovenox workup thrombocytopenia to exclude any causes or than sepsis          3/11/24 dr evangelina Burkett looks improved  from admission . Plt's dropped even further. Peripheral smear with thrombocytopenia with small plt clumps. No active bleeding . Will repeat blood cx's for tomorrow am and will consult ID since pt may need FREEMAN since TTE  w poor windows. Hematology consulted for eval of profound thrombocytopenia on a pt with decrease retic count and sepsis. Will start liquid diet . Creatinine is trending down. Ck's at around 3000. Co2  26, will dc sodium b icarbonate drip and continue d5 nss to avoid hypoglycemia . Blood sugars wnl, no further hypoglycemia .            Case was discussed with patient's nurse and  on the floor.  Objective/physical exam:  General: a./a o x 3 .complains of abd pain on palpation   Chest: Clear to auscultation bilaterally  Heart: tachycardic at 108, +S1, S2, no appreciable murmur  Abdomen: voluntary guarding with generalized tenderness on palpation, Bs+  MSK: Warm, no lower extremity edema, no clubbing or cyanosis  Neurologic: Alert and oriented x4, Cranial nerve II-XII intact, Strength 5/5 in all 4 extremities    VITAL SIGNS: 24 HRS MIN & MAX LAST   Temp  Min: 97.4 °F (36.3 °C)  Max: 97.8 °F (36.6 °C) 97.4 °F (36.3 °C)   BP  Min: 117/83  Max: 122/89 122/89   Pulse  Min: 102  Max: 110  102   Resp  Min: 26  Max: 34 (!) 26   SpO2  Min: 91 %  Max: 95 % (!) 93 %     I have reviewed the following labs:  Recent Labs   Lab 03/06/24  0118 03/06/24  0118 03/09/24  0129 03/09/24  0844 03/09/24  1804 03/10/24  0547 03/11/24  0833   WBC 15.99*   < > 18.15  18.15* 8.33  8.33 6.68  6.68 6.33  6.33 14.75  14.75*   RBC 4.93  --  5.26 5.29 4.93 4.68* 4.16*    HGB 15.2  --  15.9 16.0 15.0 14.1 12.8*   HCT 45.0  --  46.7 46.4 42.8 39.8* 34.8*   MCV 91.3  --  88.8 87.7 86.8 85.0 83.7   MCH 30.8  --  30.2 30.2 30.4 30.1 30.8   MCHC 33.8  --  34.0 34.5 35.0 35.4 36.8*   RDW 13.9  --  14.3 14.4 14.1 14.0 14.0     --  114* 94* 66* 47* 24*   MPV 11.4*  --  12.8* 12.7*  --   --   --     < > = values in this interval not displayed.       Recent Labs   Lab 03/09/24  0129 03/09/24  0410 03/09/24  0844 03/09/24  1804 03/10/24  0547 03/11/24  0422   *  --    < > 125* 127* 130*   K 5.2*  --    < > 3.8 3.9 3.5   CO2 11*  --    < > 17* 20* 26   BUN 67.5*  --    < > 71.0* 77.5* 75.1*   CREATININE 3.82*  --    < > 3.00* 2.64* 2.08*   CALCIUM 7.9*  --    < > 6.9* 6.2* 6.8*   PH  --  7.420  --   --   --   --    MG 2.60  --   --  2.00  --   --    ALBUMIN 2.7*  --    < > 1.9* 1.8* 1.6*   ALKPHOS 80  --    < > 54 45 50   *  --    < > 654* 586* 410*   *  --    < > 1,140* 863* 494*   BILITOT 1.4  --    < > 1.3 1.6* 1.5    < > = values in this interval not displayed.       Microbiology Results (last 7 days)       Procedure Component Value Units Date/Time    Blood culture #1 **CANNOT BE ORDERED STAT** [[id number]]  (Abnormal)  (Susceptibility) Collected: 03/09/24 0129    Order Status: Completed Specimen: Blood Updated: 03/11/24 1207     CULTURE, BLOOD ([facility]) Streptococcus dysgalactiae ssp equisimilis     GRAM STAIN 1 of 1 Aerobic bottle positive      Gram Positive Cocci, probable Streptococcus      Seen in gram stain of broth only    Blood culture #2 **CANNOT BE ORDERED STAT** [[id number]]  (Abnormal)  (Susceptibility) Collected: 03/09/24 0129    Order Status: Completed Specimen: Blood Updated: 03/11/24 1152     CULTURE, BLOOD ([facility]) Streptococcus dysgalactiae ssp equisimilis     GRAM STAIN 2 of 2 bottles positive      Gram Positive Cocci, probable Streptococcus      Seen in gram stain of broth only    Urine culture [[id number]] Collected: 03/09/24 0217    Order Status:  Completed Specimen: Urine Updated: 03/11/24 0916     Urine Culture No Growth    Stool Culture [[id number]]     Order Status: Sent Specimen: Stool     BCID2 Panel [[id number]]  (Abnormal) Collected: 03/09/24 0129    Order Status: Completed Specimen: Blood Updated: 03/09/24 1705     CTX-M (ESBL ) N/A     IMP (Cabapenemase ) N/A     KPC resistance gene (Carbapenemase ) N/A     mcr-1 N/A     mecA ID N/A     Comment: Note: Antimicrobial resistance can occur via multiple mechanisms. A Not Detected result for antimicrobial resistance gene(s) does not indicate antimicrobial susceptibility. Subculturing is required for species identification and susceptibility testing of   isolates.        mecA/C and MREJ (MRSA) gene N/A     NDM (Carbapenemase ) N/A     OXA-48-like (Carbapenemase ) N/A     Isela/B (VRE gene) N/A     VIM (Carbapenemase ) N/A     Enterococcus faecalis Not Detected     Enterococcus faecium Not Detected     Listeria monocytogenes Not Detected     Staphylococcus spp. Not Detected     Staphylococcus aureus Not Detected     Staphylococcus epidermidis Not Detected     Staphylococcus lugdunensis Not Detected     Streptococcus spp. Detected     Streptococcus agalactiae (Group B) Not Detected     Streptococcus pneumoniae Not Detected     Streptococcus pyogenes (Group A) Not Detected     Acinetobacter calcoaceticus/baumannii complex Not Detected     Bacteroides fragilis Not Detected     Enterobacterales Not Detected     Enterobacter cloacae complex Not Detected     Escherichia coli Not Detected     Klebsiella aerogenes Not Detected     Klebsiella oxytoca Not Detected     Klebsiella pneumoniae group Not Detected     Proteus spp. Not Detected     Salmonella spp. Not Detected     Serratia marcescens Not Detected     Haemophilus influenzae Not Detected     Neisseria meningitidis Not Detected     Pseudomonas aeruginosa Not Detected     Stenotrophomonas maltophilia Not Detected      Candida albicans Not Detected     Candida auris Not Detected     Candida glabrata Not Detected     Candida krusei Not Detected     Candida parapsilosis Not Detected     Candida tropicalis Not Detected     Cryptococcus neoformans/gattii Not Detected    Narrative:      The [employer] BCID2 Panel is a multiplexed nucleic acid test intended for the use with Bio[employer]® FilmArray® 2.0 or BioFire® FilmArray® [employer] for the simultaneous qualitative detection and identification of multiple bacterial and yeast nucleic acids and select genetic determinants associated with antimicrobial resistance.  The [facility] BCID2 Panel test is performed directly on blood culture samples identified as positive by a continuous monitoring blood culture system.  Results are intended to be interpreted in conjunction with Gram stain results.             See below for Radiology    Scheduled Med:   [START ON 3/12/2024] folic acid  1 mg Oral Daily    HYDROmorphone  1 mg Intravenous Once    nystatin  500,000 Units Oral QID    piperacillin-tazobactam (Zosyn) IV (PEDS and ADULTS) (extended infusion is not appropriate)  4.5 g Intravenous Q8H      Continuous Infusions:   dextrose 5 % and 0.9 % NaCl        PRN Meds:  acetaminophen, aluminum-magnesium hydroxide-simethicone, glucagon (human recombinant), levalbuterol, melatonin, ondansetron, polyethylene glycol, prochlorperazine, senna-docusate 8.6-50 mg, sodium chloride 0.9%     Assessment/Plan:  Severe sepsis secondary to Streptococcus dysgalactiae ssp equisimilis   - zosyn  - will rtepeat blood cxs for tomorrow am     Acute hypoxemic respiratory failure    Possible partial bowel obstruction secondary to SMA syndrome by ct abd  - keep npo secondary to fluid filled stomach  - get kub- nl gas pattern  - gi pcr  - clear liquid diet     OKSANA   - improving   - no obstruction seen on non contrast ct abdomen   - retroperitoneal US- no renal abnormalities     Lactic acidosis  - improving       Rhabdomyolysis  -  dc continue sodium bicarbonate drip   -improving 3900    Transaminitis  -improving    Leukocytosis       Thrombocytopenia  -worsening  - dc lovenox  -peripheral smear w small clumps/folic acid 9/ retic count low     Hyponatremia/Hypochloremia   - continue to rep[lace     Hyperkalemia  - resolved    Intractable hypoglycemia  - post glucagon  - iv fluids change to D5 1/2 nss   - continue to monitor    Abd pain   -initially seen by surgical hospitalist  -clear liquid diet       Hypocalcemia after correction   - calcium gluconate x one 3/10/24  - corrected       Polysubstance abuse-UDS positive for amphetamine, fentanyl, cannabinoids  Tobacco abuse   Chronic back pain  4 mm ground-glass left upper lobe nodule     Plan:   Dc sodium bicarbonate drip   Start D5 nss at 125 cc  ID consult  Heme consult for profound thrombocytopenia/ no active bleeding / low retic count  Folic acid 1 mg  Clear liquid diet  Gi pcr for diarrhea  Am labs   Continue to Hold all heparin / lovenox   Repeat blood cx's for am     Overall improved from admission . May need FREEMAN  since TTE w poor windows.       Cc time 45 minutes   Cc dx- severe sepsis       VTE prophylaxis:     Patient condition:  Stable/Fair/Guarded/ Serious/ Critical    Anticipated discharge and Disposition:         All diagnosis and differential diagnosis have been reviewed; assessment and plan has been documented; I have personally reviewed the labs and test results that are presently available; I have reviewed the patients medication list; I have reviewed the consulting providers response and recommendations. I have reviewed or attempted to review medical records based upon their availability    All of the patient's questions have been  addressed and answered. Patient's is agreeable to the above stated plan. I will continue to monitor closely and make adjustments to medical management as  needed.  _____________________________________________________________________    Nutrition Status:    Radiology:  I have personally reviewed the following imaging and agree with the radiologist.     Echo    The valves were not seen well because of subcostal views only, however   there appears to be no regurgitation by color flow doppler.    No pericardial effusion.    Consider FREEMAN for evaluation.       Oniel Overton MD  Department of Hospital Medicine   [facility]   03/11/2024

## 2024-03-11 NOTE — AI DETERIORATION ALERT
Artificial Intelligence Notification  [facility]  [address]  [place] [address]  Phone: [phone]    This documentation was triggered by an Artificial Intelligence Notification:    Admit Date: 3/9/2024   LOS: 2  Code Status: Full Code  : 1970  Age: 54 y.o.  Weight:   Wt Readings from Last 1 Encounters:   24 81.6 kg (180 lb)        Sex: male  Bed: [address] [place] [address]  MRN: [id number]  Attending Physician: Oniel Overton MD     Date of Alert: 2024  Time AI Alert Received: 1017            Vitals:    24 0413   BP:    Pulse:    Resp: (!) 26   Temp:      SpO2: (!) 93 %      Artificial Intelligence alert discussed with Provider:     Name:    Date/Time of Provider Notification:       Patient Condition: Pt repeatedly having AI Alerts. Labs slowly improving, but platelet count is more decreased this am. Lovenox was stopped on yesterday per Attending d/t thrombocytopenia.ICU avb as needed.

## 2024-03-11 NOTE — PLAN OF CARE
03/11/24 0856   Discharge Assessment   Assessment Type Discharge Planning Assessment   Confirmed/corrected address, phone number and insurance Yes   Confirmed Demographics Correct on Facesheet   Source of Information patient   When was your last doctors appointment?   (unsure of last time he saw a pcp)   Does patient/caregiver understand observation status Yes   Communicated JOSE with patient/caregiver Yes   Reason For Admission dehydration, lactic acidosis, meth use, chest pain, uti, renal failure   People in Home significant other   Facility Arrived From: home   Do you expect to return to your current living situation? Yes   Do you have help at home or someone to help you manage your care at home? Yes   Who are your caregiver(s) and their phone number(s)? reports gf can help him   Current cognitive status: Not Oriented to Time   Walking or Climbing Stairs Difficulty no   Dressing/Bathing Difficulty no   Equipment Currently Used at Home none   Do you currently have service(s) that help you manage your care at home? No   Do you take prescription medications? No   Who is going to help you get home at discharge? reports gf   How do you get to doctors appointments? family or friend will provide   Are you on dialysis? No   Discharge Plan A Home   Discharge Plan B Home Health   DME Needed Upon Discharge  none   Discharge Plan discussed with: Patient   Transition of Care Barriers None   Housing Stability   In the last 12 months, was there a time when you were not able to pay the mortgage or rent on time?   (reports he is unsure)   Social Connections   Are you , , , , never , or living with a partner? Living with   Alcohol Use   Q1: How often do you have a drink containing alcohol? Never  (pt reports that he does not use alcohol)     Hx- recent meth use, previously independent, needs TBD, reports that lives with GF.

## 2024-03-12 LAB
ABS NEUT (OLG): 18.97 X10(3)/MCL (ref 2.1–9.2)
ALBUMIN SERPL-MCNC: 1.5 G/DL (ref 3.5–5)
ALBUMIN/GLOB SERPL: 0.5 RATIO (ref 1.1–2)
ALP SERPL-CCNC: 87 UNIT/L (ref 40–150)
ALT SERPL-CCNC: 283 UNIT/L (ref 0–55)
ANION GAP SERPL CALC-SCNC: 12 MEQ/L
AST SERPL-CCNC: 313 UNIT/L (ref 5–34)
BILIRUB SERPL-MCNC: 1.5 MG/DL
BUN SERPL-MCNC: 59.7 MG/DL (ref 8.4–25.7)
BUN SERPL-MCNC: 62.1 MG/DL (ref 8.4–25.7)
CALCIUM SERPL-MCNC: 7.2 MG/DL (ref 8.4–10.2)
CALCIUM SERPL-MCNC: 8 MG/DL (ref 8.4–10.2)
CHLORIDE SERPL-SCNC: 90 MMOL/L (ref 98–107)
CHLORIDE SERPL-SCNC: 91 MMOL/L (ref 98–107)
CK SERPL-CCNC: 1327 U/L (ref 30–200)
CO2 SERPL-SCNC: 31 MMOL/L (ref 22–29)
CO2 SERPL-SCNC: 31 MMOL/L (ref 22–29)
CREAT SERPL-MCNC: 1.51 MG/DL (ref 0.73–1.18)
CREAT SERPL-MCNC: 1.58 MG/DL (ref 0.73–1.18)
CREAT/UREA NIT SERPL: 38
ERYTHROCYTE [DISTWIDTH] IN BLOOD BY AUTOMATED COUNT: 14.1 % (ref 11.5–17)
GFR SERPLBLD CREATININE-BSD FMLA CKD-EPI: 52 MLS/MIN/1.73/M2
GFR SERPLBLD CREATININE-BSD FMLA CKD-EPI: 55 MLS/MIN/1.73/M2
GLOBULIN SER-MCNC: 2.9 GM/DL (ref 2.4–3.5)
GLUCOSE SERPL-MCNC: 118 MG/DL (ref 74–100)
GLUCOSE SERPL-MCNC: 132 MG/DL (ref 74–100)
HCT VFR BLD AUTO: 31.9 % (ref 42–52)
HGB BLD-MCNC: 11.5 G/DL (ref 14–18)
INSTRUMENT WBC (OLG): 19.56 X10(3)/MCL
LACTATE SERPL-SCNC: 2.6 MMOL/L (ref 0.5–2.2)
LYMPHOCYTES NFR BLD MANUAL: 0.39 X10(3)/MCL
LYMPHOCYTES NFR BLD MANUAL: 2 %
MAGNESIUM SERPL-MCNC: 2.3 MG/DL (ref 1.6–2.6)
MCH RBC QN AUTO: 30.3 PG (ref 27–31)
MCHC RBC AUTO-ENTMCNC: 36.1 G/DL (ref 33–36)
MCV RBC AUTO: 83.9 FL (ref 80–94)
MONOCYTES NFR BLD MANUAL: 0.2 X10(3)/MCL (ref 0.1–1.3)
MONOCYTES NFR BLD MANUAL: 1 %
NEUTROPHILS NFR BLD MANUAL: 97 %
NRBC BLD AUTO-RTO: 0 %
PLATELET # BLD AUTO: 28 X10(3)/MCL (ref 130–400)
PLATELET # BLD EST: ABNORMAL 10*3/UL
PLATELETS.RETICULATED NFR BLD AUTO: 19.9 % (ref 0.9–11.2)
PMV BLD AUTO: ABNORMAL FL
POTASSIUM SERPL-SCNC: 2.7 MMOL/L (ref 3.5–5.1)
POTASSIUM SERPL-SCNC: 2.9 MMOL/L (ref 3.5–5.1)
PROT SERPL-MCNC: 4.4 GM/DL (ref 6.4–8.3)
RBC # BLD AUTO: 3.8 X10(6)/MCL (ref 4.7–6.1)
RBC MORPH BLD: NORMAL
SODIUM SERPL-SCNC: 133 MMOL/L (ref 136–145)
SODIUM SERPL-SCNC: 135 MMOL/L (ref 136–145)
WBC # SPEC AUTO: 19.56 X10(3)/MCL (ref 4.5–11.5)

## 2024-03-12 PROCEDURE — 63600175 PHARM REV CODE 636 W HCPCS: Performed by: INTERNAL MEDICINE

## 2024-03-12 PROCEDURE — 99223 1ST HOSP IP/OBS HIGH 75: CPT | Mod: ,,, | Performed by: STUDENT IN AN ORGANIZED HEALTH CARE EDUCATION/TRAINING PROGRAM

## 2024-03-12 PROCEDURE — 25000003 PHARM REV CODE 250: Performed by: STUDENT IN AN ORGANIZED HEALTH CARE EDUCATION/TRAINING PROGRAM

## 2024-03-12 PROCEDURE — 83735 ASSAY OF MAGNESIUM: CPT | Performed by: INTERNAL MEDICINE

## 2024-03-12 PROCEDURE — 80053 COMPREHEN METABOLIC PANEL: CPT | Performed by: INTERNAL MEDICINE

## 2024-03-12 PROCEDURE — 85027 COMPLETE CBC AUTOMATED: CPT | Performed by: INTERNAL MEDICINE

## 2024-03-12 PROCEDURE — 21400001 HC TELEMETRY ROOM

## 2024-03-12 PROCEDURE — 63600175 PHARM REV CODE 636 W HCPCS: Performed by: NURSE PRACTITIONER

## 2024-03-12 PROCEDURE — 25000003 PHARM REV CODE 250: Performed by: NURSE PRACTITIONER

## 2024-03-12 PROCEDURE — 25500020 PHARM REV CODE 255: Performed by: INTERNAL MEDICINE

## 2024-03-12 PROCEDURE — 25000003 PHARM REV CODE 250: Performed by: INTERNAL MEDICINE

## 2024-03-12 PROCEDURE — 83605 ASSAY OF LACTIC ACID: CPT | Performed by: INTERNAL MEDICINE

## 2024-03-12 PROCEDURE — 63600175 PHARM REV CODE 636 W HCPCS: Performed by: STUDENT IN AN ORGANIZED HEALTH CARE EDUCATION/TRAINING PROGRAM

## 2024-03-12 PROCEDURE — 87040 BLOOD CULTURE FOR BACTERIA: CPT | Performed by: INTERNAL MEDICINE

## 2024-03-12 PROCEDURE — 27000221 HC OXYGEN, UP TO 24 HOURS

## 2024-03-12 PROCEDURE — 94761 N-INVAS EAR/PLS OXIMETRY MLT: CPT

## 2024-03-12 PROCEDURE — 82550 ASSAY OF CK (CPK): CPT | Performed by: INTERNAL MEDICINE

## 2024-03-12 RX ORDER — HYDROMORPHONE HYDROCHLORIDE 2 MG/ML
0.5 INJECTION, SOLUTION INTRAMUSCULAR; INTRAVENOUS; SUBCUTANEOUS EVERY 6 HOURS PRN
Status: DISCONTINUED | OUTPATIENT
Start: 2024-03-12 | End: 2024-03-17 | Stop reason: HOSPADM

## 2024-03-12 RX ORDER — POTASSIUM CHLORIDE 14.9 MG/ML
40 INJECTION INTRAVENOUS 2 TIMES DAILY
Status: COMPLETED | OUTPATIENT
Start: 2024-03-12 | End: 2024-03-12

## 2024-03-12 RX ORDER — POTASSIUM CHLORIDE 14.9 MG/ML
20 INJECTION INTRAVENOUS
Status: COMPLETED | OUTPATIENT
Start: 2024-03-12 | End: 2024-03-12

## 2024-03-12 RX ADMIN — HYDROMORPHONE HYDROCHLORIDE 0.5 MG: 2 INJECTION INTRAMUSCULAR; INTRAVENOUS; SUBCUTANEOUS at 09:03

## 2024-03-12 RX ADMIN — NYSTATIN 500000 UNITS: 100000 SUSPENSION ORAL at 04:03

## 2024-03-12 RX ADMIN — POTASSIUM CHLORIDE 40 MEQ: 14.9 INJECTION, SOLUTION INTRAVENOUS at 08:03

## 2024-03-12 RX ADMIN — CEFTRIAXONE SODIUM 2 G: 2 INJECTION, POWDER, FOR SOLUTION INTRAMUSCULAR; INTRAVENOUS at 04:03

## 2024-03-12 RX ADMIN — NYSTATIN 500000 UNITS: 100000 SUSPENSION ORAL at 09:03

## 2024-03-12 RX ADMIN — NYSTATIN 500000 UNITS: 100000 SUSPENSION ORAL at 08:03

## 2024-03-12 RX ADMIN — POTASSIUM BICARBONATE 50 MEQ: 977.5 TABLET, EFFERVESCENT ORAL at 05:03

## 2024-03-12 RX ADMIN — IOHEXOL 100 ML: 350 INJECTION, SOLUTION INTRAVENOUS at 02:03

## 2024-03-12 RX ADMIN — PIPERACILLIN SODIUM AND TAZOBACTAM SODIUM 4.5 G: 4; .5 INJECTION, POWDER, LYOPHILIZED, FOR SOLUTION INTRAVENOUS at 04:03

## 2024-03-12 RX ADMIN — POTASSIUM CHLORIDE 20 MEQ: 14.9 INJECTION, SOLUTION INTRAVENOUS at 07:03

## 2024-03-12 RX ADMIN — POTASSIUM CHLORIDE 40 MEQ: 14.9 INJECTION, SOLUTION INTRAVENOUS at 12:03

## 2024-03-12 RX ADMIN — PIPERACILLIN SODIUM AND TAZOBACTAM SODIUM 4.5 G: 4; .5 INJECTION, POWDER, LYOPHILIZED, FOR SOLUTION INTRAVENOUS at 11:03

## 2024-03-12 RX ADMIN — POTASSIUM CHLORIDE 20 MEQ: 14.9 INJECTION, SOLUTION INTRAVENOUS at 05:03

## 2024-03-12 RX ADMIN — FOLIC ACID 1 MG: 1 TABLET ORAL at 09:03

## 2024-03-12 RX ADMIN — ACETAMINOPHEN 650 MG: 325 TABLET, FILM COATED ORAL at 05:03

## 2024-03-12 NOTE — PROGRESS NOTES
[facility] Hospital Medicine Progress Note        Chief Complaint: Inpatient Follow-up for sepsis    HPI per admitting team:   Sebastian Davis is a 54 y.o. male with a PMHx of polysubstance abuse and chronic back pain  who presented to [facility] via EMS on 3/9/2024 with c/o generalized weakness and numbness to fingers and toes, slurred speech and numbness to the left side of his face upon awakening at midnight.  Patient last smoked methamphetamine  and marijuana x3 days ago.  He denied any IV drug use, fever, CP. Symptoms apparently resolved prior to arriving in the ED. NIH of 0 and resolution of symptoms therefore stroke workup not obtained initially.  However patient began experiencing these symptoms again while in the ED. He also complains of bilateral lower extremity weakness, numbness.  He also endorsed a fall x1 day ago.     Of note, patient was seen in the ED on 03/05/2024 with complaints of chronic back pain requesting pain medication also noted to have a fever and cough; CXR demonstrated confluent airspace opacities in the left infrahilar region and early infiltrate can not be excluded.  He was discharged home on Augmentin and azithromycin for pneumonia. BLOOD CULTURES X2 FROM 3/6/2024 POSITIVE FOR Streptococcus dysgalactiae ssp equisimilis.     Upon presentation to ED, vital signed included /108, , RR 18, temperature 98.3° F. Labs notable for WBC 18.15, platelets 114, sodium 129, potassium 5.2, chloride 91, CO2 11, BUN 67.5, creatinine 3.82, calcium 7.9, albumin 2.7, , , CPK 10,110.  Lactic acid elevated at 9.4 and repeat 7.6.  Acetaminophen and salicylate level undetectable.  UDS positive for amphetamines, fentanyl and cannabinoids.  Influenza, RSV and COVID-19 negative.  Urinalysis with 2+ protein, trace ketones, 3+ blood, positive nitrites, 2+ bilirubin, 4 urobilinogen, 6-10 RBCs, 21-50 WBCs and moderate bacteria.  Urine creatinine 61.9.  Blood  cultures x2 and urine culture pending. CT head without contrast negative for acute intracranial process.  CT chest abdomen and pelvis without contrast demonstrated nondistended bladder however bladder wall appears thickened which could reflect an element of cystitis; emphysematous changes in bilateral lungs more pronounced in bilateral upper lobes, 4 cm bulla seen in the anterior right upper lobe along the retrosternal region and a 4 mm ground-glass nodule in the left lower lobe; stomach is markedly fluid distended; 3rd duodenal segment is not well delineated possibility of partial bowel obstruction secondary to SMA syndrome is not excluded.  He was started on broad-spectrum IV antibiotics in ED, also received sodium bicarbonate 50 mEq, and calcium gluconate 1 g in ED.  Admitted to hospital medicine service for further medical management.     At the time of exam patient is pale, diaphoretic, tachycardic, dyspneic on supplemental oxygen.  Notified by nurse that lactic acid and CPK are up trending.  ICU nurse, ED nurse and nursing supervisor present at the bedside.  Attending MD notified.            Interval Hx:     03/10/2024 Dr. Overton-chart reviewed patient examined.  Patient admitted for sepsis with blood cultures already positive from a previous ER visit 2 days before this admission.  Patient was found to have Streptococcus dysgalactiae of the ER visit 3/6/24.  On this admission 03/09/2024 patient was admitted and placed on vancomycin and Zosyn.  Also he had findings of rhabdomyolysis/OKSANA/hypotensive/electrolyte abnormalities.  Patient was fluid resuscitated and vital signs much improved.  He had issues with hypoglycemia in the presence of sepsis that resolved with glucagon and changing fluids to dextrose.  Patient has an anion gap metabolic acidosis with findings of elevated lactic acid/very low CO2/rhabdo and we decided to place him on a sodium bicarb drip.  Overall he is improved.  I reviewed his CT of abdomen  since he remains very tender and have decided to place him NPO secondary to fluid-filled stomach.  We will DC vancomycin and continue Zosyn.  We will continue to replace electrolytes.  His platelets have dropped to around 26,000. We will DC Lovenox workup thrombocytopenia to exclude any causes or than sepsis          3/11/24 dr hutchinson - looks improved  from admission . Plt's dropped even further. Peripheral smear with thrombocytopenia with small plt clumps. No active bleeding . Will repeat blood cx's for tomorrow am and will consult ID since pt may need FREEMAN since TTE  w poor windows. Hematology consulted for eval of profound thrombocytopenia on a pt with decrease retic count and sepsis. Will start liquid diet . Creatinine is trending down. Ck's at around 3000. Co2  26, will dc sodium b icarbonate drip and continue d5 nss to avoid hypoglycemia . Blood sugars wnl, no further hypoglycemia .      3/12/24 dr hutchinson - looking better/ creat improving as well as plt's. Thrombocytopenia probably related [place] [address] from [id number].  Creat at 1.5. abdomen is distended w/o rebound. Will do ct w contrast and continue fluids at 125 cc.     Blood cx's repeated today . Lactic acid trendingdown             Case was discussed with patient's nurse and  on the floor.  Objective/physical exam:  General: a./a o x 3 .complains of abd pain on palpation   Chest: Clear to auscultation bilaterally  Heart: tachycardic at 108, +S1, S2, no appreciable murmur  Abdomen: voluntary guarding with generalized tenderness on palpation, Bs decrease. Very tender and distended  MSK: Warm, no lower extremity edema, no clubbing or cyanosis  Neurologic: Alert and oriented x4, Cranial nerve II-XII intact, Strength 5/5 in all 4 extremities    VITAL SIGNS: 24 HRS MIN & MAX LAST   Temp  Min: 98.1 °F (36.7 °C)  Max: 98.2 °F (36.8 °C) 98.2 °F (36.8 °C)   BP  Min: 109/79  Max: 136/86 136/86   Pulse  Min: 98  Max: 107  98   Resp  Min: 18  Max:  22 18   SpO2  Min: 88 %  Max: 94 % (!) 90 %     I have reviewed the following labs:  Recent Labs   Lab 03/06/24  0118 03/06/24  0118 03/09/24  0129 03/09/24  0844 03/09/24  1804 03/10/24  0547 03/11/24  0833 03/12/24  0353   WBC 15.99*   < > 18.15  18.15* 8.33  8.33   < > 6.33  6.33 14.75  14.75* 19.56  19.56*   RBC 4.93  --  5.26 5.29   < > 4.68* 4.16* 3.80*   HGB 15.2  --  15.9 16.0   < > 14.1 12.8* 11.5*   HCT 45.0  --  46.7 46.4   < > 39.8* 34.8* 31.9*   MCV 91.3  --  88.8 87.7   < > 85.0 83.7 83.9   MCH 30.8  --  30.2 30.2   < > 30.1 30.8 30.3   MCHC 33.8  --  34.0 34.5   < > 35.4 36.8* 36.1*   RDW 13.9  --  14.3 14.4   < > 14.0 14.0 14.1     --  114* 94*   < > 47* 24* 28*   MPV 11.4*  --  12.8* 12.7*  --   --   --   --     < > = values in this interval not displayed.       Recent Labs   Lab 03/09/24  0129 03/09/24  0410 03/09/24  0844 03/09/24  1804 03/10/24  0547 03/11/24  0422 03/12/24  0353   *  --    < > 125* 127* 130* 135*   K 5.2*  --    < > 3.8 3.9 3.5 2.7*   CO2 11*  --    < > 17* 20* 26 31*   BUN 67.5*  --    < > 71.0* 77.5* 75.1* 62.1*   CREATININE 3.82*  --    < > 3.00* 2.64* 2.08* 1.51*   CALCIUM 7.9*  --    < > 6.9* 6.2* 6.8* 7.2*   PH  --  7.420  --   --   --   --   --    MG 2.60  --   --  2.00  --   --  2.30   ALBUMIN 2.7*  --    < > 1.9* 1.8* 1.6* 1.5*   ALKPHOS 80  --    < > 54 45 50 87   *  --    < > 654* 586* 410* 283*   *  --    < > 1,140* 863* 494* 313*   BILITOT 1.4  --    < > 1.3 1.6* 1.5 1.5    < > = values in this interval not displayed.       Microbiology Results (last 7 days)       Procedure Component Value Units Date/Time    Blood Culture [[id number]] Collected: 03/12/24 0348    Order Status: Resulted Specimen: Blood from Antecubital, Left Updated: 03/12/24 0358    Blood Culture [[id number]] Collected: 03/12/24 0353    Order Status: Resulted Specimen: Blood from Hand, Left Updated: 03/12/24 0358    Blood culture #1 **CANNOT BE ORDERED STAT**  [[id number]]  (Abnormal)  (Susceptibility) Collected: 03/09/24 0129    Order Status: Completed Specimen: Blood Updated: 03/11/24 1207     CULTURE, BLOOD ([facility]) Streptococcus dysgalactiae ssp equisimilis     GRAM STAIN 1 of 1 Aerobic bottle positive      Gram Positive Cocci, probable Streptococcus      Seen in gram stain of broth only    Blood culture #2 **CANNOT BE ORDERED STAT** [[id number]]  (Abnormal)  (Susceptibility) Collected: 03/09/24 0129    Order Status: Completed Specimen: Blood Updated: 03/11/24 1152     CULTURE, BLOOD ([facility]) Streptococcus dysgalactiae ssp equisimilis     GRAM STAIN 2 of 2 bottles positive      Gram Positive Cocci, probable Streptococcus      Seen in gram stain of broth only    Urine culture [[id number]] Collected: 03/09/24 0217    Order Status: Completed Specimen: Urine Updated: 03/11/24 0916     Urine Culture No Growth    Stool Culture [[id number]]     Order Status: Sent Specimen: Stool     BCID2 Panel [[id number]]  (Abnormal) Collected: 03/09/24 0129    Order Status: Completed Specimen: Blood Updated: 03/09/24 1705     CTX-M (ESBL ) N/A     IMP (Cabapenemase ) N/A     KPC resistance gene (Carbapenemase ) N/A     mcr-1 N/A     mecA ID N/A     Comment: Note: Antimicrobial resistance can occur via multiple mechanisms. A Not Detected result for antimicrobial resistance gene(s) does not indicate antimicrobial susceptibility. Subculturing is required for species identification and susceptibility testing of   isolates.        mecA/C and MREJ (MRSA) gene N/A     NDM (Carbapenemase ) N/A     OXA-48-like (Carbapenemase ) N/A     Isela/B (VRE gene) N/A     VIM (Carbapenemase ) N/A     Enterococcus faecalis Not Detected     Enterococcus faecium Not Detected     Listeria monocytogenes Not Detected     Staphylococcus spp. Not Detected     Staphylococcus aureus Not Detected     Staphylococcus epidermidis Not Detected     Staphylococcus lugdunensis Not  Detected     Streptococcus spp. Detected     Streptococcus agalactiae (Group B) Not Detected     Streptococcus pneumoniae Not Detected     Streptococcus pyogenes (Group A) Not Detected     Acinetobacter calcoaceticus/baumannii complex Not Detected     Bacteroides fragilis Not Detected     Enterobacterales Not Detected     Enterobacter cloacae complex Not Detected     Escherichia coli Not Detected     Klebsiella aerogenes Not Detected     Klebsiella oxytoca Not Detected     Klebsiella pneumoniae group Not Detected     Proteus spp. Not Detected     Salmonella spp. Not Detected     Serratia marcescens Not Detected     Haemophilus influenzae Not Detected     Neisseria meningitidis Not Detected     Pseudomonas aeruginosa Not Detected     Stenotrophomonas maltophilia Not Detected     Candida albicans Not Detected     Candida auris Not Detected     Candida glabrata Not Detected     Candida krusei Not Detected     Candida parapsilosis Not Detected     Candida tropicalis Not Detected     Cryptococcus neoformans/gattii Not Detected    Narrative:      The [employer] BCID2 Panel is a multiplexed nucleic acid test intended for the use with Panl® 2.0 or Panl® [employer] for the simultaneous qualitative detection and identification of multiple bacterial and yeast nucleic acids and select genetic determinants associated with antimicrobial resistance.  The [employer] BCID2 Panel test is performed directly on blood culture samples identified as positive by a continuous monitoring blood culture system.  Results are intended to be interpreted in conjunction with Gram stain results.             See below for Radiology    Scheduled Med:   folic acid  1 mg Oral Daily    nystatin  500,000 Units Oral QID    piperacillin-tazobactam (Zosyn) IV (PEDS and ADULTS) (extended infusion is not appropriate)  4.5 g Intravenous Q8H    potassium chloride  40 mEq Intravenous BID      Continuous Infusions:   dextrose 5 % and 0.9 %  NaCl 125 mL/hr at 03/11/24 1615      PRN Meds:  acetaminophen, aluminum-magnesium hydroxide-simethicone, glucagon (human recombinant), levalbuterol, melatonin, ondansetron, polyethylene glycol, prochlorperazine, senna-docusate 8.6-50 mg, sodium chloride 0.9%     Assessment/Plan:  Severe sepsis secondary to Streptococcus dysgalactiae ssp equisimilis   - zosyn  - blood cx's repeated 3-12-24    Acute hypoxemic respiratory failure  - resolved     Possible partial bowel obstruction secondary to SMA syndrome by ct abd  - seen by surgical hospitalist- no intervention   - keep npo secondary to fluid filled stomach  - get kub- nl gas pattern  - gi pcr  - will do ct abd and pelvis w iv contrast     OKSANA   - improving   - no obstruction seen on non contrast ct abdomen   - retroperitoneal US- no renal abnormalities     Lactic acidosis  - improving      Rhabdomyolysis  -  dc continue sodium bicarbonate drip   -improving 3900    Transaminitis  -improving    Leukocytosis  - worsening        Thrombocytopenia  -improving / prob related to sepsis but will see what heme has to say- appreciate assistance   -  lovenox discontinued   -peripheral smear w small clumps/folic acid 9/ retic count low     Hyponatremia/Hypochloremia   - much improved     Hyperkalemia>>>hypokalemia  - replaced     Intractable hypoglycemia  - post glucagon  - fluids change to D5 1/2 nss   - continue to monitor    Abd pain   -initially seen by surgical hospitalist  -now npo  - lets get ct abd /pelvis w iv contrast and continue hydration       Hypocalcemia after correction   - calcium gluconate x one 3/10/24  - corrected       Polysubstance abuse-UDS positive for amphetamine, fentanyl, cannabinoids  Tobacco abuse   Chronic back pain  4 mm ground-glass left upper lobe nodule     Plan:    Start D5 nss at 125 cc  ID consult  Heme consult for profound thrombocytopenia/ no active bleeding / low retic count  Folic acid 1 mg  npo  Gi pcr for diarrhea  Am labs   Continue to  Hold all heparin / lovenox  Blood cx's repated today  Ct abd and pelvis w iv contrast    Replace hypiokalemia  Am labs   Wound care nurse consulted       Cc time 45 minutes   Cc dx- severe sepsis       VTE prophylaxis:     Patient condition:  Guarded    Anticipated discharge and Disposition:   tbd      All diagnosis and differential diagnosis have been reviewed; assessment and plan has been documented; I have personally reviewed the labs and test results that are presently available; I have reviewed the patients medication list; I have reviewed the consulting providers response and recommendations. I have reviewed or attempted to review medical records based upon their availability    All of the patient's questions have been  addressed and answered. Patient's is agreeable to the above stated plan. I will continue to monitor closely and make adjustments to medical management as needed.  _____________________________________________________________________    Nutrition Status:    Radiology:  I have personally reviewed the following imaging and agree with the radiologist.     MRI Thoracic Spine Without Contrast  Narrative: EXAMINATION:  MRI THORACIC SPINE WITHOUT CONTRAST    CLINICAL HISTORY:  Mid-back pain;    TECHNIQUE:  Multiplanar multisequence MR images of the thoracic spine are obtained without contrast.    COMPARISON:  CT chest, abdomen and pelvis dated 03/09/2024    FINDINGS:  There is exaggerated kyphosis centered at T11-T12.  There are postoperative changes with prior decompressive laminectomies at T11-T12.  The vertebral heights are maintained.  The bone marrow is normal in signal.    There are T2 signal changes in the lower thoracic cord at T11-T12, with associated volume loss, likely chronic.  There is no epidural fluid collection.    There are multilevel degenerative changes with marginal osteophytes and facet arthropathy.  There is mild canal narrowing at T11-T12 secondary to calcified disc bulge.  There  are foraminal narrowing is most evident on the right at T9-T12 and on the left at T10-T12.    The paraspinal soft tissues are unremarkable.  Impression: Postoperative changes at T11-T12 with chronic appearing cord signal changes and mild canal narrowing.    Electronically signed by: Aye Nath  Date:    03/11/2024  Time:    18:52  MRI Lumbar Spine Without Contrast  Narrative: EXAMINATION:  MRI LUMBAR SPINE WITHOUT CONTRAST    CLINICAL HISTORY:  Low back pain, progressive neurologic deficit;    TECHNIQUE:  Multiplanar multisequence MR images of the lumbar spine are obtained without contrast.    COMPARISON:  CT chest, abdomen and pelvis dated 03/09/2024    FINDINGS:  Evaluation is limited secondary to motion artifact.  There are 5 non-rib-bearing lumbar type vertebral bodies with a transitional type S1 vertebral body.  There is a mild rightward curvature of the lumbar spine with grade 1 retrolisthesis L2 over L3.  There is L2-L3 disc edema with adjacent endplate sclerosis.  There is no definite bone marrow edema.  The vertebral heights are preserved.    The conus terminates at the level of L1-L2.  It is not well visualized appears normal in contour.    Disc spaces, spinal canal and neural foramina are as follows:    L1-L2: Disc bulge and facet hypertrophy with moderate narrowing of the spinal canal.  Mild-to-moderate bilateral foraminal stenosis.    L2-L3: Grade 1 retrolisthesis of L2 over L3 and facet hypertrophy with moderate narrowing of the spinal canal.  Moderate right and severe left neural foraminal stenosis.    L3-L4: Disc bulge and facet hypertrophy with moderate narrowing of the spinal canal.  Moderate bilateral foraminal stenosis.    L4-L5: Disc bulge and facet hypertrophy with mild narrowing of the spinal canal.  Moderate right and mild left neural foraminal stenosis.    L5-S1: Disc bulge and facet hypertrophy.  No significant spinal canal stenosis.  Mild bilateral neural foraminal stenosis.    There  is mild edema in the right posterior paraspinal soft tissues at L3-L4.  Impression: 1. Limited evaluation.  2. Transitional lumbosacral anatomy.  3. Suspected moderate canal stenosis at L1-L4.  4. Multilevel neural foraminal stenoses as described.  5. L2-L3 disc edema with adjacent endplate sclerosis, favored to be degenerative.    Electronically signed by: Aye Nath  Date:    03/11/2024  Time:    18:49  MRI Brain Without Contrast  Narrative: EXAMINATION:  MRI BRAIN WITHOUT CONTRAST    CLINICAL HISTORY:  Dizziness, non-specific;    TECHNIQUE:  Multiplanar multisequence MR imaging of the brain was performed without contrast.    COMPARISON:  03/09/2024    FINDINGS:  No intracranial mass or lesion is seen.  No hemorrhage is seen.  No acute infarct is seen.  No diffusion abnormality seen.  No parenchymal abnormality is seen.  Gyri and sulci appear normal.  Ventricles and basilar cisterns appear grossly unremarkable.  No abnormal white matter changes are seen..  Posterior fossa appears normal.  Calvarium is intact.  Paranasal sinuses appear grossly unremarkable.  Impression: No abnormality seen    Electronically signed by: Moncho Oneill  Date:    03/11/2024  Time:    18:43  Echo    The valves were not seen well because of subcostal views only, however   there appears to be no regurgitation by color flow doppler.    No pericardial effusion.    Consider FREEMAN for evaluation.       Oniel Overton MD  Department of Hospital Medicine   [facility]   03/12/2024

## 2024-03-12 NOTE — CONSULTS
Hospitalist Discharge Summary     Patient ID:  Gavi Flores  [id number]  17 y.o.  1968    PCP on record: Blanca Malik NP    Admit date: 2/18/2019  Discharge date and time: 2/20/2019      DISCHARGE DIAGNOSIS:  See below      CONSULTATIONS:  None    Excerpted HPI from H&P of Ursula Clark MD:  Walt Piñach is a 52 y.o. female with PMHx significant for asthma, HTN, DM, obesity presented to ER with above complaints. Pt says she has been having symptoms for 4 days but since yesterday they have worsened with chest tightness, wheezing and progressive SOB. She is a bis  and says when she is exposed to some odors in bus, her symptoms are worse. She tried her albuterol at home with no benefit. Denies recent sickness,, travel or sick exposure. In ER she is afebrile, normal labs, sats >95% on RA. cxr neg. She was noticed to have decreased air entry and wheezing, multiple neb treatment given with minimal benefit.    _____________________________________________________________________  DISCHARGE SUMMARY/HOSPITAL COURSE:  for full details see H&P, daily progress notes, labs, consult notes. Mild persistent asthma with exacerbation - improved   -med/obs  Switch to oral steroids, duo nebs  -peak flow  -cont home steroid inhaler (substitution by pharmacy per protocol)  -monitor sats    2/20:  Pt saturating well on RA, tolerating PO, no wheezing appreciated on PE. Pt stable to be discharged home. Updated  at bedside.     HTN  -Resume home meds     DM, steroid induced Hyperglycemia - improved  Resume home meds     Hypothyroidism  -resume levothyroxine     HOLLY  -QHS CPAP       _______________________________________________________________________  Patient seen and examined by me on discharge day. Pertinent Findings:  Gen:    Not in distress  Chest: Clear lungs, no wheezing, on RA  CVS:   Regular rhythm.   No edema  Abd:  Soft, not distended, not tender  Neuro:  Alert, oriented [facility]  Hematology/Oncology  Consult Note    Patient Name: Sebastian Davis  MRN: [id number]  Admission Date: 3/9/2024  Hospital Length of Stay: 3 days  Attending Provider: Oniel Overton MD  Consulting Provider: Elizabeth K Lejeune, MD  Principal Problem:<principal problem not specified>    Inpatient consult to Hematology  Consult performed by: Lejeune, Elizabeth Kennedy, MD  Consult ordered by: Oniel Overton MD        Subjective:     HPI:   55yo M presented on 3/9 with weakness and stroke like symptoms. SIRS criteria + on admission and positive blood cultures for strep dysgalactiae from ER visit 2 days prior. He was admitted for sepsis with bacteremia source. Was initially placed on vancomycin and zosyn and now is on zosyn. Hematology has been consulted for thrombocytopenia.     Upon admission his platelet count was 114K and has steadily dropped to 28K as of today, was 24K yesterday. Some mild platelet clumping appreciated on peripheral smear. No evidence of schistocytes. Hgb with mild decrease likely dilutional. Haptoglobin normal.     This morning he has no complaints. Frequent coughing up of mucus.     Oncology Treatment Plan:   [No matching plan found]    Medications:  Continuous Infusions:   dextrose 5 % and 0.9 % NaCl 125 mL/hr at 03/11/24 1615     Scheduled Meds:   folic acid  1 mg Oral Daily    nystatin  500,000 Units Oral QID    piperacillin-tazobactam (Zosyn) IV (PEDS and ADULTS) (extended infusion is not appropriate)  4.5 g Intravenous Q8H    potassium chloride  40 mEq Intravenous BID     PRN Meds:acetaminophen, aluminum-magnesium hydroxide-simethicone, glucagon (human recombinant), HYDROmorphone, levalbuterol, melatonin, ondansetron, polyethylene glycol, prochlorperazine, senna-docusate 8.6-50 mg, sodium chloride 0.9%     Review of patient's allergies indicates:  No Known Allergies     Past Medical History:   Diagnosis Date    Basal cell carcinoma     Nose    Cord  compression     Herniated disc, cervical     Thoracic spinal stenosis      Past Surgical History:   Procedure Laterality Date    BACK SURGERY      LAMINECTOMY      T10-T12     Family History       Problem Relation (Age of Onset)    Arthritis Mother    COPD Mother    Cancer Mother, Father          Tobacco Use    Smoking status: Every Day     Current packs/day: 1.00     Types: Cigarettes    Smokeless tobacco: Never   Substance and Sexual Activity    Alcohol use: Yes     Alcohol/week: 3.0 standard drinks of alcohol     Types: 3 Cans of beer per week    Drug use: No    Sexual activity: Yes     Partners: Female       Review of Systems   Constitutional:  Positive for fatigue and fever. Negative for unexpected weight change.   HENT:  Positive for congestion and sore throat.    Eyes:  Negative for visual disturbance.   Respiratory:  Positive for cough and shortness of breath.    Cardiovascular:  Negative for chest pain.   Gastrointestinal:  Positive for abdominal distention, abdominal pain and nausea. Negative for diarrhea and vomiting.   Endocrine: Negative for polyuria.   Genitourinary:  Negative for dysuria.   Musculoskeletal:  Positive for back pain.   Neurological:  Negative for light-headedness and headaches.   Psychiatric/Behavioral:  Negative for confusion.      Objective:     Vital Signs (Most Recent):  Temp: 98.2 °F (36.8 °C) (03/12/24 0759)  Pulse: 98 (03/12/24 0759)  Resp: 18 (03/12/24 0759)  BP: 136/86 (03/12/24 0759)  SpO2: (!) 90 % (03/12/24 0759) Vital Signs (24h Range):  Temp:  [98.1 °F (36.7 °C)-98.2 °F (36.8 °C)] 98.2 °F (36.8 °C)  Pulse:  [] 98  Resp:  [18-22] 18  SpO2:  [88 %-94 %] 90 %  BP: (109-136)/(79-86) 136/86     Weight: 81.6 kg (180 lb)  Body mass index is 25.83 kg/m².  Body surface area is 2.01 meters squared.      Intake/Output Summary (Last 24 hours) at 3/12/2024 0918  Last data filed at 3/12/2024 0551  Gross per 24 hour   Intake 180 ml   Output 700 ml   Net -520 ml       Physical  x3  _______________________________________________________________________  DISCHARGE MEDICATIONS:   Current Discharge Medication List      START taking these medications    Details   predniSONE (DELTASONE) 20 mg tablet Take 40 mg by mouth daily (with breakfast). Qty: 6 Tab, Refills: 0      albuterol (ACCUNEB) 1.25 mg/3 mL nebu Take 6 mL by inhalation every four (4) hours as needed (wheezing). Qty: 25 Each, Refills: 0      methocarbamol (ROBAXIN) 500 mg tablet Take 1 Tab by mouth four (4) times daily. Qty: 20 Tab, Refills: 0      naproxen (NAPROSYN) 500 mg tablet Take 1 Tab by mouth every twelve (12) hours as needed for Pain. Qty: 20 Tab, Refills: 0         CONTINUE these medications which have NOT CHANGED    Details   metFORMIN (GLUCOPHAGE) 500 mg tablet Take 1,000 mg by mouth two (2) times daily (with meals). esomeprazole (NEXIUM) 40 mg capsule Take 40 mg by mouth daily. glipiZIDE (GLUCOTROL) 5 mg tablet Take 5 mg by mouth two (2) times a day. levothyroxine (SYNTHROID) 25 mcg tablet Take 25 mcg by mouth Daily (before breakfast). amitriptyline (ELAVIL) 25 mg tablet Take 25 mg by mouth nightly. fluticasone-salmeterol (ADVAIR DISKUS) 250-50 mcg/dose diskus inhaler Take 1 Puff by inhalation two (2) times a day. albuterol (VENTOLIN HFA) 90 mcg/actuation inhaler Take 2 Puffs by inhalation every four (4) hours as needed for Wheezing. hydrocodone-acetaminophen (NORCO) 5-325 mg per tablet Take 1 tablet by mouth every four (4) hours as needed for Pain. Qty: 15 tablet, Refills: 0             My Recommended Diet, Activity, Wound Care, and follow-up labs are listed in the patient's Discharge Insturctions which I have personally completed and reviewed.     ______________________________________________________________________    Risk of deterioration: Low    Condition at Discharge:  Stable  ______________________________________________________________________    Disposition  Home with Exam  Constitutional:       General: He is not in acute distress.     Appearance: Normal appearance. He is ill-appearing.   HENT:      Head: Normocephalic and atraumatic.      Nose: Nose normal.      Mouth/Throat:      Mouth: Mucous membranes are moist.      Pharynx: Oropharynx is clear.   Eyes:      Extraocular Movements: Extraocular movements intact.      Conjunctiva/sclera: Conjunctivae normal.      Pupils: Pupils are equal, round, and reactive to light.   Cardiovascular:      Rate and Rhythm: Normal rate and regular rhythm.      Pulses: Normal pulses.      Heart sounds: Normal heart sounds. No murmur heard.  Pulmonary:      Effort: Pulmonary effort is normal. No respiratory distress.      Breath sounds: Wheezing present.   Abdominal:      General: There is no distension.      Palpations: Abdomen is soft.      Tenderness: There is no abdominal tenderness.   Musculoskeletal:         General: Normal range of motion.      Cervical back: Normal range of motion and neck supple.      Right lower leg: No edema.      Left lower leg: No edema.   Lymphadenopathy:      Cervical: No cervical adenopathy.   Skin:     General: Skin is warm and dry.   Neurological:      General: No focal deficit present.      Mental Status: He is alert and oriented to person, place, and time.         Significant Labs:   CBC:   Recent Labs   Lab 03/11/24  0833 03/12/24  0353   WBC 14.75  14.75* 19.56  19.56*   HGB 12.8* 11.5*   HCT 34.8* 31.9*   PLT 24* 28*    and CMP:   Recent Labs   Lab 03/11/24  0422 03/12/24  0353   * 135*   K 3.5 2.7*   CO2 26 31*   BUN 75.1* 62.1*   CREATININE 2.08* 1.51*   CALCIUM 6.8* 7.2*   ALBUMIN 1.6* 1.5*   BILITOT 1.5 1.5   ALKPHOS 50 87   * 313*   * 283*       Diagnostic Results:  Reviewed images personally in EPIC    Assessment/Plan:     Thrombocytopenia - Severe, currently stabilizing in the 20's. This is most consistent with marrow suppression in the setting of sepsis and zosyn.     - Hold  family, no needs  ______________________________________________________________________    Care Plan discussed with:   Patient, Family, RN, Care Manager    ______________________________________________________________________    Code Status: Full Code  ______________________________________________________________________      Follow up with:   PCP : Mortimer Manly, NP  Follow-up Information     Follow up With Specialties Details Why Contact Info    Mortimer Manly, NP Nurse Practitioner Schedule an appointment as soon as possible for a visit  [address] [place] [address] Way  [phone]                Total time in minutes spent coordinating this discharge (includes going over instructions, follow-up, prescriptions, and preparing report for sign off to her PCP) :  35 minutes    Signed:  Araceli Patrick MD anticoagulation until platelets are >50,000  - Transfuse 1u Platelets if <10,000 or bleeding  - Should improve with time/clinical improvement  - Recommend SCDs for VTE prevention in the interim    Thank you for your consult.     Elizabeth K Lejeune, MD  Hematology/Oncology  [facility]

## 2024-03-12 NOTE — CONSULTS
Infectious Diseases Consultation       Inpatient consult to Infectious Diseases  Consult performed by: Rohan Troncoso MD  Consult ordered by: Oniel Overton MD      HPI:   54-year-old male with past medical history of basal cell carcinoma, polysubstance abuse and chronic back pain , seen at the ED at this same facility, [facility], on 03/06/2024, diagnosed with a pneumonia and discharged on Augmentin and azithromycin, is admitted this time on 03/09/2024, presenting via EMS to the ED with complaints of generalized weakness and numbness to fingers and toes, slurred speech and numbness to left side.  On presentation he was noted to have no fevers and no leukocytosis, thrombocytopenic, anemic, elevated AST of 120, , abnormal renal function with creatinine of 3.0.  CPK 15,967 and lactate 5.9.  Urine toxicology test positive for amphetamines, fentanyl and cannabis.  Hepatitis-C positive.  Blood cultures with Streptococcus dysgalactiae in 2 of 2 sets and a repeat blood culture from today 3/12 pending.  Urinalysis abnormal with 21-50 WBC, moderate bacteria but negative LE.  Review of his records show a 3/6 blood cultures from recent ED visit with Streptococcus dysgalactiae in 2 of 2 sets.  2D echocardiogram with valves not seen well.  Chest x-ray with no acute cardiopulmonary disease.  CT scan of the chest abdomen and pelvis with no acute abnormality identified in the chest, emphysematous changes present, urinary bladder wall thickening may reflect under distention, cystitis of chronic hypertrophic changes related to prostatomegaly, moderate colonic stool with ample gas.  Bilateral hand x-ray CT scan of C-spine, with no acute changes.  CT head with no acute abnormalities.  MRI of the brain and C-spine with no acute changes.  MRI of the lumbar spine with suspected moderate canal stenosis at L1-L4, multilevel neural foraminal stenosis, L2-L3 disc edema with adjacent endplate  sclerosis favored to be degenerative.  Repeat CT scan of the abdomen and pelvis done today 3/13 with findings of partial small bowel obstruction, colonic ileus, large volume ascites, anasarca, edema, bibasilar atelectasis and bilateral pleural effusions.  He is on antibiotic coverage with Zosyn, was on vancomycin which has been discontinued.    Past Medical and Surgical History  Allergies :   Patient has no known allergies.    Medical :   He has a past medical history of Basal cell carcinoma, Cord compression, Herniated disc, cervical, and Thoracic spinal stenosis.    Surgical :   He has a past surgical history that includes Back surgery and Laminectomy.     Family History  His family history includes Arthritis in his mother; COPD in his mother; Cancer in his father and mother.    Social History  He reports that he has been smoking cigarettes. He has never used smokeless tobacco. He reports current alcohol use of about 3.0 standard drinks of alcohol per week. He reports that he does not use drugs.     Review of Systems   Constitutional:  Positive for malaise/fatigue.   HENT: Negative.     Respiratory: Negative.     Gastrointestinal:  Positive for abdominal pain.   Genitourinary: Negative.    Musculoskeletal:  Positive for back pain.   Neurological:  Positive for weakness.   Endo/Heme/Allergies: Negative.    Psychiatric/Behavioral: Negative.     All other Systems review done and negative.    Objective   Physical Exam  Vitals reviewed.   Constitutional:       General: He is not in acute distress.     Comments: Lying in bed.  Wife and stepson at the bedside   HENT:      Head: Normocephalic and atraumatic.      Mouth/Throat:      Comments: Poor dentition  Eyes:      Pupils: Pupils are equal, round, and reactive to light.   Cardiovascular:      Rate and Rhythm: Normal rate and regular rhythm.      Heart sounds: Normal heart sounds.   Pulmonary:      Effort: Pulmonary effort is normal. No respiratory distress.      Breath  "sounds: Normal breath sounds.   Abdominal:      General: Bowel sounds are normal. There is distension.      Palpations: Abdomen is soft.      Tenderness: There is abdominal tenderness.   Genitourinary:     Comments: No lesions noted  Musculoskeletal:         General: No deformity.      Cervical back: Neck supple.      Right lower leg: Edema present.      Left lower leg: Edema present.   Skin:     Findings: No rash.      Comments: Left anterior leg blister with some swelling and erythema.  Coccygeal stage I pressure injury   Neurological:      Mental Status: He is alert and oriented to person, place, and time.   Psychiatric:      Comments: Calm and cooperative       VITAL SIGNS: 24 HR MIN & MAX LAST    Temp  Min: 97.9 °F (36.6 °C)  Max: 98.2 °F (36.8 °C)  97.9 °F (36.6 °C)        BP  Min: 109/79  Max: 136/86  116/67     Pulse  Min: 98  Max: 107  101     Resp  Min: 18  Max: 22  18    SpO2  Min: 88 %  Max: 94 %  (!) 93 %      HT: 5' 10" (177.8 cm)  WT: 81.6 kg (180 lb)  BMI: 25.8     Recent Results (from the past 24 hour(s))   Haptoglobin    Collection Time: 03/11/24  2:49 PM   Result Value Ref Range    Haptoglobin 152 14 - 258 mg/dL   Direct antiglobulin test    Collection Time: 03/11/24  2:49 PM   Result Value Ref Range    Direct Maribel (THI) NEG    Comprehensive Metabolic Panel    Collection Time: 03/12/24  3:53 AM   Result Value Ref Range    Sodium Level 135 (L) 136 - 145 mmol/L    Potassium Level 2.7 (LL) 3.5 - 5.1 mmol/L    Chloride 91 (L) 98 - 107 mmol/L    Carbon Dioxide 31 (H) 22 - 29 mmol/L    Glucose Level 118 (H) 74 - 100 mg/dL    Blood Urea Nitrogen 62.1 (H) 8.4 - 25.7 mg/dL    Creatinine 1.51 (H) 0.73 - 1.18 mg/dL    Calcium Level Total 7.2 (L) 8.4 - 10.2 mg/dL    Protein Total 4.4 (L) 6.4 - 8.3 gm/dL    Albumin Level 1.5 (L) 3.5 - 5.0 g/dL    Globulin 2.9 2.4 - 3.5 gm/dL    Albumin/Globulin Ratio 0.5 (L) 1.1 - 2.0 ratio    Bilirubin Total 1.5 <=1.5 mg/dL    Alkaline Phosphatase 87 40 - 150 unit/L    " Alanine Aminotransferase 283 (H) 0 - 55 unit/L    Aspartate Aminotransferase 313 (H) 5 - 34 unit/L    eGFR 55 mls/min/1.73/m2   Magnesium    Collection Time: 03/12/24  3:53 AM   Result Value Ref Range    Magnesium Level 2.30 1.60 - 2.60 mg/dL   Lactic Acid, Plasma    Collection Time: 03/12/24  3:53 AM   Result Value Ref Range    Lactic Acid Level 2.6 (H) 0.5 - 2.2 mmol/L   CBC with Differential    Collection Time: 03/12/24  3:53 AM   Result Value Ref Range    WBC 19.56 (H) 4.50 - 11.50 x10(3)/mcL    RBC 3.80 (L) 4.70 - 6.10 x10(6)/mcL    Hgb 11.5 (L) 14.0 - 18.0 g/dL    Hct 31.9 (L) 42.0 - 52.0 %    MCV 83.9 80.0 - 94.0 fL    MCH 30.3 27.0 - 31.0 pg    MCHC 36.1 (H) 33.0 - 36.0 g/dL    RDW 14.1 11.5 - 17.0 %    Platelet 28 (LL) 130 - 400 x10(3)/mcL    MPV      NRBC% 0.0 %    IPF 19.9 (H) 0.9 - 11.2 %   Manual Differential    Collection Time: 03/12/24  3:53 AM   Result Value Ref Range    WBC 19.56 x10(3)/mcL    Neutrophils % 97 %    Lymphs % 2 %    Monocytes % 1 %    Neutrophils Abs 18.9732 (H) 2.1 - 9.2 x10(3)/mcL    Lymphs Abs 0.3912 (L) 0.6 - 4.6 x10(3)/mcL    Monocytes Abs 0.1956 0.1 - 1.3 x10(3)/mcL    Platelets Decreased (A) Normal, Adequate    RBC Morph Normal Normal   CK    Collection Time: 03/12/24  3:53 AM   Result Value Ref Range    Creatine Kinase 1,327 (H) 30 - 200 U/L       Imaging  Imaging Results              MRI Thoracic Spine Without Contrast (Final result)  Result time 03/11/24 18:52:52      Final result by Aye Nath MD (03/11/24 18:52:52)                   Impression:      Postoperative changes at T11-T12 with chronic appearing cord signal changes and mild canal narrowing.      Electronically signed by: Aye Nath  Date:    03/11/2024  Time:    18:52               Narrative:    EXAMINATION:  MRI THORACIC SPINE WITHOUT CONTRAST    CLINICAL HISTORY:  Mid-back pain;    TECHNIQUE:  Multiplanar multisequence MR images of the thoracic spine are obtained without  contrast.    COMPARISON:  CT chest, abdomen and pelvis dated 03/09/2024    FINDINGS:  There is exaggerated kyphosis centered at T11-T12.  There are postoperative changes with prior decompressive laminectomies at T11-T12.  The vertebral heights are maintained.  The bone marrow is normal in signal.    There are T2 signal changes in the lower thoracic cord at T11-T12, with associated volume loss, likely chronic.  There is no epidural fluid collection.    There are multilevel degenerative changes with marginal osteophytes and facet arthropathy.  There is mild canal narrowing at T11-T12 secondary to calcified disc bulge.  There are foraminal narrowing is most evident on the right at T9-T12 and on the left at T10-T12.    The paraspinal soft tissues are unremarkable.                                       MRI Lumbar Spine Without Contrast (Final result)  Result time 03/11/24 18:49:52      Final result by Aye Nath MD (03/11/24 18:49:52)                   Impression:      1. Limited evaluation.  2. Transitional lumbosacral anatomy.  3. Suspected moderate canal stenosis at L1-L4.  4. Multilevel neural foraminal stenoses as described.  5. L2-L3 disc edema with adjacent endplate sclerosis, favored to be degenerative.      Electronically signed by: Aye Nath  Date:    03/11/2024  Time:    18:49               Narrative:    EXAMINATION:  MRI LUMBAR SPINE WITHOUT CONTRAST    CLINICAL HISTORY:  Low back pain, progressive neurologic deficit;    TECHNIQUE:  Multiplanar multisequence MR images of the lumbar spine are obtained without contrast.    COMPARISON:  CT chest, abdomen and pelvis dated 03/09/2024    FINDINGS:  Evaluation is limited secondary to motion artifact.  There are 5 non-rib-bearing lumbar type vertebral bodies with a transitional type S1 vertebral body.  There is a mild rightward curvature of the lumbar spine with grade 1 retrolisthesis L2 over L3.  There is L2-L3 disc edema with adjacent endplate  sclerosis.  There is no definite bone marrow edema.  The vertebral heights are preserved.    The conus terminates at the level of L1-L2.  It is not well visualized appears normal in contour.    Disc spaces, spinal canal and neural foramina are as follows:    L1-L2: Disc bulge and facet hypertrophy with moderate narrowing of the spinal canal.  Mild-to-moderate bilateral foraminal stenosis.    L2-L3: Grade 1 retrolisthesis of L2 over L3 and facet hypertrophy with moderate narrowing of the spinal canal.  Moderate right and severe left neural foraminal stenosis.    L3-L4: Disc bulge and facet hypertrophy with moderate narrowing of the spinal canal.  Moderate bilateral foraminal stenosis.    L4-L5: Disc bulge and facet hypertrophy with mild narrowing of the spinal canal.  Moderate right and mild left neural foraminal stenosis.    L5-S1: Disc bulge and facet hypertrophy.  No significant spinal canal stenosis.  Mild bilateral neural foraminal stenosis.    There is mild edema in the right posterior paraspinal soft tissues at L3-L4.                                       MRI Brain Without Contrast (Final result)  Result time 03/11/24 18:43:38      Final result by Gisselle Oneill MD (03/11/24 18:43:38)                   Impression:      No abnormality seen      Electronically signed by: Moncho Oneill  Date:    03/11/2024  Time:    18:43               Narrative:    EXAMINATION:  MRI BRAIN WITHOUT CONTRAST    CLINICAL HISTORY:  Dizziness, non-specific;    TECHNIQUE:  Multiplanar multisequence MR imaging of the brain was performed without contrast.    COMPARISON:  03/09/2024    FINDINGS:  No intracranial mass or lesion is seen.  No hemorrhage is seen.  No acute infarct is seen.  No diffusion abnormality seen.  No parenchymal abnormality is seen.  Gyri and sulci appear normal.  Ventricles and basilar cisterns appear grossly unremarkable.  No abnormal white matter changes are seen..  Posterior fossa appears normal.   Calvarium is intact.  Paranasal sinuses appear grossly unremarkable.                                       CT Cervical Spine Without Contrast (Final result)  Result time 03/09/24 10:26:23      Final result by Natanael Pereira MD (03/09/24 10:26:23)                   Narrative:    EXAMINATION  CT CERVICAL SPINE WITHOUT CONTRAST    CLINICAL HISTORY  Neck pain, chronic;    TECHNIQUE  Non-contrast helical-acquisition CT images of the cervical spine were obtained and multiplanar reformats accomplished by a CT technologist at a separate workstation, pushed to PACS for physician review.    COMPARISON  22 December 2022    FINDINGS  Images were reviewed in bone, soft tissue, and lung windows.    Exam quality: adequate for evaluation    Visualized levels: Skull base through T2 upper endplate.    Vertebral segments: No displaced fracture visualized. No acute compression deformity or focal vertebral body abnormality. The C1 lateral masses are symmetrically aligned on C2.  No evidence of traumatic subluxation. There are similar degenerative endplate changes and associated osteophytic projections, as well as multilevel bilateral facet arthrosis.  Chronic/degenerative grade 1 anterolisthesis of C3 on C4 and C7 on T1 also unchanged.    Disc spaces: Multilevel intervertebral narrowing is present. No acute process identified.    Curvature: No grossly abnormal curvature appreciated.    Paravertebral tissue/musculature: No thickening or focal contour irregularity. The paraspinal musculature and overlying subcutaneous tissues demonstrate no acute or focal lesion.    Other findings: The visualized thyroid tissue is unremarkable. Scattered vascular calcifications are present. The included upper lung zones and mediastinal vasculature are without focal abnormality. No acute or suspicious focal abnormality of the included brain and skull base.    IMPRESSION  1. No evidence of acute osseous displacement or traumatic malalignment.  2.  Degenerative changes and other nonacute secondary details discussed above, similar in the interval.  ==========    Please note ligament, spinal cord, and/or vascular abnormalities cannot be excluded on the basis of this examination.  Additional imaging recommended if there is elevated clinical concern for high-grade soft tissue injury.    RADIATION DOSE  Automated tube current modulation, weight-based exposure dosing, and/or iterative reconstruction technique utilized to reach lowest reasonably achievable exposure rate.    DLP: 407 mGy*cm      Electronically signed by: Natanael Pereira  Date:    03/09/2024  Time:    10:26                                     X-Ray Hand 3 view Right (Final result)  Result time 03/09/24 10:28:23      Final result by Chevy Santillan MD (03/09/24 10:28:23)                   Impression:      No displaced fracture      Electronically signed by: Chevy Santillan MD  Date:    03/09/2024  Time:    10:28               Narrative:    EXAMINATION:  Three views right hand    CLINICAL HISTORY:  Fall    COMPARISON:  06/09/2015    FINDINGS:  No displaced fracture or dislocation identified.  No radiopaque foreign body.                                       X-Ray Hand 3 view Left (Final result)  Result time 03/09/24 10:29:29   Procedure changed from X-Ray Hand 2 View Bilat     Final result by Chevy Santillan MD (03/09/24 10:29:29)                   Impression:      No acute osseous findings      Electronically signed by: Chevy Santillan MD  Date:    03/09/2024  Time:    10:29               Narrative:    EXAMINATION:  Three views left hand    CLINICAL HISTORY:  Fall    COMPARISON:  01/01/2021    FINDINGS:  Punctate radiodense foreign body near the little finger distal tuft is unchanged.  No acute fracture or dislocation.                                       CT Chest Abdomen Pelvis Without Contrast (XPD) (Final result)  Result time 03/09/24 10:58:22   Procedure changed from CT Chest Without Contrast     Final  result by Bobby Maguire MD (03/09/24 10:58:22)                   Impression:      1. No acute abnormality identified within the chest.  2. Emphysematous changes are present.  3. Urinary bladder wall thickening may reflect under distension, cystitis, or chronic hypertrophic changes related to prostatomegaly.  4. Fluid-filled distended stomach.  5. Moderate colonic stool with ample colonic gas.  6. [employer] concordance.      Electronically signed by: Bobby Maguire MD  Date:    03/09/2024  Time:    10:58               Narrative:    EXAMINATION:  CT CHEST ABDOMEN PELVIS WITHOUT CONTRAST(XPD)    CLINICAL HISTORY:  Sepsis.    TECHNIQUE:  Axial CT images of the chest were obtained without intravenous contrast.  Coronal and sagittal reformations were obtained.  Axial CT images of the abdomen and pelvis were obtained without intravenous contrast.  Coronal and sagittal reformations were obtained. Automated exposure control was utilized. Total exam DLP is 282 mGy cm.    COMPARISON:  CT chest 09/05/2021, CT abdomen and pelvis 10/22/2022    FINDINGS:  CHEST:    Absence of intravenous contrast limits evaluation.  Atherosclerotic calcifications are noted.  Thoracic aorta is not aneurysmal.  There is trace pericardial fluid.  There is no lymphadenopathy appreciated.  There is no pleural effusion demonstrated.  Centrilobular emphysematous changes are present.  There is a 5 mm ground-glass nodule within the left upper lobe image 34 of series 4.  There is dependent atelectasis.  There is no consolidation or pneumothorax identified.  No acute displaced fractures identified.    ABDOMEN AND PELVIS:    Absence of intravenous contrast limits evaluation.  There is markedly distended stomach which is fluid-filled.  The non contrasted liver, gallbladder, pancreas, spleen, and adrenal glands appear grossly unremarkable.  Kidneys demonstrate no hydronephrosis or nephrolithiasis.  Atherosclerotic calcifications are noted.  Abdominal aorta is  not aneurysmal.  There is moderate colonic stool.  There is ample colonic gas present.  There is no evidence of acute appendicitis identified.  Prostate is enlarged.  Coarse calcifications are noted within.  Urinary bladder demonstrates mild wall thickening likely related to under distension.  Differential would include cystitis or chronic hypertrophic changes related to enlarged prostate.  No intraperitoneal free air or free fluid appreciated.  No definite lymphadenopathy identified.  No acute displaced fracture noted.                        Preliminary result by Interface, Rad Results In (03/09/24 03:24:42)                   Impression:    1. The bladder is nondistended however the bladder wall appears thickened after considering state of nondistension which could reflect an element of cystitis.  2. There are emphysematous changes in the bilateral lungs, more pronounced in the bilateral upper lobes. A 4 cm bulla is seen in the anterior right upper lobe along the restrosternal region. There is a 4 mm ground glass nodule in the left lower lobe best visualized on series 4 image 66.  3. The stomach is markedly fluid distended.  4. The 3rd duodenal segment is not well delineated. Possibility of partial bowel obstruction secondary to SMA syndrome is not excluded. Correlate with clinical and laboratory findings as regards additional evaluation and follow-up to full resolution as indicated.  5. Details and other findings as discussed above.               Narrative:    START OF REPORT:  Technique: CT Scan of the chest abdomen and pelvis was performed without intravenous contrast with axial as well as sagittal and, coronal images.    Dosage Information: Automated Exposure Control was utilized.    Comparison: Comparison is with abdomen study dated 2022-10-22 09:44:39. Comparison is with chest study dated 2021-09-05 11:05:57.    Clinical History: Sepsis.    Findings:  Soft Tissues: Unremarkable.  Lines and Tubes: None.  Neck:  The visualized soft tissues of the neck appear unremarkable.  Mediastinum: The mediastinal structures are within normal limits.  Heart: The heart size is within normal limits. Mild coronary artery calcification is seen.  Aorta: Unremarkable appearing aorta.  Lungs: There is mild non specific dependent change at the lung bases. There are emphysematous changes in the bilateral lungs, more pronounced in the bilateral upper lobes. A 4 cm bulla is seen in the anterior right upper lobe along the restrosternal region. There is a 4 mm ground glass nodule in the left lower lobe best visualized on series 4 image 66.  Pleura: No effusions or pneumothorax are identified.  Bony Structures: Mild degenerative changes are seen in the right shoulder joint.  Spine: Mild spondylolytic changes are seen in the thoracic spine.  Ribs: The ribs appear unremarkable.  Abdomen:  Abdominal Wall: No abdominal wall pathology is seen.  Liver: The liver appears unremarkable.  Biliary System: No intrahepatic or extrahepatic biliary duct dilatation is seen.  Gallbladder: The gallbladder appears unremarkable.  Pancreas: The pancreas appears unremarkable.  Adrenals: The adrenal glands appear unremarkable.  Kidneys: The kidneys appear unremarkable with no stones cysts masses or hydronephrosis.  Aorta: There is moderate calcification of the.  Bowel:  Esophagus: The visualized esophagus appears unremarkable.  Stomach: The stomach is markedly fluid distended.  Duodenum: The 3rd duodenal segment is not well delineated.  Appendix: The appendix is not identified but no inflammatory changes are seen in the right lower quadrant to suggest appendicitis.  Peritoneum: No intraperitoneal free air or ascites is seen.    Pelvis:  Bladder: The bladder is nondistended however the bladder wall appears thickened after considering state of nondistension which could reflect an element of cystitis.  Male:  Prostate gland: The prostate gland is mildly enlarged. There are  numerous calcifications prostate gland.    Bony structures:  Dorsal Spine: There is moderate spondylosis of the visualized dorsal spine. There is grade I retrolisthesis of L1 over L2. There is severe disc space narrowing at L1-L2 with pronounced sclerosis of the apposing endplates.  Bony Pelvis: The visualized bony structures of the pelvis appear unremarkable.                                         CT Head Without Contrast (Final result)  Result time 03/09/24 09:56:13      Final result by Bobby Maguire MD (03/09/24 09:56:13)                   Impression:      1. No acute intracranial abnormalities identified.  2. [employer] concordance.      Electronically signed by: Bobby Maguire MD  Date:    03/09/2024  Time:    09:56               Narrative:    EXAMINATION:  CT HEAD WITHOUT CONTRAST    CLINICAL HISTORY:  Slurred speech, left facial numbness.    TECHNIQUE:  Axial CT images were obtained of the brain without intravenous contrast.  Coronal and sagittal reformations were obtained.  Automated exposure control utilized to reduce radiation dose.  Total exam DLP is 975 mGy cm.    COMPARISON:  08/28/2012    FINDINGS:  Gray-white matter differentiation is within normal limits. There is chronic involutional change.  There is chronic white matter microischemic change.  There is minimal intracranial atherosclerosis.  No acute intracranial hemorrhage, extra-axial fluid collection, hydrocephalus, mass effect, or midline shift is noted.  No large vessel territory acute ischemia is identified.  Visualized paranasal sinuses are clear.  Visualized mastoid air cells are sclerotic bilaterally could reflect sequela of chronic mastoiditis change.  No acute displaced calvarial fracture is identified.                        Preliminary result by Interface, Rad Results In (03/09/24 03:17:53)                   Impression:    1. No acute intracranial process identified. Details and other findings as noted above.               Narrative:     START OF REPORT:  Technique: CT of the head was performed without intravenous contrast with axial as well as coronal and sagittal images.    Comparison: Comparison is with study [id number] 17:28:39.    Dosage Information: Automated exposure control was utilized.    Clinical history: Cerebrovascular Accident (Pt arrives via [facility], EMS reports pt last seen normal at 0000, spouse reported to EMS pt speech slurred, numbness in the left face. On arrival pt has mild slurred speech, no facial asymmetry, no decreased strength, decreased sensation to the RLE.    Findings:  Hemorrhage: No acute intracranial hemorrhage is seen.  CSF spaces: The ventricles, sulci and basal cisterns all appear somewhat prominent suggesting an element of global cerebral atrophy.  Brain parenchyma: There is preservation of the grey white junction throughout. No acute infarct. Subtle scattered microvascular change is seen in portions of the periventricular and deep white matter tracts.  Cerebellum: Unremarkable.  Vascular: Unremarkable venous sinuses.  Sella and skull base: The sella appears to be within normal limits for age.  Cerebellopontine angles: Within normal limits.  Herniation: None.  Intracranial calcifications: Incidental note is made of bilateral choroid plexus calcification. Incidental note is made of some pineal region calcification.  Calvarium: No acute linear or depressed skull fracture is seen.    Maxillofacial Structures:  Paranasal sinuses: The visualized paranasal sinuses appear clear with no mucoperiosteal thickening or air fluid levels identified.  Orbits: The orbits appear unremarkable.  Zygomatic arches: The zygomatic arches are intact and unremarkable.  Temporal bones and mastoids: The bilateral somewhat sclerotic suggesting chronic mastoiditis.  TMJ: The mandibular condyles appear normally placed with respect to the mandibular fossa.                                         X-Ray Chest AP Portable (Final result)   Result time 03/09/24 10:27:35      Final result by Chevy Santillan MD (03/09/24 10:27:35)                   Impression:      No acute findings in the chest      Electronically signed by: Chevy Santillan MD  Date:    03/09/2024  Time:    10:27               Narrative:    EXAMINATION:  XR CHEST AP PORTABLE    CLINICAL HISTORY:  Respiratory failure, unspecified, unspecified whether with hypoxia or hypercapnia    COMPARISON:  03/05/2024    FINDINGS:  Single view of the chest shows improved central aeration compared to the prior study without focal consolidation, pneumothorax or pleural effusion.  Cardiac silhouette and pulmonary vasculature are normal.                                       Impression  1. Streptococcus dysgalactiae sepsis  2.  Left lower extremity cellulitis  3. Acute kidney injury   4. Transaminitis   5.  Rhabdomyolysis  6. Small bowel obstruction/colonic ileus  7.  Anasarca/ascites/bilateral pleural effusions   8.  Polysubstance abuse  9.  Anemia    Recommendations  I agree with the management of this patient.  History of polysubstance abuse but denies recent IVDU, recently diagnosed with pneumonia and placed on oral antibiotics with Augmentin and azithromycin, blood cultures at the time yielding Streptococcus dysgalactiae and on this admission blood cultures with same Streptococcus dysgalactiae.  He will need a FREEMAN to rule out endocarditis, especially considering findings which could be thromboembolic/ischemia related.  He was noted to have left lower extremity blister with some erythema though overall not very impressive and has generalized edema with anasarca, pleural effusion and ascites.  Elevated CPK, transaminitis and lactic acidosis trending down.  We will continue antibiotic coverage with placement on ceftriaxone and Flagyl, with discontinuation of Zosyn avoiding its potential nephrotoxicity in the setting of acute kidney injury.  Findings of partial small bowel obstruction and ileus  concerning, we do recommend consultation to surgery team for evaluation and management.  We will follow with labs in a.m. Hepatitis-B serology positive and  we will evaluate further with HCV quantitative RNA.  Guarded prognosis.  Case is discussed at length with patient, wife at the bedside and with nursing staff.  I would like to thank the team very much for the opportunity to assist in the care of this patient

## 2024-03-12 NOTE — PROGRESS NOTES
[facility] - [facility] Medical Telemetry  Wound Care    Patient Name:  Sebastian Davis   MRN:  [id number]  Date: 3/12/2024  Diagnosis: <principal problem not specified>    History:     Past Medical History:   Diagnosis Date    Basal cell carcinoma     Nose    Cord compression     Herniated disc, cervical     Thoracic spinal stenosis        Social History     Socioeconomic History    Marital status:    Tobacco Use    Smoking status: Every Day     Current packs/day: 1.00     Types: Cigarettes    Smokeless tobacco: Never   Substance and Sexual Activity    Alcohol use: Yes     Alcohol/week: 3.0 standard drinks of alcohol     Types: 3 Cans of beer per week    Drug use: No    Sexual activity: Yes     Partners: Female     Social Determinants of Health     Social Connections: Unknown (3/11/2024)    Social Connection and Isolation Panel [NHANES]     Marital Status: Living with partner       Precautions:     Allergies as of 03/09/2024    (No Known Allergies)       WO Assessment Details/Treatment      03/12/24 0922   WOCN Assessment   Visit Date 03/12/24   Visit Time 0922   Consult Type New   [facility] Speciality Wound   Intervention chart review;assessed;applied;orders   Teaching on-going        Altered Skin Integrity 03/11/24 0800 Coccyx Intact skin with non-blanchable redness of localized area   Date First Assessed/Time First Assessed: 03/11/24 0800   Altered Skin Integrity Present on Admission - Did Patient arrive to the hospital with altered skin?: yes  Location: Coccyx  Description of Altered Skin Integrity: Intact skin with non-blanchable...   Wound Image    Dressing Appearance Dry;Clean;Intact   Drainage Amount None   Drainage Characteristics/Odor No odor   Appearance Intact;Pink;Red;Dry   Tissue loss description Not applicable   Black (%), Wound Tissue Color 0 %   Red (%), Wound Tissue Color 100 %   Yellow (%), Wound Tissue Color 0 %   Periwound Area Intact;Dry;Pink   Wound Edges Undefined   Care Cleansed  with:;Soap and water   Dressing Applied;Foam        Altered Skin Integrity 03/12/24 0800 Left anterior;lower Leg Blister(s)   Date First Assessed/Time First Assessed: 03/12/24 0800   Side: Left  Orientation: anterior;lower  Location: Leg  Primary Wound Type: Blister(s)   Wound Image    Dressing Appearance Open to air   Drainage Amount None   Drainage Characteristics/Odor No odor   Appearance Intact;Yellow;Blistered   Tissue loss description Partial thickness   Black (%), Wound Tissue Color 0 %   Red (%), Wound Tissue Color 0 %   Yellow (%), Wound Tissue Color 100 %   Periwound Area Intact;Dry;Pink   Wound Edges Defined   Wound Length (cm) 2 cm   Wound Width (cm) 2.7 cm   Wound Depth (cm) 0.1 cm   Wound Volume (cm^3) 0.54 cm^3   Wound Surface Area (cm^2) 5.4 cm^2   Care Cleansed with:;Soap and water     WOCN consulted for sacrum. Discussed plan of care with nurse Ramos prior to visiting the patient. No family at bedside. Treatment recommendations put in place. Sacrum: Cleanse area with soap and water, dry well. Apply mepilex bordered foam and change every 2 days. Area to be assessed at least once a shift. Left shin: Cleanse with soap and water, dry well. Apply adaptic then dry gauze, wrap with kerlex and secure with medipore tape BID and PRN with soilage. Nursing to continue with treatment recommendations and other preventative measures. Educated the patient on offloading by turning every 2 hours while in bed, he verbalized understanding. Patient states he needs help mobilizing. Ordered WESLEY mattress with sizewise. Wedge placed on left side at the end of visit. Will follow up.   03/12/2024

## 2024-03-13 LAB
ABS NEUT (OLG): 16.54 X10(3)/MCL (ref 2.1–9.2)
ALBUMIN SERPL-MCNC: 1.4 G/DL (ref 3.5–5)
ALBUMIN/GLOB SERPL: 0.5 RATIO (ref 1.1–2)
ALP SERPL-CCNC: 121 UNIT/L (ref 40–150)
ALT SERPL-CCNC: 189 UNIT/L (ref 0–55)
ANISOCYTOSIS BLD QL SMEAR: ABNORMAL
AST SERPL-CCNC: 209 UNIT/L (ref 5–34)
BILIRUB SERPL-MCNC: 1.3 MG/DL
BUN SERPL-MCNC: 56.1 MG/DL (ref 8.4–25.7)
CALCIUM SERPL-MCNC: 7.3 MG/DL (ref 8.4–10.2)
CHLORIDE SERPL-SCNC: 94 MMOL/L (ref 98–107)
CK SERPL-CCNC: 299 U/L (ref 30–200)
CO2 SERPL-SCNC: 32 MMOL/L (ref 22–29)
CREAT SERPL-MCNC: 1.34 MG/DL (ref 0.73–1.18)
ERYTHROCYTE [DISTWIDTH] IN BLOOD BY AUTOMATED COUNT: 14.7 % (ref 11.5–17)
GFR SERPLBLD CREATININE-BSD FMLA CKD-EPI: >60 MLS/MIN/1.73/M2
GLOBULIN SER-MCNC: 3.1 GM/DL (ref 2.4–3.5)
GLUCOSE SERPL-MCNC: 110 MG/DL (ref 74–100)
HCT VFR BLD AUTO: 31.1 % (ref 42–52)
HGB BLD-MCNC: 10.6 G/DL (ref 14–18)
INSTRUMENT WBC (OLG): 16.71 X10(3)/MCL
LACTATE SERPL-SCNC: 1.6 MMOL/L (ref 0.5–2.2)
MACROCYTES BLD QL SMEAR: ABNORMAL
MCH RBC QN AUTO: 30.1 PG (ref 27–31)
MCHC RBC AUTO-ENTMCNC: 34.1 G/DL (ref 33–36)
MCV RBC AUTO: 88.4 FL (ref 80–94)
NEUTROPHILS NFR BLD MANUAL: 99 %
NRBC BLD AUTO-RTO: 0 %
PATH REV: NORMAL
PLASMA CELLS BLD QL SMEAR: 1 %
PLATELET # BLD AUTO: 40 X10(3)/MCL (ref 130–400)
PLATELET # BLD EST: ABNORMAL 10*3/UL
PLATELETS.RETICULATED NFR BLD AUTO: 16.1 % (ref 0.9–11.2)
PMV BLD AUTO: ABNORMAL FL
POCT GLUCOSE: 102 MG/DL (ref 70–110)
POIKILOCYTOSIS BLD QL SMEAR: ABNORMAL
POTASSIUM SERPL-SCNC: 3 MMOL/L (ref 3.5–5.1)
PROT SERPL-MCNC: 4.5 GM/DL (ref 6.4–8.3)
RBC # BLD AUTO: 3.52 X10(6)/MCL (ref 4.7–6.1)
RBC MORPH BLD: ABNORMAL
SODIUM SERPL-SCNC: 136 MMOL/L (ref 136–145)
TARGETS BLD QL SMEAR: ABNORMAL
WBC # SPEC AUTO: 16.71 X10(3)/MCL (ref 4.5–11.5)

## 2024-03-13 PROCEDURE — 63600175 PHARM REV CODE 636 W HCPCS: Performed by: INTERNAL MEDICINE

## 2024-03-13 PROCEDURE — 25000003 PHARM REV CODE 250: Performed by: INTERNAL MEDICINE

## 2024-03-13 PROCEDURE — 85007 BL SMEAR W/DIFF WBC COUNT: CPT | Performed by: INTERNAL MEDICINE

## 2024-03-13 PROCEDURE — 27000221 HC OXYGEN, UP TO 24 HOURS

## 2024-03-13 PROCEDURE — 80053 COMPREHEN METABOLIC PANEL: CPT | Performed by: INTERNAL MEDICINE

## 2024-03-13 PROCEDURE — 82550 ASSAY OF CK (CPK): CPT | Performed by: INTERNAL MEDICINE

## 2024-03-13 PROCEDURE — 94760 N-INVAS EAR/PLS OXIMETRY 1: CPT

## 2024-03-13 PROCEDURE — 83605 ASSAY OF LACTIC ACID: CPT | Performed by: INTERNAL MEDICINE

## 2024-03-13 PROCEDURE — 21400001 HC TELEMETRY ROOM

## 2024-03-13 RX ORDER — POTASSIUM CHLORIDE 14.9 MG/ML
20 INJECTION INTRAVENOUS
Status: COMPLETED | OUTPATIENT
Start: 2024-03-13 | End: 2024-03-13

## 2024-03-13 RX ADMIN — POTASSIUM CHLORIDE 20 MEQ: 14.9 INJECTION, SOLUTION INTRAVENOUS at 05:03

## 2024-03-13 RX ADMIN — NYSTATIN 500000 UNITS: 100000 SUSPENSION ORAL at 08:03

## 2024-03-13 RX ADMIN — POTASSIUM CHLORIDE 20 MEQ: 14.9 INJECTION, SOLUTION INTRAVENOUS at 03:03

## 2024-03-13 RX ADMIN — CEFTRIAXONE SODIUM 2 G: 2 INJECTION, POWDER, FOR SOLUTION INTRAMUSCULAR; INTRAVENOUS at 03:03

## 2024-03-13 NOTE — PROGRESS NOTES
[facility] Hospital Medicine Progress Note        Chief Complaint: Inpatient Follow-up for sepsis    HPI per admitting team:   Sebastian Davis is a 54 y.o. male with a PMHx of polysubstance abuse and chronic back pain  who presented to [facility] via EMS on 3/9/2024 with c/o generalized weakness and numbness to fingers and toes, slurred speech and numbness to the left side of his face upon awakening at midnight.  Patient last smoked methamphetamine  and marijuana x3 days ago.  He denied any IV drug use, fever, CP. Symptoms apparently resolved prior to arriving in the ED. NIH of 0 and resolution of symptoms therefore stroke workup not obtained initially.  However patient began experiencing these symptoms again while in the ED. He also complains of bilateral lower extremity weakness, numbness.  He also endorsed a fall x1 day ago.     Of note, patient was seen in the ED on 03/05/2024 with complaints of chronic back pain requesting pain medication also noted to have a fever and cough; CXR demonstrated confluent airspace opacities in the left infrahilar region and early infiltrate can not be excluded.  He was discharged home on Augmentin and azithromycin for pneumonia. BLOOD CULTURES X2 FROM 3/6/2024 POSITIVE FOR Streptococcus dysgalactiae ssp equisimilis.     Upon presentation to ED, vital signed included /108, , RR 18, temperature 98.3° F. Labs notable for WBC 18.15, platelets 114, sodium 129, potassium 5.2, chloride 91, CO2 11, BUN 67.5, creatinine 3.82, calcium 7.9, albumin 2.7, , , CPK 10,110.  Lactic acid elevated at 9.4 and repeat 7.6.  Acetaminophen and salicylate level undetectable.  UDS positive for amphetamines, fentanyl and cannabinoids.  Influenza, RSV and COVID-19 negative.  Urinalysis with 2+ protein, trace ketones, 3+ blood, positive nitrites, 2+ bilirubin, 4 urobilinogen, 6-10 RBCs, 21-50 WBCs and moderate bacteria.  Urine creatinine 61.9.  Blood  cultures x2 and urine culture pending. CT head without contrast negative for acute intracranial process.  CT chest abdomen and pelvis without contrast demonstrated nondistended bladder however bladder wall appears thickened which could reflect an element of cystitis; emphysematous changes in bilateral lungs more pronounced in bilateral upper lobes, 4 cm bulla seen in the anterior right upper lobe along the retrosternal region and a 4 mm ground-glass nodule in the left lower lobe; stomach is markedly fluid distended; 3rd duodenal segment is not well delineated possibility of partial bowel obstruction secondary to SMA syndrome is not excluded.  He was started on broad-spectrum IV antibiotics in ED, also received sodium bicarbonate 50 mEq, and calcium gluconate 1 g in ED.  Admitted to hospital medicine service for further medical management.     At the time of exam patient is pale, diaphoretic, tachycardic, dyspneic on supplemental oxygen.  Notified by nurse that lactic acid and CPK are up trending.  ICU nurse, ED nurse and nursing supervisor present at the bedside.  Attending MD notified.            Interval Hx:     03/10/2024 Dr. Overton-chart reviewed patient examined.  Patient admitted for sepsis with blood cultures already positive from a previous ER visit 2 days before this admission.  Patient was found to have Streptococcus dysgalactiae of the ER visit 3/6/24.  On this admission 03/09/2024 patient was admitted and placed on vancomycin and Zosyn.  Also he had findings of rhabdomyolysis/OKSANA/hypotensive/electrolyte abnormalities.  Patient was fluid resuscitated and vital signs much improved.  He had issues with hypoglycemia in the presence of sepsis that resolved with glucagon and changing fluids to dextrose.  Patient has an anion gap metabolic acidosis with findings of elevated lactic acid/very low CO2/rhabdo and we decided to place him on a sodium bicarb drip.  Overall he is improved.  I reviewed his CT of abdomen  since he remains very tender and have decided to place him NPO secondary to fluid-filled stomach.  We will DC vancomycin and continue Zosyn.  We will continue to replace electrolytes.  His platelets have dropped to around 26,000. We will DC Lovenox workup thrombocytopenia to exclude any causes or than sepsis          3/11/24 dr overton - looks improved  from admission . Plt's dropped even further. Peripheral smear with thrombocytopenia with small plt clumps. No active bleeding . Will repeat blood cx's for tomorrow am and will consult ID since pt may need FREEMAN since TTE  w poor windows. Hematology consulted for eval of profound thrombocytopenia on a pt with decrease retic count and sepsis. Will start liquid diet . Creatinine is trending down. Ck's at around 3000. Co2  26, will dc sodium b icarbonate drip and continue d5 nss to avoid hypoglycemia . Blood sugars wnl, no further hypoglycemia .      3/12/24 dr overton - looking better/ creat improving as well as plt's. Thrombocytopenia probably related to sepsis[place] at [id number] from [id number].  Creat at 1.5. abdomen is distended w/o rebound. Will do ct w contrast and continue fluids at 125 cc.     Blood cx's repeated today . Lactic acid trendingdown     03/13/2024 Dr. Overton-to start with, we were able to put an NG tube yesterday obtaining around 2.5 L of bilious fluid.  CT abdomen with IV/oral contrast was done yesterday showing partial small-bowel obstruction/ileus/ascites/anasarca/bilateral pleural effusions.  White blood cell count improving as well as LFTs/renal function.  We will continue to replace electrolytes, potassium.  Blood cultures drawn on 03/12/2024 ntd   Now on rocephin 2 grams               Case was discussed with patient's nurse and  on the floor.  Objective/physical exam:  General: a./a o x 3 .complains of abd pain on palpation   Chest: Clear to auscultation bilaterally  Heart: tachycardic at 108, +S1, S2, no appreciable murmur  Abdomen:  voluntary guarding with generalized tenderness on palpation, Bs decrease. Very tender and distended  MSK: Warm, no lower extremity edema, no clubbing or cyanosis  Neurologic: Alert and oriented x4, Cranial nerve II-XII intact, Strength 5/5 in all 4 extremities    VITAL SIGNS: 24 HRS MIN & MAX LAST   Temp  Min: 97.6 °F (36.4 °C)  Max: 98.1 °F (36.7 °C) 98.1 °F (36.7 °C)   BP  Min: 116/78  Max: 157/83 126/77   Pulse  Min: 91  Max: 100  94   Resp  Min: 18  Max: 20 18   SpO2  Min: 91 %  Max: 99 % 98 %     I have reviewed the following labs:  Recent Labs   Lab 03/09/24  0129 03/09/24  0844 03/09/24  1804 03/11/24  0833 03/12/24  0353 03/13/24 0414   WBC 18.15  18.15* 8.33  8.33   < > 14.75  14.75* 19.56  19.56* 16.71  16.71*   RBC 5.26 5.29   < > 4.16* 3.80* 3.52*   HGB 15.9 16.0   < > 12.8* 11.5* 10.6*   HCT 46.7 46.4   < > 34.8* 31.9* 31.1*   MCV 88.8 87.7   < > 83.7 83.9 88.4   MCH 30.2 30.2   < > 30.8 30.3 30.1   MCHC 34.0 34.5   < > 36.8* 36.1* 34.1   RDW 14.3 14.4   < > 14.0 14.1 14.7   * 94*   < > 24* 28* 40*   MPV 12.8* 12.7*  --   --   --   --     < > = values in this interval not displayed.       Recent Labs   Lab 03/09/24  0129 03/09/24  0410 03/09/24  0844 03/09/24  1804 03/10/24  0547 03/11/24  0422 03/12/24  0353 03/12/24  1854 03/13/24  0414   *  --    < > 125*   < > 130* 135* 133* 136   K 5.2*  --    < > 3.8   < > 3.5 2.7* 2.9* 3.0*   CO2 11*  --    < > 17*   < > 26 31* 31* 32*   BUN 67.5*  --    < > 71.0*   < > 75.1* 62.1* 59.7* 56.1*   CREATININE 3.82*  --    < > 3.00*   < > 2.08* 1.51* 1.58* 1.34*   CALCIUM 7.9*  --    < > 6.9*   < > 6.8* 7.2* 8.0* 7.3*   PH  --  7.420  --   --   --   --   --   --   --    MG 2.60  --   --  2.00  --   --  2.30  --   --    ALBUMIN 2.7*  --    < > 1.9*   < > 1.6* 1.5*  --  1.4*   ALKPHOS 80  --    < > 54   < > 50 87  --  121   *  --    < > 654*   < > 410* 283*  --  189*   *  --    < > 1,140*   < > 494* 313*  --  209*   BILITOT 1.4  --    <  > 1.3   < > 1.5 1.5  --  1.3    < > = values in this interval not displayed.       Microbiology Results (last 7 days)       Procedure Component Value Units Date/Time    Blood Culture [[id number]]  (Normal) Collected: 03/12/24 0348    Order Status: Completed Specimen: Blood from Antecubital, Left Updated: 03/13/24 0410     CULTURE, BLOOD ([facility]) No Growth At 24 Hours    Blood Culture [[id number]]  (Normal) Collected: 03/12/24 0353    Order Status: Completed Specimen: Blood from Hand, Left Updated: 03/13/24 0410     CULTURE, BLOOD ([facility]) No Growth At 24 Hours    Blood culture #1 **CANNOT BE ORDERED STAT** [[id number]]  (Abnormal)  (Susceptibility) Collected: 03/09/24 0129    Order Status: Completed Specimen: Blood Updated: 03/11/24 1207     CULTURE, BLOOD ([facility]) Streptococcus dysgalactiae ssp equisimilis     GRAM STAIN 1 of 1 Aerobic bottle positive      Gram Positive Cocci, probable Streptococcus      Seen in gram stain of broth only    Blood culture #2 **CANNOT BE ORDERED STAT** [[id number]]  (Abnormal)  (Susceptibility) Collected: 03/09/24 0129    Order Status: Completed Specimen: Blood Updated: 03/11/24 1152     CULTURE, BLOOD ([facility]) Streptococcus dysgalactiae ssp equisimilis     GRAM STAIN 2 of 2 bottles positive      Gram Positive Cocci, probable Streptococcus      Seen in gram stain of broth only    Urine culture [[id number]] Collected: 03/09/24 0217    Order Status: Completed Specimen: Urine Updated: 03/11/24 0916     Urine Culture No Growth    Stool Culture [[id number]]     Order Status: Sent Specimen: Stool     BCID2 Panel [[id number]]  (Abnormal) Collected: 03/09/24 0129    Order Status: Completed Specimen: Blood Updated: 03/09/24 1705     CTX-M (ESBL ) N/A     IMP (Cabapenemase ) N/A     KPC resistance gene (Carbapenemase ) N/A     mcr-1 N/A     mecA ID N/A     Comment: Note: Antimicrobial resistance can occur via multiple mechanisms. A Not Detected result for antimicrobial resistance  gene(s) does not indicate antimicrobial susceptibility. Subculturing is required for species identification and susceptibility testing of   isolates.        mecA/C and MREJ (MRSA) gene N/A     NDM (Carbapenemase ) N/A     OXA-48-like (Carbapenemase ) N/A     Isela/B (VRE gene) N/A     VIM (Carbapenemase ) N/A     Enterococcus faecalis Not Detected     Enterococcus faecium Not Detected     Listeria monocytogenes Not Detected     Staphylococcus spp. Not Detected     Staphylococcus aureus Not Detected     Staphylococcus epidermidis Not Detected     Staphylococcus lugdunensis Not Detected     Streptococcus spp. Detected     Streptococcus agalactiae (Group B) Not Detected     Streptococcus pneumoniae Not Detected     Streptococcus pyogenes (Group A) Not Detected     Acinetobacter calcoaceticus/baumannii complex Not Detected     Bacteroides fragilis Not Detected     Enterobacterales Not Detected     Enterobacter cloacae complex Not Detected     Escherichia coli Not Detected     Klebsiella aerogenes Not Detected     Klebsiella oxytoca Not Detected     Klebsiella pneumoniae group Not Detected     Proteus spp. Not Detected     Salmonella spp. Not Detected     Serratia marcescens Not Detected     Haemophilus influenzae Not Detected     Neisseria meningitidis Not Detected     Pseudomonas aeruginosa Not Detected     Stenotrophomonas maltophilia Not Detected     Candida albicans Not Detected     Candida auris Not Detected     Candida glabrata Not Detected     Candida krusei Not Detected     Candida parapsilosis Not Detected     Candida tropicalis Not Detected     Cryptococcus neoformans/gattii Not Detected    Narrative:      The [employer] BCID2 Panel is a multiplexed nucleic acid test intended for the use with [employer]® 2.0 or [employer]® HW Systems for the simultaneous qualitative detection and identification of multiple bacterial and yeast nucleic acids and select genetic determinants  associated with antimicrobial resistance.  The [employer] BCID2 Panel test is performed directly on blood culture samples identified as positive by a continuous monitoring blood culture system.  Results are intended to be interpreted in conjunction with Gram stain results.             See below for Radiology    Scheduled Med:   cefTRIAXone (Rocephin) IV (PEDS and ADULTS)  2 g Intravenous Q24H    folic acid  1 mg Oral Daily    nystatin  500,000 Units Oral QID      Continuous Infusions:   dextrose 5 % and 0.9 % NaCl 125 mL/hr at 03/11/24 1615      PRN Meds:  acetaminophen, aluminum-magnesium hydroxide-simethicone, glucagon (human recombinant), HYDROmorphone, levalbuterol, melatonin, ondansetron, polyethylene glycol, prochlorperazine, senna-docusate 8.6-50 mg, sodium chloride 0.9%     Assessment/Plan:  Severe sepsis secondary to Streptococcus dysgalactiae ssp equisimilis   - zosyn>>>>>>>rocephin 2 grams   - blood cx's repeated 3-12-24 ntd    Acute hypoxemic respiratory failure  - resolved     Possible partial bowel obstruction secondary to SMA syndrome by ct abd  - seen by surgical hospitalist- no intervention   - keep npo secondary to fluid filled stomach  - get kub- nl gas pattern  - gi pcr  - will do ct abd and pelvis w iv contrast     OKSANA   - improving   - no obstruction seen on non contrast ct abdomen   - retroperitoneal US- no renal abnormalities     Lactic acidosis  - improving      Rhabdomyolysis  -  dc continue sodium bicarbonate drip   -improving 3900, will recheck     Transaminitis  -improving    Leukocytosis  - improving         Thrombocytopenia  -improving / prob related to sepsis   -appreciate assistance from hematology   -  lovenox discontinued .......scd  -peripheral smear w small clum/folic acid 9/ retic count low     Hyponatremia/Hypochloremia   - much improved     Hyperkalemia>>>hypokalemia  - continue to replace    Intractable hypoglycemia  - post glucagon  - fluids change to D5 1/2 nss   - continue to  monitor  -resolved     Abd pain / small bowel obstruction /ileus  -initially seen by surgical hospitalist  -now npo  - lets get ct abd /pelvis w iv contrast and continue hydration   - ng tube  w 2.5 liters billous fluid      Hypocalcemia after correction   - calcium gluconate x one 3/10/24  - corrected       Ascites/bilateral pleural effusions/anasarca      Polysubstance abuse-UDS positive for amphetamine, fentanyl, cannabinoids  Tobacco abuse   Chronic back pain  4 mm ground-glass left upper lobe nodule  Hep c ab +, tx naive      Plan:   continue NPO  Continue NG tube low intermittent suction  Will notify surgical hospitalist so they can round on Mr. Davis  CK/lactic acid/CMP/CBC with Mag for a.m.  40mEq KCL in 200 cc normal saline solution x1  SCDs  Rocephin 2 grams     Overall, condition is improving.  Appreciate assistance from Infectious Disease as well as Hematology.  Cultures repeated 03/12/2024 negative at 24 hours.          Cc time 45 minutes   Cc dx- severe sepsis       VTE prophylaxis:     Patient condition:  Guarded    Anticipated discharge and Disposition:   tbd      All diagnosis and differential diagnosis have been reviewed; assessment and plan has been documented; I have personally reviewed the labs and test results that are presently available; I have reviewed the patients medication list; I have reviewed the consulting providers response and recommendations. I have reviewed or attempted to review medical records based upon their availability    All of the patient's questions have been  addressed and answered. Patient's is agreeable to the above stated plan. I will continue to monitor closely and make adjustments to medical management as needed.  _____________________________________________________________________    Nutrition Status:    Radiology:  I have personally reviewed the following imaging and agree with the radiologist.     XR Gastric tube check, non-radiologist  performed  EXAMINATION:  XR GASTRIC TUBE CHECK, NON-RADIOLOGIST PERFORMED    CLINICAL HISTORY:  placement;    TECHNIQUE:  AP View(s) of the abdomen was performed.    COMPARISON:  None.    FINDINGS:  Enteric tube terminates at the proximal stomach.  Side port above the GE junction.  Recommend advancing approximately 10 cm.    Electronically signed by: Kendal Emery  Date:    03/13/2024  Time:    13:27      Oniel Overton MD  Department of Hospital Medicine   [facility]   03/13/2024

## 2024-03-14 LAB
ALBUMIN SERPL-MCNC: 1.5 G/DL (ref 3.5–5)
ALBUMIN/GLOB SERPL: 0.4 RATIO (ref 1.1–2)
ALP SERPL-CCNC: 134 UNIT/L (ref 40–150)
ALT SERPL-CCNC: 136 UNIT/L (ref 0–55)
AST SERPL-CCNC: 148 UNIT/L (ref 5–34)
BASOPHILS # BLD AUTO: 0.08 X10(3)/MCL
BASOPHILS NFR BLD AUTO: 0.5 %
BILIRUB SERPL-MCNC: 1.6 MG/DL
BUN SERPL-MCNC: 39 MG/DL (ref 8.4–25.7)
CALCIUM SERPL-MCNC: 7.3 MG/DL (ref 8.4–10.2)
CHLORIDE SERPL-SCNC: 96 MMOL/L (ref 98–107)
CO2 SERPL-SCNC: 32 MMOL/L (ref 22–29)
CREAT SERPL-MCNC: 1.08 MG/DL (ref 0.73–1.18)
EOSINOPHIL # BLD AUTO: 0.04 X10(3)/MCL (ref 0–0.9)
EOSINOPHIL NFR BLD AUTO: 0.3 %
ERYTHROCYTE [DISTWIDTH] IN BLOOD BY AUTOMATED COUNT: 14.8 % (ref 11.5–17)
GFR SERPLBLD CREATININE-BSD FMLA CKD-EPI: >60 MLS/MIN/1.73/M2
GLOBULIN SER-MCNC: 3.4 GM/DL (ref 2.4–3.5)
GLUCOSE SERPL-MCNC: 140 MG/DL (ref 74–100)
HCT VFR BLD AUTO: 31.7 % (ref 42–52)
HGB BLD-MCNC: 10.6 G/DL (ref 14–18)
IMM GRANULOCYTES # BLD AUTO: 0.44 X10(3)/MCL (ref 0–0.04)
IMM GRANULOCYTES NFR BLD AUTO: 2.9 %
LYMPHOCYTES # BLD AUTO: 0.64 X10(3)/MCL (ref 0.6–4.6)
LYMPHOCYTES NFR BLD AUTO: 4.2 %
MAGNESIUM SERPL-MCNC: 2 MG/DL (ref 1.6–2.6)
MCH RBC QN AUTO: 29.9 PG (ref 27–31)
MCHC RBC AUTO-ENTMCNC: 33.4 G/DL (ref 33–36)
MCV RBC AUTO: 89.5 FL (ref 80–94)
MONOCYTES # BLD AUTO: 0.3 X10(3)/MCL (ref 0.1–1.3)
MONOCYTES NFR BLD AUTO: 2 %
NEUTROPHILS # BLD AUTO: 13.79 X10(3)/MCL (ref 2.1–9.2)
NEUTROPHILS NFR BLD AUTO: 90.1 %
NRBC BLD AUTO-RTO: 0 %
PLATELET # BLD AUTO: 65 X10(3)/MCL (ref 130–400)
PMV BLD AUTO: 12.5 FL (ref 7.4–10.4)
POCT GLUCOSE: 113 MG/DL (ref 70–110)
POCT GLUCOSE: 149 MG/DL (ref 70–110)
POTASSIUM SERPL-SCNC: 3 MMOL/L (ref 3.5–5.1)
PROT SERPL-MCNC: 4.9 GM/DL (ref 6.4–8.3)
RBC # BLD AUTO: 3.54 X10(6)/MCL (ref 4.7–6.1)
SODIUM SERPL-SCNC: 138 MMOL/L (ref 136–145)
WBC # SPEC AUTO: 15.29 X10(3)/MCL (ref 4.5–11.5)

## 2024-03-14 PROCEDURE — 25000003 PHARM REV CODE 250: Performed by: INTERNAL MEDICINE

## 2024-03-14 PROCEDURE — 63600175 PHARM REV CODE 636 W HCPCS: Performed by: INTERNAL MEDICINE

## 2024-03-14 PROCEDURE — 80053 COMPREHEN METABOLIC PANEL: CPT | Performed by: INTERNAL MEDICINE

## 2024-03-14 PROCEDURE — 21400001 HC TELEMETRY ROOM

## 2024-03-14 PROCEDURE — 83735 ASSAY OF MAGNESIUM: CPT | Performed by: INTERNAL MEDICINE

## 2024-03-14 PROCEDURE — 85025 COMPLETE CBC W/AUTO DIFF WBC: CPT | Performed by: INTERNAL MEDICINE

## 2024-03-14 PROCEDURE — 94760 N-INVAS EAR/PLS OXIMETRY 1: CPT

## 2024-03-14 PROCEDURE — 27000221 HC OXYGEN, UP TO 24 HOURS

## 2024-03-14 RX ORDER — POTASSIUM CHLORIDE 14.9 MG/ML
20 INJECTION INTRAVENOUS
Status: COMPLETED | OUTPATIENT
Start: 2024-03-14 | End: 2024-03-14

## 2024-03-14 RX ADMIN — NYSTATIN 500000 UNITS: 100000 SUSPENSION ORAL at 08:03

## 2024-03-14 RX ADMIN — LEUCINE, PHENYLALANINE, LYSINE, METHIONINE, ISOLEUCINE, VALINE, HISTIDINE, THREONINE, TRYPTOPHAN, ALANINE, GLYCINE, ARGININE, PROLINE, SERINE, TYROSINE, SODIUM ACETATE, DIBASIC POTASSIUM PHOSPHATE, MAGNESIUM CHLORIDE, SODIUM CHLORIDE, CALCIUM CHLORIDE, DEXTROSE
311; 238; 247; 170; 255; 247; 204; 179; 77; 880; 438; 489; 289; 213; 17; 297; 261; 51; 77; 33; 5 INJECTION INTRAVENOUS at 12:03

## 2024-03-14 RX ADMIN — CEFTRIAXONE SODIUM 2 G: 2 INJECTION, POWDER, FOR SOLUTION INTRAMUSCULAR; INTRAVENOUS at 05:03

## 2024-03-14 RX ADMIN — DEXTROSE AND SODIUM CHLORIDE: 5; 900 INJECTION, SOLUTION INTRAVENOUS at 09:03

## 2024-03-14 RX ADMIN — POTASSIUM CHLORIDE 20 MEQ: 14.9 INJECTION, SOLUTION INTRAVENOUS at 09:03

## 2024-03-14 RX ADMIN — POTASSIUM CHLORIDE 20 MEQ: 14.9 INJECTION, SOLUTION INTRAVENOUS at 12:03

## 2024-03-14 NOTE — PROGRESS NOTES
[facility] Hospital Medicine Progress Note        Chief Complaint: Inpatient Follow-up for     HPI per admitting team: Sebastian Davis is a 54 y.o. male with a PMHx of polysubstance abuse and chronic back pain who presented to [facility] via EMS on 3/9/2024 with c/o generalized weakness and numbness to fingers and toes, slurred speech and numbness to the left side of his face upon awakening at midnight.  Patient last smoked methamphetamine  and marijuana x3 days ago.  He denied any IV drug use, fever, CP. Symptoms apparently resolved prior to arriving in the ED. NIH of 0 and resolution of symptoms therefore stroke workup not obtained initially.  However patient began experiencing these symptoms again while in the ED. He also complains of bilateral lower extremity weakness, numbness.  He also endorsed a fall x1 day ago.  Of note, patient was seen in the ED on 03/05/2024 with complaints of chronic back pain requesting pain medication also noted to have a fever and cough; CXR demonstrated confluent airspace opacities in the left infrahilar region and early infiltrate can not be excluded.  He was discharged home on Augmentin and azithromycin for pneumonia. BLOOD CULTURES X2 FROM 3/6/2024 POSITIVE FOR Streptococcus dysgalactiae ssp equisimilis.  Upon presentation to ED, vital signed included /108, , RR 18, temperature 98.3° F. Labs notable for WBC 18.15, platelets 114, sodium 129, potassium 5.2, chloride 91, CO2 11, BUN 67.5, creatinine 3.82, calcium 7.9, albumin 2.7, , , CPK 10,110.  Lactic acid elevated at 9.4 and repeat 7.6.  Acetaminophen and salicylate level undetectable.  UDS positive for amphetamines, fentanyl and cannabinoids.  Influenza, RSV and COVID-19 negative.  Urinalysis with 2+ protein, trace ketones, 3+ blood, positive nitrites, 2+ bilirubin, 4 urobilinogen, 6-10 RBCs, 21-50 WBCs and moderate bacteria.  Urine creatinine 61.9.  Blood cultures x2 and urine  culture pending. CT head without contrast negative for acute intracranial process.  CT chest abdomen and pelvis without contrast demonstrated nondistended bladder however bladder wall appears thickened which could reflect an element of cystitis; emphysematous changes in bilateral lungs more pronounced in bilateral upper lobes, 4 cm bulla seen in the anterior right upper lobe along the retrosternal region and a 4 mm ground-glass nodule in the left lower lobe; stomach is markedly fluid distended; 3rd duodenal segment is not well delineated possibility of partial bowel obstruction secondary to SMA syndrome is not excluded.  He was started on broad-spectrum IV antibiotics in ED, also received sodium bicarbonate 50 mEq, and calcium gluconate 1 g in ED.  Admitted to hospital medicine service for further medical management.  At the time of exam patient is pale, diaphoretic, tachycardic, dyspneic on supplemental oxygen.  Notified by nurse that lactic acid and CPK are up trending.  ICU nurse, ED nurse and nursing supervisor present at the bedside.  Attending MD notified.  Patient admitted for sepsis with blood cultures already positive from a previous ER visit 2 days before this admission.  Patient was found to have Streptococcus dysgalactiae of the ER visit 3/6/24.  On this admission 03/09/2024 patient was admitted and placed on vancomycin and Zosyn.  Also he had findings of rhabdomyolysis/OKSANA/hypotensive/electrolyte abnormalities.  Patient was fluid resuscitated and vital signs much improved.  He had issues with hypoglycemia in the presence of sepsis that resolved with glucagon and changing fluids to dextrose.  Patient has an anion gap metabolic acidosis with findings of elevated lactic acid/very low CO2/rhabdo and we decided to place him on a sodium bicarb drip.  Overall he is improved.  I reviewed his CT of abdomen since he remains very tender and have decided to place him NPO secondary to fluid-filled stomach.  We will  DC vancomycin and continue Zosyn.  We will continue to replace electrolytes.  His platelets have dropped to around 26,000. We will DC Lovenox workup thrombocytopenia to exclude any causes or than sepsis    3/11/24 Looks improved  from admission . Plt's dropped even further. Peripheral smear with thrombocytopenia with small plt clumps. No active bleeding . Will repeat blood cx's for tomorrow am and will consult ID since pt may need FREEMAN since TTE w poor windows. Hematology consulted for eval of profound thrombocytopenia on a pt with decrease retic count and sepsis. Will start liquid diet . Creatinine is trending down. Ck's at around 3000. Co2  26, will dc sodium b icarbonate drip and continue d5 nss to avoid hypoglycemia . Blood sugars wnl, no further hypoglycemia .  3/12/24 dr hutchinson - looking better/ creat improving as well as plt's. Thrombocytopenia probably related to [place] [address] from [address].  Creat at 1.5. abdomen is distended w/o rebound. Will do ct w contrast and continue fluids at 125 cc.   Blood cx's repeated . Lactic acid trendingdown   Plaed NG tube-->  obtaining around 2.5 L of bilious fluid.  CT abdomen with IV/oral contrast was done howing partial small-bowel obstruction/ileus/ascites/anasarca/bilateral pleural effusions.  White blood cell count improving as well as LFTs/renal function.  We will continue to replace electrolytes, potassium.  Blood cultures drawn on 03/12/2024 ntd  Now on rocephin 2 grams   4 mm ground-glass left upper lobe nodule     Interval Hx:   Patient is laying in bed.  His girlfriend in friends at bedside.  Patient complained of abdominal distention.  Noted NG tube in place.  Friend asked if patient can eat or drink.  Informed history NPO now given ileus/small-bowel obstruction  Discussed high-risk of acid ammonia  Answered all their questions to the best of knowledge and their satisfaction  Case was discussed with patient's nurse and  on the  floor.    Objective/physical exam:  General:  Looks ill, NG tube in place and  Chest: Clear to auscultation bilaterally anteriorly  Heart:  Tachycardic rate and rhythm, +S1, S2, no appreciable murmur  Abdomen:  Distended but soft, bowel sounds absent  MSK: Warm, no lower extremity edema, no clubbing or cyanosis  Neurologic: Alert and oriented x4, able to move all 4 extremities but extremely weak    VITAL SIGNS: 24 HRS MIN & MAX LAST   Temp  Min: 97.7 °F (36.5 °C)  Max: 99.2 °F (37.3 °C) 97.9 °F (36.6 °C)   BP  Min: 117/79  Max: 146/88 (!) 146/88   Pulse  Min: 94  Max: 98  98   Resp  Min: 18  Max: 22 (!) 22   SpO2  Min: 95 %  Max: 100 % 98 %     I reviewed the labs below:  Recent Labs   Lab 03/09/24  0129 03/09/24  0844 03/09/24  1804 03/12/24  0353 03/13/24  0414 03/14/24  0409   WBC 18.15  18.15* 8.33  8.33   < > 19.56  19.56* 16.71  16.71* 15.29*   RBC 5.26 5.29   < > 3.80* 3.52* 3.54*   HGB 15.9 16.0   < > 11.5* 10.6* 10.6*   HCT 46.7 46.4   < > 31.9* 31.1* 31.7*   MCV 88.8 87.7   < > 83.9 88.4 89.5   MCH 30.2 30.2   < > 30.3 30.1 29.9   MCHC 34.0 34.5   < > 36.1* 34.1 33.4   RDW 14.3 14.4   < > 14.1 14.7 14.8   * 94*   < > 28* 40* 65*   MPV 12.8* 12.7*  --   --   --  12.5*    < > = values in this interval not displayed.     Recent Labs   Lab 03/09/24  0410 03/09/24  0844 03/09/24  1804 03/10/24  0547 03/12/24  0353 03/12/24  1854 03/13/24  0414 03/14/24  0409   NA  --    < > 125*   < > 135* 133* 136 138   K  --    < > 3.8   < > 2.7* 2.9* 3.0* 3.0*   CO2  --    < > 17*   < > 31* 31* 32* 32*   BUN  --    < > 71.0*   < > 62.1* 59.7* 56.1* 39.0*   CREATININE  --    < > 3.00*   < > 1.51* 1.58* 1.34* 1.08   CALCIUM  --    < > 6.9*   < > 7.2* 8.0* 7.3* 7.3*   PH 7.420  --   --   --   --   --   --   --    MG  --   --  2.00  --  2.30  --   --  2.00   ALBUMIN  --    < > 1.9*   < > 1.5*  --  1.4* 1.5*   ALKPHOS  --    < > 54   < > 87  --  121 134   ALT  --    < > 654*   < > 283*  --  189* 136*   AST  --    < >  1,140*   < > 313*  --  209* 148*   BILITOT  --    < > 1.3   < > 1.5  --  1.3 1.6*    < > = values in this interval not displayed.     Microbiology Results (last 7 days)       Procedure Component Value Units Date/Time    Blood Culture [[id number]]  (Normal) Collected: 03/12/24 0348    Order Status: Completed Specimen: Blood from Antecubital, Left Updated: 03/14/24 0405     CULTURE, BLOOD ([facility]) No Growth At 48 Hours    Blood Culture [[id number]]  (Normal) Collected: 03/12/24 0353    Order Status: Completed Specimen: Blood from Hand, Left Updated: 03/14/24 0405     CULTURE, BLOOD ([facility]) No Growth At 48 Hours    Blood culture #1 **CANNOT BE ORDERED STAT** [[id number]]  (Abnormal)  (Susceptibility) Collected: 03/09/24 0129    Order Status: Completed Specimen: Blood Updated: 03/11/24 1207     CULTURE, BLOOD ([facility]) Streptococcus dysgalactiae ssp equisimilis     GRAM STAIN 1 of 1 Aerobic bottle positive      Gram Positive Cocci, probable Streptococcus      Seen in gram stain of broth only    Blood culture #2 **CANNOT BE ORDERED STAT** [[id number]]  (Abnormal)  (Susceptibility) Collected: 03/09/24 0129    Order Status: Completed Specimen: Blood Updated: 03/11/24 1152     CULTURE, BLOOD ([facility]) Streptococcus dysgalactiae ssp equisimilis     GRAM STAIN 2 of 2 bottles positive      Gram Positive Cocci, probable Streptococcus      Seen in gram stain of broth only    Urine culture [[id number]] Collected: 03/09/24 0217    Order Status: Completed Specimen: Urine Updated: 03/11/24 0916     Urine Culture No Growth    Stool Culture [[id number]]     Order Status: Sent Specimen: Stool     BCID2 Panel [[id number]]  (Abnormal) Collected: 03/09/24 0129    Order Status: Completed Specimen: Blood Updated: 03/09/24 1705     CTX-M (ESBL ) N/A     IMP (Cabapenemase ) N/A     KPC resistance gene (Carbapenemase ) N/A     mcr-1 N/A     mecA ID N/A     Comment: Note: Antimicrobial resistance can occur via multiple mechanisms. A  Not Detected result for antimicrobial resistance gene(s) does not indicate antimicrobial susceptibility. Subculturing is required for species identification and susceptibility testing of   isolates.        mecA/C and MREJ (MRSA) gene N/A     NDM (Carbapenemase ) N/A     OXA-48-like (Carbapenemase ) N/A     Isela/B (VRE gene) N/A     VIM (Carbapenemase ) N/A     Enterococcus faecalis Not Detected     Enterococcus faecium Not Detected     Listeria monocytogenes Not Detected     Staphylococcus spp. Not Detected     Staphylococcus aureus Not Detected     Staphylococcus epidermidis Not Detected     Staphylococcus lugdunensis Not Detected     Streptococcus spp. Detected     Streptococcus agalactiae (Group B) Not Detected     Streptococcus pneumoniae Not Detected     Streptococcus pyogenes (Group A) Not Detected     Acinetobacter calcoaceticus/baumannii complex Not Detected     Bacteroides fragilis Not Detected     Enterobacterales Not Detected     Enterobacter cloacae complex Not Detected     Escherichia coli Not Detected     Klebsiella aerogenes Not Detected     Klebsiella oxytoca Not Detected     Klebsiella pneumoniae group Not Detected     Proteus spp. Not Detected     Salmonella spp. Not Detected     Serratia marcescens Not Detected     Haemophilus influenzae Not Detected     Neisseria meningitidis Not Detected     Pseudomonas aeruginosa Not Detected     Stenotrophomonas maltophilia Not Detected     Candida albicans Not Detected     Candida auris Not Detected     Candida glabrata Not Detected     Candida krusei Not Detected     Candida parapsilosis Not Detected     Candida tropicalis Not Detected     Cryptococcus neoformans/gattii Not Detected    Narrative:      The eSKY.pl BCID2 Panel is a multiplexed nucleic acid test intended for the use with [employer]® 2.0 or [employer]® [employer] Systems for the simultaneous qualitative detection and identification of multiple bacterial and  yeast nucleic acids and select genetic determinants associated with antimicrobial resistance.  The [employer] BCID2 Panel test is performed directly on blood culture samples identified as positive by a continuous monitoring blood culture system.  Results are intended to be interpreted in conjunction with Gram stain results.             See below for Radiology    Scheduled Med:   cefTRIAXone (Rocephin) IV (PEDS and ADULTS)  2 g Intravenous Q24H    folic acid  1 mg Oral Daily    nystatin  500,000 Units Oral QID      Continuous Infusions:   dextrose 5 % and 0.9 % NaCl 125 mL/hr at 03/11/24 1615      PRN Meds:  acetaminophen, aluminum-magnesium hydroxide-simethicone, glucagon (human recombinant), HYDROmorphone, levalbuterol, melatonin, ondansetron, polyethylene glycol, prochlorperazine, senna-docusate 8.6-50 mg, sodium chloride 0.9%     Assessment/Plan:  Severe sepsis secondary to Streptococcus dysgalactiae   Left lower extremity cellulitis   Acute hypoxemic respiratory failure secondary to bilateral pleural effusions  Possible partial bowel obstruction secondary to SMA syndrome by ct abd  OKSANA - resolved  Lactic acidosis- resolved  Rhabdomyolysis- resolved  Transaminitis- trending down slowly  Leukocytosis- persist  Hyponatremia/Hypochloremia - resolved  Severe hypokalemia - persist  Hypocalcemia -persist  Intractable hypoglycemia- resolved  Abd pain / small bowel obstruction /ileus  Ascites/anasarca  Thrombocytopenia  Polysubstance abuse-UDS positive for amphetamine, fentanyl, cannabinoids  Tobacco abuse   Chronic back pain    ID DR RILEY on board, appreciate recs, recommended may need FREEMAN to r/o thromboembolic phenomena  Zosyn switched to rocephin 2 grams daily to prevent nephrotoxicity  WBC persists, 15k  03/12- repeat Blood Cultures x2- negative at 48 hours.  Concern for partial small bowel obstruction/ileus--> general surgery evaluated the patient- no intervention   Continue NPO  Continue NG tube low intermittent  suction-->  2.5 liters billous fluid  Day 3 of being NPO--> start Clinimix E at 65 cc/hour  Decrease IV fluids to 60 cc/hour  Hep C Ab +, tx naive- ID aware  Gi pcr- pending collection  CT Abd and pelvis with IV contrast, oral contrast shows finding consistent with partial small-bowel obstruction, consistent with colonic ileus, interval development of large volume ascites, and told development of anasarca edema, bibasilar atelectasis with bilateral pleural effusion  Appreciate assistance from hematology for his persistent thrombocytopenia, recommended to hold anticoagulation till platelets are greater than 50,000, transfuse 1 unit platelet if platelet drops to less than 10,000 or started bleeding.  Recommend SCDs for VTE prevention in the interim  Received calcium gluconate x 1 on 3/10  KCL 40 meq IV x 1 given  NPO status and NG tube on suction  Overall, condition is guarded.  Appreciate all consultants with assist in his case  Morning CBC, CMP, Mag, phos ordered    VTE prophylaxis:    SCDs, avoid chem prophylaxis due to thrombocytopenia                      Patient condition:  Guarded    Anticipated discharge and Disposition:   TBD    Critical care note:  Critical care diagnosis:  Prolonged NPO requiring Clinimix infusion  Critical care interventions: Hands-on evaluation, review of labs/radiographs/records and discussion with patient and family if present  Critical care time spent: 35 minutes        All diagnosis and differential diagnosis have been reviewed; assessment and plan has been documented; I have personally reviewed the labs and test results that are presently available; I have reviewed the patients medication list; I have reviewed the consulting providers response and recommendations. I have reviewed or attempted to review medical records based upon their availability    All of the patient's questions have been  addressed and answered. Patient's is agreeable to the above stated plan. I will continue to  monitor closely and make adjustments to medical management as needed.  _____________________________________________________________________    Nutrition Status:  -  Radiology:   I have personally reviewed the images and agree with radiologist report  XR Gastric tube check, non-radiologist performed  EXAMINATION:  XR GASTRIC TUBE CHECK, NON-RADIOLOGIST PERFORMED    CLINICAL HISTORY:  placement;    TECHNIQUE:  AP View(s) of the abdomen was performed.    COMPARISON:  None.    FINDINGS:  Enteric tube terminates at the proximal stomach.  Side port above the GE junction.  Recommend advancing approximately 10 cm.    Electronically signed by: Kendal Emery  Date:    03/13/2024  Time:    13:27      Aniceto Montero MD  Department of Hospital Medicine   [facility]   03/14/2024

## 2024-03-14 NOTE — PROGRESS NOTES
Infectious Diseases Progress Note  54-year-old male with past medical history of basal cell carcinoma, polysubstance abuse and chronic back pain , seen at the ED at this same facility, [facility], on 03/06/2024, diagnosed with a pneumonia and discharged on Augmentin and azithromycin, is admitted this time on 03/09/2024, presenting via EMS to the ED with complaints of generalized weakness and numbness to fingers and toes, slurred speech and numbness to left side.  On presentation he was noted to have no fevers and no leukocytosis, thrombocytopenic, anemic, elevated AST of 120, , abnormal renal function with creatinine of 3.0.  CPK 15,967 and lactate 5.9.  Urine toxicology test positive for amphetamines, fentanyl and cannabis.  Hepatitis-C positive.  Blood cultures with Streptococcus dysgalactiae in 2 of 2 sets and a repeat blood culture from today 3/12 pending.  Urinalysis abnormal with 21-50 WBC, moderate bacteria but negative LE.  Review of his records show a 3/6 blood cultures from recent ED visit with Streptococcus dysgalactiae in 2 of 2 sets.  2D echocardiogram with valves not seen well.  Chest x-ray with no acute cardiopulmonary disease.  CT scan of the chest abdomen and pelvis with no acute abnormality identified in the chest, emphysematous changes present, urinary bladder wall thickening may reflect under distention, cystitis of chronic hypertrophic changes related to prostatomegaly, moderate colonic stool with ample gas.  Bilateral hand x-ray CT scan of C-spine, with no acute changes.  CT head with no acute abnormalities.  MRI of the brain and C-spine with no acute changes.  MRI of the lumbar spine with suspected moderate canal stenosis at L1-L4, multilevel neural foraminal stenosis, L2-L3 disc edema with adjacent endplate sclerosis favored to be degenerative.  Repeat CT scan of the abdomen and pelvis done today 3/13 with findings of partial small bowel obstruction,  colonic ileus, large volume ascites, anasarca, edema, bibasilar atelectasis and bilateral pleural effusions.  He was on antibiotic coverage with Zosyn, vancomycin which has been discontinued.   He is on ceftriaxone    Subjective:  No new complaints, no fevers, doing about the same.  Lying in bed in nonacute distress.  Wife at the bedside      Past Medical History:   Diagnosis Date    Basal cell carcinoma     Nose    Cord compression     Herniated disc, cervical     Thoracic spinal stenosis      Past Surgical History:   Procedure Laterality Date    BACK SURGERY      LAMINECTOMY      T10-T12     Social History     Socioeconomic History    Marital status:    Tobacco Use    Smoking status: Every Day     Current packs/day: 1.00     Types: Cigarettes    Smokeless tobacco: Never   Substance and Sexual Activity    Alcohol use: Yes     Alcohol/week: 3.0 standard drinks of alcohol     Types: 3 Cans of beer per week    Drug use: No    Sexual activity: Yes     Partners: Female     Social Determinants of Health     Social Connections: Unknown (3/11/2024)    Social Connection and Isolation Panel [NHANES]     Marital Status: Living with partner       ROS  Constitutional:  Positive for malaise/fatigue.   HENT: Negative.     Respiratory: Negative.     Gastrointestinal:  Positive for abdominal pain.   Genitourinary: Negative.    Musculoskeletal:  Positive for back pain.   Neurological:  Positive for weakness.   Endo/Heme/Allergies: Negative.    Psychiatric/Behavioral: Negative.     All other Systems review done and negative.    Review of patient's allergies indicates:  No Known Allergies      Scheduled Meds:   cefTRIAXone (Rocephin) IV (PEDS and ADULTS)  2 g Intravenous Q24H    folic acid  1 mg Oral Daily    nystatin  500,000 Units Oral QID    potassium chloride  20 mEq Intravenous Q2H     Continuous Infusions:   dextrose 5 % and 0.9 % NaCl 125 mL/hr at 03/14/24 0938     PRN Meds:acetaminophen, aluminum-magnesium  "hydroxide-simethicone, glucagon (human recombinant), HYDROmorphone, levalbuterol, melatonin, ondansetron, polyethylene glycol, prochlorperazine, senna-docusate 8.6-50 mg, sodium chloride 0.9%    Objective:  BP (!) 146/88   Pulse 98   Temp 97.9 °F (36.6 °C) (Oral)   Resp (!) 22   Ht 5' 10" (1.778 m)   Wt 81.6 kg (180 lb)   SpO2 98%   BMI 25.83 kg/m²     Physical Exam:   Physical Exam  Vitals reviewed.   Constitutional:       General: He is not in acute distress.  HENT:      Head: Normocephalic and atraumatic.      Mouth/Throat:   Cardiovascular:      Rate and Rhythm: Normal rate and regular rhythm.      Heart sounds: Normal heart sounds.   Pulmonary:      Effort: Pulmonary effort is normal. No respiratory distress.      Breath sounds: Normal breath sounds.   Abdominal:      General: Bowel sounds are normal. There is distension.      Palpations: Abdomen is soft.      Tenderness: There is abdominal tenderness.   Musculoskeletal:         General: No deformity.      Cervical back: Neck supple.      Right lower leg: Edema present.      Left lower leg: Edema present.   Skin:     Findings: No rash.      Comments: Left anterior leg blister with some swelling and erythema.  Coccygeal stage I pressure injury   Neurological:      Mental Status: He is alert and oriented to person, place, and time.   Psychiatric:      Comments: Calm and cooperative     Imaging  Imaging Results              MRI Thoracic Spine Without Contrast (Final result)  Result time 03/11/24 18:52:52      Final result by Aye Nath MD (03/11/24 18:52:52)                   Impression:      Postoperative changes at T11-T12 with chronic appearing cord signal changes and mild canal narrowing.      Electronically signed by: Aye Nath  Date:    03/11/2024  Time:    18:52               Narrative:    EXAMINATION:  MRI THORACIC SPINE WITHOUT CONTRAST    CLINICAL HISTORY:  Mid-back pain;    TECHNIQUE:  Multiplanar multisequence MR images of the " thoracic spine are obtained without contrast.    COMPARISON:  CT chest, abdomen and pelvis dated 03/09/2024    FINDINGS:  There is exaggerated kyphosis centered at T11-T12.  There are postoperative changes with prior decompressive laminectomies at T11-T12.  The vertebral heights are maintained.  The bone marrow is normal in signal.    There are T2 signal changes in the lower thoracic cord at T11-T12, with associated volume loss, likely chronic.  There is no epidural fluid collection.    There are multilevel degenerative changes with marginal osteophytes and facet arthropathy.  There is mild canal narrowing at T11-T12 secondary to calcified disc bulge.  There are foraminal narrowing is most evident on the right at T9-T12 and on the left at T10-T12.    The paraspinal soft tissues are unremarkable.                                       MRI Lumbar Spine Without Contrast (Final result)  Result time 03/11/24 18:49:52      Final result by Aye Nath MD (03/11/24 18:49:52)                   Impression:      1. Limited evaluation.  2. Transitional lumbosacral anatomy.  3. Suspected moderate canal stenosis at L1-L4.  4. Multilevel neural foraminal stenoses as described.  5. L2-L3 disc edema with adjacent endplate sclerosis, favored to be degenerative.      Electronically signed by: Aye Nath  Date:    03/11/2024  Time:    18:49               Narrative:    EXAMINATION:  MRI LUMBAR SPINE WITHOUT CONTRAST    CLINICAL HISTORY:  Low back pain, progressive neurologic deficit;    TECHNIQUE:  Multiplanar multisequence MR images of the lumbar spine are obtained without contrast.    COMPARISON:  CT chest, abdomen and pelvis dated 03/09/2024    FINDINGS:  Evaluation is limited secondary to motion artifact.  There are 5 non-rib-bearing lumbar type vertebral bodies with a transitional type S1 vertebral body.  There is a mild rightward curvature of the lumbar spine with grade 1 retrolisthesis L2 over L3.  There is L2-L3  disc edema with adjacent endplate sclerosis.  There is no definite bone marrow edema.  The vertebral heights are preserved.    The conus terminates at the level of L1-L2.  It is not well visualized appears normal in contour.    Disc spaces, spinal canal and neural foramina are as follows:    L1-L2: Disc bulge and facet hypertrophy with moderate narrowing of the spinal canal.  Mild-to-moderate bilateral foraminal stenosis.    L2-L3: Grade 1 retrolisthesis of L2 over L3 and facet hypertrophy with moderate narrowing of the spinal canal.  Moderate right and severe left neural foraminal stenosis.    L3-L4: Disc bulge and facet hypertrophy with moderate narrowing of the spinal canal.  Moderate bilateral foraminal stenosis.    L4-L5: Disc bulge and facet hypertrophy with mild narrowing of the spinal canal.  Moderate right and mild left neural foraminal stenosis.    L5-S1: Disc bulge and facet hypertrophy.  No significant spinal canal stenosis.  Mild bilateral neural foraminal stenosis.    There is mild edema in the right posterior paraspinal soft tissues at L3-L4.                                       MRI Brain Without Contrast (Final result)  Result time 03/11/24 18:43:38      Final result by Gisselle Oneill MD (03/11/24 18:43:38)                   Impression:      No abnormality seen      Electronically signed by: Moncho Oneill  Date:    03/11/2024  Time:    18:43               Narrative:    EXAMINATION:  MRI BRAIN WITHOUT CONTRAST    CLINICAL HISTORY:  Dizziness, non-specific;    TECHNIQUE:  Multiplanar multisequence MR imaging of the brain was performed without contrast.    COMPARISON:  03/09/2024    FINDINGS:  No intracranial mass or lesion is seen.  No hemorrhage is seen.  No acute infarct is seen.  No diffusion abnormality seen.  No parenchymal abnormality is seen.  Gyri and sulci appear normal.  Ventricles and basilar cisterns appear grossly unremarkable.  No abnormal white matter changes are seen..   Posterior fossa appears normal.  Calvarium is intact.  Paranasal sinuses appear grossly unremarkable.                                       CT Cervical Spine Without Contrast (Final result)  Result time 03/09/24 10:26:23      Final result by Natanael Pereira MD (03/09/24 10:26:23)                   Narrative:    EXAMINATION  CT CERVICAL SPINE WITHOUT CONTRAST    CLINICAL HISTORY  Neck pain, chronic;    TECHNIQUE  Non-contrast helical-acquisition CT images of the cervical spine were obtained and multiplanar reformats accomplished by a CT technologist at a separate workstation, pushed to PACS for physician review.    COMPARISON  22 December 2022    FINDINGS  Images were reviewed in bone, soft tissue, and lung windows.    Exam quality: adequate for evaluation    Visualized levels: Skull base through T2 upper endplate.    Vertebral segments: No displaced fracture visualized. No acute compression deformity or focal vertebral body abnormality. The C1 lateral masses are symmetrically aligned on C2.  No evidence of traumatic subluxation. There are similar degenerative endplate changes and associated osteophytic projections, as well as multilevel bilateral facet arthrosis.  Chronic/degenerative grade 1 anterolisthesis of C3 on C4 and C7 on T1 also unchanged.    Disc spaces: Multilevel intervertebral narrowing is present. No acute process identified.    Curvature: No grossly abnormal curvature appreciated.    Paravertebral tissue/musculature: No thickening or focal contour irregularity. The paraspinal musculature and overlying subcutaneous tissues demonstrate no acute or focal lesion.    Other findings: The visualized thyroid tissue is unremarkable. Scattered vascular calcifications are present. The included upper lung zones and mediastinal vasculature are without focal abnormality. No acute or suspicious focal abnormality of the included brain and skull base.    IMPRESSION  1. No evidence of acute osseous displacement or  traumatic malalignment.  2. Degenerative changes and other nonacute secondary details discussed above, similar in the interval.  ==========    Please note ligament, spinal cord, and/or vascular abnormalities cannot be excluded on the basis of this examination.  Additional imaging recommended if there is elevated clinical concern for high-grade soft tissue injury.    RADIATION DOSE  Automated tube current modulation, weight-based exposure dosing, and/or iterative reconstruction technique utilized to reach lowest reasonably achievable exposure rate.    DLP: 407 mGy*cm      Electronically signed by: Natanael Pereira  Date:    03/09/2024  Time:    10:26                                     X-Ray Hand 3 view Right (Final result)  Result time 03/09/24 10:28:23      Final result by Chevy Santillan MD (03/09/24 10:28:23)                   Impression:      No displaced fracture      Electronically signed by: Chevy Santillan MD  Date:    03/09/2024  Time:    10:28               Narrative:    EXAMINATION:  Three views right hand    CLINICAL HISTORY:  Fall    COMPARISON:  06/09/2015    FINDINGS:  No displaced fracture or dislocation identified.  No radiopaque foreign body.                                       X-Ray Hand 3 view Left (Final result)  Result time 03/09/24 10:29:29   Procedure changed from X-Ray Hand 2 View Bilat     Final result by Chevy Santillan MD (03/09/24 10:29:29)                   Impression:      No acute osseous findings      Electronically signed by: Chevy Santillan MD  Date:    03/09/2024  Time:    10:29               Narrative:    EXAMINATION:  Three views left hand    CLINICAL HISTORY:  Fall    COMPARISON:  01/01/2021    FINDINGS:  Punctate radiodense foreign body near the little finger distal tuft is unchanged.  No acute fracture or dislocation.                                       CT Chest Abdomen Pelvis Without Contrast (XPD) (Final result)  Result time 03/09/24 10:58:22   Procedure changed from CT Chest  Without Contrast     Final result by Bobby Maguire MD (03/09/24 10:58:22)                   Impression:      1. No acute abnormality identified within the chest.  2. Emphysematous changes are present.  3. Urinary bladder wall thickening may reflect under distension, cystitis, or chronic hypertrophic changes related to prostatomegaly.  4. Fluid-filled distended stomach.  5. Moderate colonic stool with ample colonic gas.  6. [place] concordance.      Electronically signed by: Bobby Maguire MD  Date:    03/09/2024  Time:    10:58               Narrative:    EXAMINATION:  CT CHEST ABDOMEN PELVIS WITHOUT CONTRAST(XPD)    CLINICAL HISTORY:  Sepsis.    TECHNIQUE:  Axial CT images of the chest were obtained without intravenous contrast.  Coronal and sagittal reformations were obtained.  Axial CT images of the abdomen and pelvis were obtained without intravenous contrast.  Coronal and sagittal reformations were obtained. Automated exposure control was utilized. Total exam DLP is 282 mGy cm.    COMPARISON:  CT chest 09/05/2021, CT abdomen and pelvis 10/22/2022    FINDINGS:  CHEST:    Absence of intravenous contrast limits evaluation.  Atherosclerotic calcifications are noted.  Thoracic aorta is not aneurysmal.  There is trace pericardial fluid.  There is no lymphadenopathy appreciated.  There is no pleural effusion demonstrated.  Centrilobular emphysematous changes are present.  There is a 5 mm ground-glass nodule within the left upper lobe image 34 of series 4.  There is dependent atelectasis.  There is no consolidation or pneumothorax identified.  No acute displaced fractures identified.    ABDOMEN AND PELVIS:    Absence of intravenous contrast limits evaluation.  There is markedly distended stomach which is fluid-filled.  The non contrasted liver, gallbladder, pancreas, spleen, and adrenal glands appear grossly unremarkable.  Kidneys demonstrate no hydronephrosis or nephrolithiasis.  Atherosclerotic calcifications are  noted.  Abdominal aorta is not aneurysmal.  There is moderate colonic stool.  There is ample colonic gas present.  There is no evidence of acute appendicitis identified.  Prostate is enlarged.  Coarse calcifications are noted within.  Urinary bladder demonstrates mild wall thickening likely related to under distension.  Differential would include cystitis or chronic hypertrophic changes related to enlarged prostate.  No intraperitoneal free air or free fluid appreciated.  No definite lymphadenopathy identified.  No acute displaced fracture noted.                        Preliminary result by Interface, Rad Results In (03/09/24 03:24:42)                   Impression:    1. The bladder is nondistended however the bladder wall appears thickened after considering state of nondistension which could reflect an element of cystitis.  2. There are emphysematous changes in the bilateral lungs, more pronounced in the bilateral upper lobes. A 4 cm bulla is seen in the anterior right upper lobe along the restrosternal region. There is a 4 mm ground glass nodule in the left lower lobe best visualized on series 4 image 66.  3. The stomach is markedly fluid distended.  4. The 3rd duodenal segment is not well delineated. Possibility of partial bowel obstruction secondary to SMA syndrome is not excluded. Correlate with clinical and laboratory findings as regards additional evaluation and follow-up to full resolution as indicated.  5. Details and other findings as discussed above.               Narrative:    START OF REPORT:  Technique: CT Scan of the chest abdomen and pelvis was performed without intravenous contrast with axial as well as sagittal and, coronal images.    Dosage Information: Automated Exposure Control was utilized.    Comparison: Comparison is with abdomen study dated 2022-10-22 09:44:39. Comparison is with chest study dated 2021-09-05 11:05:57.    Clinical History: Sepsis.    Findings:  Soft Tissues:  Unremarkable.  Lines and Tubes: None.  Neck: The visualized soft tissues of the neck appear unremarkable.  Mediastinum: The mediastinal structures are within normal limits.  Heart: The heart size is within normal limits. Mild coronary artery calcification is seen.  Aorta: Unremarkable appearing aorta.  Lungs: There is mild non specific dependent change at the lung bases. There are emphysematous changes in the bilateral lungs, more pronounced in the bilateral upper lobes. A 4 cm bulla is seen in the anterior right upper lobe along the restrosternal region. There is a 4 mm ground glass nodule in the left lower lobe best visualized on series 4 image 66.  Pleura: No effusions or pneumothorax are identified.  Bony Structures: Mild degenerative changes are seen in the right shoulder joint.  Spine: Mild spondylolytic changes are seen in the thoracic spine.  Ribs: The ribs appear unremarkable.  Abdomen:  Abdominal Wall: No abdominal wall pathology is seen.  Liver: The liver appears unremarkable.  Biliary System: No intrahepatic or extrahepatic biliary duct dilatation is seen.  Gallbladder: The gallbladder appears unremarkable.  Pancreas: The pancreas appears unremarkable.  Adrenals: The adrenal glands appear unremarkable.  Kidneys: The kidneys appear unremarkable with no stones cysts masses or hydronephrosis.  Aorta: There is moderate calcification of the.  Bowel:  Esophagus: The visualized esophagus appears unremarkable.  Stomach: The stomach is markedly fluid distended.  Duodenum: The 3rd duodenal segment is not well delineated.  Appendix: The appendix is not identified but no inflammatory changes are seen in the right lower quadrant to suggest appendicitis.  Peritoneum: No intraperitoneal free air or ascites is seen.    Pelvis:  Bladder: The bladder is nondistended however the bladder wall appears thickened after considering state of nondistension which could reflect an element of cystitis.  Male:  Prostate gland: The  prostate gland is mildly enlarged. There are numerous calcifications prostate gland.    Bony structures:  Dorsal Spine: There is moderate spondylosis of the visualized dorsal spine. There is grade I retrolisthesis of L1 over L2. There is severe disc space narrowing at L1-L2 with pronounced sclerosis of the apposing endplates.  Bony Pelvis: The visualized bony structures of the pelvis appear unremarkable.                                         CT Head Without Contrast (Final result)  Result time 03/09/24 09:56:13      Final result by Bobby Maguire MD (03/09/24 09:56:13)                   Impression:      1. No acute intracranial abnormalities identified.  2. [employer] concordance.      Electronically signed by: Bobby Maguire MD  Date:    03/09/2024  Time:    09:56               Narrative:    EXAMINATION:  CT HEAD WITHOUT CONTRAST    CLINICAL HISTORY:  Slurred speech, left facial numbness.    TECHNIQUE:  Axial CT images were obtained of the brain without intravenous contrast.  Coronal and sagittal reformations were obtained.  Automated exposure control utilized to reduce radiation dose.  Total exam DLP is 975 mGy cm.    COMPARISON:  08/28/2012    FINDINGS:  Gray-white matter differentiation is within normal limits. There is chronic involutional change.  There is chronic white matter microischemic change.  There is minimal intracranial atherosclerosis.  No acute intracranial hemorrhage, extra-axial fluid collection, hydrocephalus, mass effect, or midline shift is noted.  No large vessel territory acute ischemia is identified.  Visualized paranasal sinuses are clear.  Visualized mastoid air cells are sclerotic bilaterally could reflect sequela of chronic mastoiditis change.  No acute displaced calvarial fracture is identified.                        Preliminary result by Interface, Rad Results In (03/09/24 03:17:53)                   Impression:    1. No acute intracranial process identified. Details and other  findings as noted above.               Narrative:    START OF REPORT:  Technique: CT of the head was performed without intravenous contrast with axial as well as coronal and sagittal images.    Comparison: Comparison is with study [id number] 17:28:39.    Dosage Information: Automated exposure control was utilized.    Clinical history: Cerebrovascular Accident (Pt arrives via [facility], EMS reports pt last seen normal at 0000, spouse reported to EMS pt speech slurred, numbness in the left face. On arrival pt has mild slurred speech, no facial asymmetry, no decreased strength, decreased sensation to the RLE.    Findings:  Hemorrhage: No acute intracranial hemorrhage is seen.  CSF spaces: The ventricles, sulci and basal cisterns all appear somewhat prominent suggesting an element of global cerebral atrophy.  Brain parenchyma: There is preservation of the grey white junction throughout. No acute infarct. Subtle scattered microvascular change is seen in portions of the periventricular and deep white matter tracts.  Cerebellum: Unremarkable.  Vascular: Unremarkable venous sinuses.  Sella and skull base: The sella appears to be within normal limits for age.  Cerebellopontine angles: Within normal limits.  Herniation: None.  Intracranial calcifications: Incidental note is made of bilateral choroid plexus calcification. Incidental note is made of some pineal region calcification.  Calvarium: No acute linear or depressed skull fracture is seen.    Maxillofacial Structures:  Paranasal sinuses: The visualized paranasal sinuses appear clear with no mucoperiosteal thickening or air fluid levels identified.  Orbits: The orbits appear unremarkable.  Zygomatic arches: The zygomatic arches are intact and unremarkable.  Temporal bones and mastoids: The bilateral somewhat sclerotic suggesting chronic mastoiditis.  TMJ: The mandibular condyles appear normally placed with respect to the mandibular fossa.                                          X-Ray Chest AP Portable (Final result)  Result time 03/09/24 10:27:35      Final result by Chevy Santillan MD (03/09/24 10:27:35)                   Impression:      No acute findings in the chest      Electronically signed by: Chevy Santillan MD  Date:    03/09/2024  Time:    10:27               Narrative:    EXAMINATION:  XR CHEST AP PORTABLE    CLINICAL HISTORY:  Respiratory failure, unspecified, unspecified whether with hypoxia or hypercapnia    COMPARISON:  03/05/2024    FINDINGS:  Single view of the chest shows improved central aeration compared to the prior study without focal consolidation, pneumothorax or pleural effusion.  Cardiac silhouette and pulmonary vasculature are normal.                                       Lab Review   Recent Results (from the past 24 hour(s))   Lactic Acid, Plasma    Collection Time: 03/13/24  2:31 PM   Result Value Ref Range    Lactic Acid Level 1.6 0.5 - 2.2 mmol/L   CK    Collection Time: 03/13/24  2:31 PM   Result Value Ref Range    Creatine Kinase 299 (H) 30 - 200 U/L   Magnesium    Collection Time: 03/14/24  4:09 AM   Result Value Ref Range    Magnesium Level 2.00 1.60 - 2.60 mg/dL   Comprehensive Metabolic Panel    Collection Time: 03/14/24  4:09 AM   Result Value Ref Range    Sodium Level 138 136 - 145 mmol/L    Potassium Level 3.0 (L) 3.5 - 5.1 mmol/L    Chloride 96 (L) 98 - 107 mmol/L    Carbon Dioxide 32 (H) 22 - 29 mmol/L    Glucose Level 140 (H) 74 - 100 mg/dL    Blood Urea Nitrogen 39.0 (H) 8.4 - 25.7 mg/dL    Creatinine 1.08 0.73 - 1.18 mg/dL    Calcium Level Total 7.3 (L) 8.4 - 10.2 mg/dL    Protein Total 4.9 (L) 6.4 - 8.3 gm/dL    Albumin Level 1.5 (L) 3.5 - 5.0 g/dL    Globulin 3.4 2.4 - 3.5 gm/dL    Albumin/Globulin Ratio 0.4 (L) 1.1 - 2.0 ratio    Bilirubin Total 1.6 (H) <=1.5 mg/dL    Alkaline Phosphatase 134 40 - 150 unit/L    Alanine Aminotransferase 136 (H) 0 - 55 unit/L    Aspartate Aminotransferase 148 (H) 5 - 34 unit/L    eGFR >60 mls/min/1.73/m2    CBC with Differential    Collection Time: 03/14/24  4:09 AM   Result Value Ref Range    WBC 15.29 (H) 4.50 - 11.50 x10(3)/mcL    RBC 3.54 (L) 4.70 - 6.10 x10(6)/mcL    Hgb 10.6 (L) 14.0 - 18.0 g/dL    Hct 31.7 (L) 42.0 - 52.0 %    MCV 89.5 80.0 - 94.0 fL    MCH 29.9 27.0 - 31.0 pg    MCHC 33.4 33.0 - 36.0 g/dL    RDW 14.8 11.5 - 17.0 %    Platelet 65 (L) 130 - 400 x10(3)/mcL    MPV 12.5 (H) 7.4 - 10.4 fL    Neut % 90.1 %    Lymph % 4.2 %    Mono % 2.0 %    Eos % 0.3 %    Basophil % 0.5 %    Lymph # 0.64 0.6 - 4.6 x10(3)/mcL    Neut # 13.79 (H) 2.1 - 9.2 x10(3)/mcL    Mono # 0.30 0.1 - 1.3 x10(3)/mcL    Eos # 0.04 0 - 0.9 x10(3)/mcL    Baso # 0.08 <=0.2 x10(3)/mcL    IG# 0.44 (H) 0 - 0.04 x10(3)/mcL    IG% 2.9 %    NRBC% 0.0 %             Assessment/Plan:  1. Streptococcus dysgalactiae sepsis  2.  Left lower extremity cellulitis  3. Acute kidney injury   4. Transaminitis   5.  Rhabdomyolysis  6. Small bowel obstruction/colonic ileus  7.  Anasarca/ascites/bilateral pleural effusions   8.  Polysubstance abuse  9.  Anemia       -We will continue antibiotic coverage with ceftriaxone, avoiding potential nephrotoxicity of Zosyn and vancomycin  -No fevers and leukocytosis trending down, follow  -3/6 and 3/9 blood cultures with Streptococcus dysgalactiae; 3/12 blood cultures negative so far, follow  -Needs a FREEMAN to rule out endocarditis, especially considering findings which could be thromboembolic/ischemia related  -Left lower extremity blister with some erythema though overall not very impressive and has generalized edema with anasarca, pleural effusion and ascites  -Elevated CPK, transaminitis and lactic acidosis trending down  -Renal impairment noted with creatinine trending down, follow   -Findings of partial small bowel obstruction and ileus concerning, surgery team consulted, follow   -Hepatitis-B serology positive and follow with HCV quantitative RNA.  -Discussed with patient, wife and with nursing staff.

## 2024-03-15 PROBLEM — E44.0 MODERATE MALNUTRITION: Status: ACTIVE | Noted: 2024-03-15

## 2024-03-15 LAB
ALBUMIN SERPL-MCNC: 1.3 G/DL (ref 3.5–5)
ALBUMIN/GLOB SERPL: 0.4 RATIO (ref 1.1–2)
ALP SERPL-CCNC: 101 UNIT/L (ref 40–150)
ALT SERPL-CCNC: 84 UNIT/L (ref 0–55)
AST SERPL-CCNC: 87 UNIT/L (ref 5–34)
BASOPHILS # BLD AUTO: 0.08 X10(3)/MCL
BASOPHILS NFR BLD AUTO: 0.4 %
BILIRUB SERPL-MCNC: 1.8 MG/DL
BUN SERPL-MCNC: 32.6 MG/DL (ref 8.4–25.7)
CALCIUM SERPL-MCNC: 7.2 MG/DL (ref 8.4–10.2)
CHLORIDE SERPL-SCNC: 97 MMOL/L (ref 98–107)
CO2 SERPL-SCNC: 33 MMOL/L (ref 22–29)
CREAT SERPL-MCNC: 0.99 MG/DL (ref 0.73–1.18)
EOSINOPHIL # BLD AUTO: 0.04 X10(3)/MCL (ref 0–0.9)
EOSINOPHIL NFR BLD AUTO: 0.2 %
ERYTHROCYTE [DISTWIDTH] IN BLOOD BY AUTOMATED COUNT: 14.6 % (ref 11.5–17)
GFR SERPLBLD CREATININE-BSD FMLA CKD-EPI: >60 MLS/MIN/1.73/M2
GLOBULIN SER-MCNC: 3.4 GM/DL (ref 2.4–3.5)
GLUCOSE SERPL-MCNC: 112 MG/DL (ref 74–100)
HCT VFR BLD AUTO: 32.4 % (ref 42–52)
HCV RNA SERPL NAA+PROBE-ACNC: NORMAL IU/ML
HGB BLD-MCNC: 10.9 G/DL (ref 14–18)
IMM GRANULOCYTES # BLD AUTO: 0.37 X10(3)/MCL (ref 0–0.04)
IMM GRANULOCYTES NFR BLD AUTO: 2.1 %
LYMPHOCYTES # BLD AUTO: 0.53 X10(3)/MCL (ref 0.6–4.6)
LYMPHOCYTES NFR BLD AUTO: 3 %
MAGNESIUM SERPL-MCNC: 1.8 MG/DL (ref 1.6–2.6)
MCH RBC QN AUTO: 30.4 PG (ref 27–31)
MCHC RBC AUTO-ENTMCNC: 33.6 G/DL (ref 33–36)
MCV RBC AUTO: 90.5 FL (ref 80–94)
MONOCYTES # BLD AUTO: 0.33 X10(3)/MCL (ref 0.1–1.3)
MONOCYTES NFR BLD AUTO: 1.9 %
NEUTROPHILS # BLD AUTO: 16.44 X10(3)/MCL (ref 2.1–9.2)
NEUTROPHILS NFR BLD AUTO: 92.4 %
NRBC BLD AUTO-RTO: 0 %
PHOSPHATE SERPL-MCNC: 3.4 MG/DL (ref 2.3–4.7)
PLATELET # BLD AUTO: 92 X10(3)/MCL (ref 130–400)
PLATELETS.RETICULATED NFR BLD AUTO: 18 % (ref 0.9–11.2)
PMV BLD AUTO: 14 FL (ref 7.4–10.4)
POCT GLUCOSE: 128 MG/DL (ref 70–110)
POTASSIUM SERPL-SCNC: 3.7 MMOL/L (ref 3.5–5.1)
PROT SERPL-MCNC: 4.7 GM/DL (ref 6.4–8.3)
RBC # BLD AUTO: 3.58 X10(6)/MCL (ref 4.7–6.1)
SODIUM SERPL-SCNC: 138 MMOL/L (ref 136–145)
WBC # SPEC AUTO: 17.79 X10(3)/MCL (ref 4.5–11.5)

## 2024-03-15 PROCEDURE — 83735 ASSAY OF MAGNESIUM: CPT | Performed by: INTERNAL MEDICINE

## 2024-03-15 PROCEDURE — 63600175 PHARM REV CODE 636 W HCPCS: Performed by: INTERNAL MEDICINE

## 2024-03-15 PROCEDURE — 93005 ELECTROCARDIOGRAM TRACING: CPT

## 2024-03-15 PROCEDURE — 25000003 PHARM REV CODE 250: Performed by: INTERNAL MEDICINE

## 2024-03-15 PROCEDURE — 99900035 HC TECH TIME PER 15 MIN (STAT)

## 2024-03-15 PROCEDURE — 27000221 HC OXYGEN, UP TO 24 HOURS

## 2024-03-15 PROCEDURE — 21400001 HC TELEMETRY ROOM

## 2024-03-15 PROCEDURE — 93010 ELECTROCARDIOGRAM REPORT: CPT | Mod: ,,, | Performed by: INTERNAL MEDICINE

## 2024-03-15 PROCEDURE — 94760 N-INVAS EAR/PLS OXIMETRY 1: CPT

## 2024-03-15 PROCEDURE — 85025 COMPLETE CBC W/AUTO DIFF WBC: CPT | Performed by: INTERNAL MEDICINE

## 2024-03-15 PROCEDURE — 80053 COMPREHEN METABOLIC PANEL: CPT | Performed by: INTERNAL MEDICINE

## 2024-03-15 PROCEDURE — 84100 ASSAY OF PHOSPHORUS: CPT | Performed by: INTERNAL MEDICINE

## 2024-03-15 RX ADMIN — CEFTRIAXONE SODIUM 2 G: 2 INJECTION, POWDER, FOR SOLUTION INTRAMUSCULAR; INTRAVENOUS at 04:03

## 2024-03-15 RX ADMIN — DEXTROSE AND SODIUM CHLORIDE: 5; 900 INJECTION, SOLUTION INTRAVENOUS at 04:03

## 2024-03-15 RX ADMIN — NYSTATIN 500000 UNITS: 100000 SUSPENSION ORAL at 04:03

## 2024-03-15 RX ADMIN — NYSTATIN 500000 UNITS: 100000 SUSPENSION ORAL at 09:03

## 2024-03-15 RX ADMIN — NYSTATIN 500000 UNITS: 100000 SUSPENSION ORAL at 08:03

## 2024-03-15 RX ADMIN — LEUCINE, PHENYLALANINE, LYSINE, METHIONINE, ISOLEUCINE, VALINE, HISTIDINE, THREONINE, TRYPTOPHAN, ALANINE, GLYCINE, ARGININE, PROLINE, SERINE, TYROSINE, SODIUM ACETATE, DIBASIC POTASSIUM PHOSPHATE, MAGNESIUM CHLORIDE, SODIUM CHLORIDE, CALCIUM CHLORIDE, DEXTROSE
311; 238; 247; 170; 255; 247; 204; 179; 77; 880; 438; 489; 289; 213; 17; 297; 261; 51; 77; 33; 5 INJECTION INTRAVENOUS at 08:03

## 2024-03-15 RX ADMIN — HYDROMORPHONE HYDROCHLORIDE 0.5 MG: 2 INJECTION INTRAMUSCULAR; INTRAVENOUS; SUBCUTANEOUS at 12:03

## 2024-03-15 NOTE — PROGRESS NOTES
Inpatient Nutrition Assessment    Admit Date: 3/9/2024   Total duration of encounter: 6 days   Patient Age: 54 y.o.    Nutrition Recommendation/Prescription     Recommend increasing rate of PPN to 90ml/hr to better meet protein needs.  Clinimix-E 4.25/5 @ 90 ml/hr would provide:  734 kcal/d, 35% est needs  92 g amino acids/d, 94% est needs  108 g dextrose/d  2160 ml fluid/d, 88% est needs    Oral diet once medically appropriate per MD. Recommended goal diet: soft/low-fiber.     Communication of Recommendations: reviewed with nurse and reviewed with patient    Nutrition Assessment     Malnutrition Assessment/Nutrition-Focused Physical Exam    Malnutrition Context: acute illness or injury (03/15/24 1004)  Malnutrition Level: moderate (03/15/24 1004)  Energy Intake (Malnutrition): less than or equal to 50% for greater than or equal to 5 days (03/15/24 1004)  Weight Loss (Malnutrition):  (does not meet criteria) (03/15/24 1004)  Subcutaneous Fat (Malnutrition):  (does not meet criteria) (03/15/24 1004)           Muscle Mass (Malnutrition): mild depletion (03/15/24 1004)  Rastafari Region (Muscle Loss): mild depletion     Clavicle and Acromion Bone Region (Muscle Loss): mild depletion                 Fluid Accumulation (Malnutrition): mild (03/15/24 1004)        A minimum of two characteristics is recommended for diagnosis of either severe or non-severe malnutrition.    Chart Review    Reason Seen: continuous nutrition monitoring    Malnutrition Screening Tool Results   Have you recently lost weight without trying?: No  Have you been eating poorly because of a decreased appetite?: No   MST Score: 0   Diagnosis:  Severe sepsis secondary to Streptococcus dysgalactiae   Left lower extremity cellulitis   Acute hypoxemic respiratory failure secondary to bilateral pleural effusions  Possible partial bowel obstruction secondary to SMA syndrome by ct abd  OKSANA - resolved  Lactic acidosis- resolved  Rhabdomyolysis-  resolved  Transaminitis- trending down slowly  Leukocytosis- persist  Hyponatremia/Hypochloremia - resolved  Severe hypokalemia - persist  Hypocalcemia -persist  Intractable hypoglycemia- resolved  Abd pain / small bowel obstruction /ileus  Ascites/anasarca  Thrombocytopenia  Polysubstance abuse-UDS positive for amphetamine, fentanyl, cannabinoids  Tobacco abuse   Chronic back pain    Relevant Medical History: polysubstance abuse and chronic back pain     Scheduled Medications:  cefTRIAXone (Rocephin) IV (PEDS and ADULTS), 2 g, Q24H  folic acid, 1 mg, Daily  nystatin, 500,000 Units, QID    Continuous Infusions:  Amino acid 4.25% - dextrose 5% (CLINIMIX-E) solution (1L provides 42.5 gm AA, 50 gm CHO (170 kcal/L dextrose), Na 35, K 30, Mg 5, Ca 4.5, Acetate 70, Cl 39, Phos 15), Last Rate: 65 mL/hr at 03/14/24 1255  dextrose 5 % and 0.9 % NaCl, Last Rate: 60 mL/hr at 03/14/24 1140    PRN Medications: acetaminophen, aluminum-magnesium hydroxide-simethicone, glucagon (human recombinant), HYDROmorphone, levalbuterol, melatonin, ondansetron, polyethylene glycol, prochlorperazine, senna-docusate 8.6-50 mg, sodium chloride 0.9%    Calorie Containing IV Medications: Clinimix    Recent Labs   Lab 03/09/24  0129 03/09/24  0844 03/09/24  1804 03/10/24  0547 03/10/24  0932 03/11/24  0422 03/11/24  0833 03/12/24  0353 03/12/24  1854 03/13/24  0414 03/14/24  0409 03/15/24  0417   * 128* 125* 127*  --  130*  --  135* 133* 136 138 138   K 5.2* 4.3 3.8 3.9  --  3.5  --  2.7* 2.9* 3.0* 3.0* 3.7   CALCIUM 7.9* 7.2* 6.9* 6.2*  --  6.8*  --  7.2* 8.0* 7.3* 7.3* 7.2*   PHOS  --   --   --   --   --   --   --   --   --   --   --  3.4   MG 2.60  --  2.00  --   --   --   --  2.30  --   --  2.00 1.80   CHLORIDE 91* 93* 89* 91*  --  89*  --  91* 90* 94* 96* 97*   CO2 11* 14* 17* 20*  --  26  --  31* 31* 32* 32* 33*   BUN 67.5* 69.0* 71.0* 77.5*  --  75.1*  --  62.1* 59.7* 56.1* 39.0* 32.6*   CREATININE 3.82* 3.22* 3.00* 2.64*  --  2.08*   "--  1.51* 1.58* 1.34* 1.08 0.99   EGFRNORACEVR 18 22 24 28  --  38  --  55 52 >60 >60 >60   GLUCOSE 80 99 136* 63* 65* 115*  --  118* 132* 110* 140* 112*   BILITOT 1.4 1.3 1.3 1.6*  --  1.5  --  1.5  --  1.3 1.6* 1.8*   ALKPHOS 80 66 54 45  --  50  --  87  --  121 134 101   * 584* 654* 586*  --  410*  --  283*  --  189* 136* 84*   * 1,040* 1,140* 863*  --  494*  --  313*  --  209* 148* 87*   ALBUMIN 2.7* 2.2* 1.9* 1.8*  --  1.6*  --  1.5*  --  1.4* 1.5* 1.3*   CRP  --  417.90*  --   --   --   --   --   --   --   --   --   --    WBC 18.15  18.15* 8.33  8.33 6.68  6.68 6.33  6.33  --   --  14.75  14.75* 19.56  19.56*  --  16.71  16.71* 15.29* 17.79*   HGB 15.9 16.0 15.0 14.1  --   --  12.8* 11.5*  --  10.6* 10.6* 10.9*   HCT 46.7 46.4 42.8 39.8*  --   --  34.8* 31.9*  --  31.1* 31.7* 32.4*     Nutrition Orders:  Diet NPO Except for: Ice Chips  Amino acid 4.25% - dextrose 5% (CLINIMIX-E) solution (1L provides 42.5 gm AA, 50 gm CHO (170 kcal/L dextrose), Na 35, K 30, Mg 5, Ca 4.5, Acetate 70, Cl 39, Phos 15)      Appetite/Oral Intake: NPO/NPO  Factors Affecting Nutritional Intake: altered gastrointestinal function and NPO  Food/Jewish/Cultural Preferences: none reported  Food Allergies: none reported  Last Bowel Movement: 03/11/24  Wound(s):     Altered Skin Integrity 03/11/24 0800 Coccyx Intact skin with non-blanchable redness of localized area-Tissue loss description: Not applicable       Altered Skin Integrity 03/12/24 0800 Left anterior;lower Leg Blister(s)-Tissue loss description: Partial thickness     Comments    3/15/24 Pt on clear liquids/NPO since 3/11/24. Started on PPN with electrolytes yesterday s/t ileus, has NG to suction. Pt states feeling a bit better today. UBW ~180lbs, denies any unintentional wt loss. Noted mild muscle wasting. Left recommendations above to increase PPN to better protein needs while NPO.     Anthropometrics    Height: 5' 10" (177.8 cm),    Last Weight: 81.6 kg " (180 lb) (24), Weight Method: Standard Scale  BMI (Calculated): 25.8  BMI Classification: overweight (BMI 25-29.9)        Ideal Body Weight (IBW), Male: 166 lb     % Ideal Body Weight, Male (lb): 108.43 %                 Usual Body Weight (UBW), k.8 kg  % Usual Body Weight: 100.02     Usual Weight Provided By: patient denies unintentional weight loss    Wt Readings from Last 5 Encounters:   24 81.6 kg (180 lb)   24 83.9 kg (185 lb)   23 83.9 kg (185 lb)   22 76 kg (167 lb 8.8 oz)   22 81.6 kg (180 lb)     Weight Change(s) Since Admission:   Wt Readings from Last 1 Encounters:   24 81.6 kg (180 lb)   24 81.6 kg (180 lb)   Admit Weight: 81.6 kg (180 lb) (24), Weight Method: Stated    Estimated Needs    Weight Used For Calorie Calculations: 81.6 kg (179 lb 14.3 oz)  Energy Calorie Requirements (kcal): 2040-2448kcals/d (25-30kcals/kg)  Energy Need Method: Kcal/kg  Weight Used For Protein Calculations: 81.6 kg (179 lb 14.3 oz)  Protein Requirements: 98g/d (1.2g/kg)  Fluid Requirements (mL): 2448ml fl/d (30ml/kg)    Enteral Nutrition     Patient not receiving enteral nutrition at this time.    Parenteral Nutrition     Standard Formula: Clinimix E 4.25/5  Custom Formula: not applicable  Additives: none  Rate/Volume: 65mL/hr  Lipids: none  Total Nutrition Provided by Parenteral Nutrition:  Calories Provided  530 kcal/d, 26% needs   Protein Provided  66 g/d, 67% needs   Dextrose Provided  78 g/d, GIR 0.66 mg CHO/kg/min   Fluid Provided  1560 ml/d, 63% needs     Evaluation of Received Nutrient Intake    Calories: not meeting estimated needs  Protein: not meeting estimated needs    Patient Education     Not applicable.    Nutrition Diagnosis     PES: Inadequate energy intake related to acute illness as evidenced by NPO/clear liquids since 3/11/24. (new)     PES: Moderate acute disease or injury related malnutrition related to acute illness as  evidenced by less than or equal to 50% needs met for greater than or equal to 5 days, mild muscle depletion, and edema. (new)    Nutrition Interventions     Intervention(s): general/healthful diet, modified rate of parenteral nutrition, and collaboration with other providers    Goal: Meet greater than 80% of nutritional needs by follow-up. (new)  Goal: Maintain weight throughout hospitalization. (new)    Nutrition Goals & Monitoring     Dietitian will monitor: energy intake, weight change, electrolyte/renal panel, and glucose/endocrine profile    Nutrition Risk/Follow-Up: high (follow-up in 1-4 days)   Please consult if re-assessment needed sooner.

## 2024-03-15 NOTE — PROGRESS NOTES
Infectious Diseases Progress Note  54-year-old male with past medical history of basal cell carcinoma, polysubstance abuse and chronic back pain , seen at the ED at this same facility, [facility], on 03/06/2024, diagnosed with a pneumonia and discharged on Augmentin and azithromycin, is admitted this time on 03/09/2024, presenting via EMS to the ED with complaints of generalized weakness and numbness to fingers and toes, slurred speech and numbness to left side.  On presentation he was noted to have no fevers and no leukocytosis, thrombocytopenic, anemic, elevated AST of 120, , abnormal renal function with creatinine of 3.0.  CPK 15,967 and lactate 5.9.  Urine toxicology test positive for amphetamines, fentanyl and cannabis.  Hepatitis-C positive.  Blood cultures with Streptococcus dysgalactiae in 2 of 2 sets and a repeat blood culture from today 3/12 pending.  Urinalysis abnormal with 21-50 WBC, moderate bacteria but negative LE.  Review of his records show a 3/6 blood cultures from recent ED visit with Streptococcus dysgalactiae in 2 of 2 sets.  2D echocardiogram with valves not seen well.  Chest x-ray with no acute cardiopulmonary disease.  CT scan of the chest abdomen and pelvis with no acute abnormality identified in the chest, emphysematous changes present, urinary bladder wall thickening may reflect under distention, cystitis of chronic hypertrophic changes related to prostatomegaly, moderate colonic stool with ample gas.  Bilateral hand x-ray CT scan of C-spine, with no acute changes.  CT head with no acute abnormalities.  MRI of the brain and C-spine with no acute changes.  MRI of the lumbar spine with suspected moderate canal stenosis at L1-L4, multilevel neural foraminal stenosis, L2-L3 disc edema with adjacent endplate sclerosis favored to be degenerative.  Repeat CT scan of the abdomen and pelvis done today 3/13 with findings of partial small bowel obstruction,  colonic ileus, large volume ascites, anasarca, edema, bibasilar atelectasis and bilateral pleural effusions.  He was on antibiotic coverage with Zosyn, vancomycin which has been discontinued.   He is on ceftriaxone    Subjective:  No new complaints, no fevers, doing about the same.  Lying in bed in nonacute distress.        Past Medical History:   Diagnosis Date    Basal cell carcinoma     Nose    Cord compression     Herniated disc, cervical     Thoracic spinal stenosis      Past Surgical History:   Procedure Laterality Date    BACK SURGERY      LAMINECTOMY      T10-T12     Social History     Socioeconomic History    Marital status:    Tobacco Use    Smoking status: Every Day     Current packs/day: 1.00     Types: Cigarettes    Smokeless tobacco: Never   Substance and Sexual Activity    Alcohol use: Yes     Alcohol/week: 3.0 standard drinks of alcohol     Types: 3 Cans of beer per week    Drug use: No    Sexual activity: Yes     Partners: Female     Social Determinants of Health     Social Connections: Unknown (3/11/2024)    Social Connection and Isolation Panel [NHANES]     Marital Status: Living with partner       ROS  Constitutional:  Positive for malaise/fatigue.   HENT: Negative.     Respiratory: Negative.     Gastrointestinal:  Positive for abdominal pain.   Genitourinary: Negative.    Musculoskeletal:  Positive for back pain.   Neurological:  Positive for weakness.   Endo/Heme/Allergies: Negative.    Psychiatric/Behavioral: Negative.     All other Systems review done and negative.    Review of patient's allergies indicates:  No Known Allergies      Scheduled Meds:   cefTRIAXone (Rocephin) IV (PEDS and ADULTS)  2 g Intravenous Q24H    folic acid  1 mg Oral Daily    nystatin  500,000 Units Oral QID     Continuous Infusions:   Amino acid 4.25% - dextrose 5% (CLINIMIX-E) solution (1L provides 42.5 gm AA, 50 gm CHO (170 kcal/L dextrose), Na 35, K 30, Mg 5, Ca 4.5, Acetate 70, Cl 39, Phos 15) 65 mL/hr at  "03/14/24 1255    dextrose 5 % and 0.9 % NaCl 60 mL/hr at 03/14/24 1140     PRN Meds:acetaminophen, aluminum-magnesium hydroxide-simethicone, glucagon (human recombinant), HYDROmorphone, levalbuterol, melatonin, ondansetron, polyethylene glycol, prochlorperazine, senna-docusate 8.6-50 mg, sodium chloride 0.9%    Objective:  /73   Pulse 97   Temp 98.8 °F (37.1 °C) (Oral)   Resp 18   Ht 5' 10" (1.778 m)   Wt 81.6 kg (180 lb)   SpO2 98%   BMI 25.83 kg/m²     Physical Exam:   Physical Exam  Vitals reviewed.   Constitutional:       General: He is not in acute distress.  HENT:      Head: Normocephalic and atraumatic.      Mouth/Throat:   Cardiovascular:      Rate and Rhythm: Normal rate and regular rhythm.      Heart sounds: Normal heart sounds.   Pulmonary:      Effort: Pulmonary effort is normal. No respiratory distress.      Breath sounds: Normal breath sounds.   Abdominal:      General: Bowel sounds are normal. There is distension.      Palpations: Abdomen is soft.      Tenderness: There is abdominal tenderness.   Musculoskeletal:         General: No deformity.      Cervical back: Neck supple.      Right lower leg: Edema present.      Left lower leg: Edema present.   Skin:     Findings: No rash.      Comments: Left anterior leg blister with some swelling and erythema.  Coccygeal stage I pressure injury   Neurological:      Mental Status: He is alert and oriented to person, place, and time.   Psychiatric:      Comments: Calm and cooperative     Imaging  Imaging Results              MRI Thoracic Spine Without Contrast (Final result)  Result time 03/11/24 18:52:52      Final result by Aye Nath MD (03/11/24 18:52:52)                   Impression:      Postoperative changes at T11-T12 with chronic appearing cord signal changes and mild canal narrowing.      Electronically signed by: Aye Nath  Date:    03/11/2024  Time:    18:52               Narrative:    EXAMINATION:  MRI THORACIC SPINE " WITHOUT CONTRAST    CLINICAL HISTORY:  Mid-back pain;    TECHNIQUE:  Multiplanar multisequence MR images of the thoracic spine are obtained without contrast.    COMPARISON:  CT chest, abdomen and pelvis dated 03/09/2024    FINDINGS:  There is exaggerated kyphosis centered at T11-T12.  There are postoperative changes with prior decompressive laminectomies at T11-T12.  The vertebral heights are maintained.  The bone marrow is normal in signal.    There are T2 signal changes in the lower thoracic cord at T11-T12, with associated volume loss, likely chronic.  There is no epidural fluid collection.    There are multilevel degenerative changes with marginal osteophytes and facet arthropathy.  There is mild canal narrowing at T11-T12 secondary to calcified disc bulge.  There are foraminal narrowing is most evident on the right at T9-T12 and on the left at T10-T12.    The paraspinal soft tissues are unremarkable.                                       MRI Lumbar Spine Without Contrast (Final result)  Result time 03/11/24 18:49:52      Final result by Aye Nath MD (03/11/24 18:49:52)                   Impression:      1. Limited evaluation.  2. Transitional lumbosacral anatomy.  3. Suspected moderate canal stenosis at L1-L4.  4. Multilevel neural foraminal stenoses as described.  5. L2-L3 disc edema with adjacent endplate sclerosis, favored to be degenerative.      Electronically signed by: Aye Nath  Date:    03/11/2024  Time:    18:49               Narrative:    EXAMINATION:  MRI LUMBAR SPINE WITHOUT CONTRAST    CLINICAL HISTORY:  Low back pain, progressive neurologic deficit;    TECHNIQUE:  Multiplanar multisequence MR images of the lumbar spine are obtained without contrast.    COMPARISON:  CT chest, abdomen and pelvis dated 03/09/2024    FINDINGS:  Evaluation is limited secondary to motion artifact.  There are 5 non-rib-bearing lumbar type vertebral bodies with a transitional type S1 vertebral body.   There is a mild rightward curvature of the lumbar spine with grade 1 retrolisthesis L2 over L3.  There is L2-L3 disc edema with adjacent endplate sclerosis.  There is no definite bone marrow edema.  The vertebral heights are preserved.    The conus terminates at the level of L1-L2.  It is not well visualized appears normal in contour.    Disc spaces, spinal canal and neural foramina are as follows:    L1-L2: Disc bulge and facet hypertrophy with moderate narrowing of the spinal canal.  Mild-to-moderate bilateral foraminal stenosis.    L2-L3: Grade 1 retrolisthesis of L2 over L3 and facet hypertrophy with moderate narrowing of the spinal canal.  Moderate right and severe left neural foraminal stenosis.    L3-L4: Disc bulge and facet hypertrophy with moderate narrowing of the spinal canal.  Moderate bilateral foraminal stenosis.    L4-L5: Disc bulge and facet hypertrophy with mild narrowing of the spinal canal.  Moderate right and mild left neural foraminal stenosis.    L5-S1: Disc bulge and facet hypertrophy.  No significant spinal canal stenosis.  Mild bilateral neural foraminal stenosis.    There is mild edema in the right posterior paraspinal soft tissues at L3-L4.                                       MRI Brain Without Contrast (Final result)  Result time 03/11/24 18:43:38      Final result by Gisselle Oneill MD (03/11/24 18:43:38)                   Impression:      No abnormality seen      Electronically signed by: Moncho Oneill  Date:    03/11/2024  Time:    18:43               Narrative:    EXAMINATION:  MRI BRAIN WITHOUT CONTRAST    CLINICAL HISTORY:  Dizziness, non-specific;    TECHNIQUE:  Multiplanar multisequence MR imaging of the brain was performed without contrast.    COMPARISON:  03/09/2024    FINDINGS:  No intracranial mass or lesion is seen.  No hemorrhage is seen.  No acute infarct is seen.  No diffusion abnormality seen.  No parenchymal abnormality is seen.  Gyri and sulci appear normal.   Ventricles and basilar cisterns appear grossly unremarkable.  No abnormal white matter changes are seen..  Posterior fossa appears normal.  Calvarium is intact.  Paranasal sinuses appear grossly unremarkable.                                       CT Cervical Spine Without Contrast (Final result)  Result time 03/09/24 10:26:23      Final result by Natanael Pereira MD (03/09/24 10:26:23)                   Narrative:    EXAMINATION  CT CERVICAL SPINE WITHOUT CONTRAST    CLINICAL HISTORY  Neck pain, chronic;    TECHNIQUE  Non-contrast helical-acquisition CT images of the cervical spine were obtained and multiplanar reformats accomplished by a CT technologist at a separate workstation, pushed to PACS for physician review.    COMPARISON  22 December 2022    FINDINGS  Images were reviewed in bone, soft tissue, and lung windows.    Exam quality: adequate for evaluation    Visualized levels: Skull base through T2 upper endplate.    Vertebral segments: No displaced fracture visualized. No acute compression deformity or focal vertebral body abnormality. The C1 lateral masses are symmetrically aligned on C2.  No evidence of traumatic subluxation. There are similar degenerative endplate changes and associated osteophytic projections, as well as multilevel bilateral facet arthrosis.  Chronic/degenerative grade 1 anterolisthesis of C3 on C4 and C7 on T1 also unchanged.    Disc spaces: Multilevel intervertebral narrowing is present. No acute process identified.    Curvature: No grossly abnormal curvature appreciated.    Paravertebral tissue/musculature: No thickening or focal contour irregularity. The paraspinal musculature and overlying subcutaneous tissues demonstrate no acute or focal lesion.    Other findings: The visualized thyroid tissue is unremarkable. Scattered vascular calcifications are present. The included upper lung zones and mediastinal vasculature are without focal abnormality. No acute or suspicious focal  abnormality of the included brain and skull base.    IMPRESSION  1. No evidence of acute osseous displacement or traumatic malalignment.  2. Degenerative changes and other nonacute secondary details discussed above, similar in the interval.  ==========    Please note ligament, spinal cord, and/or vascular abnormalities cannot be excluded on the basis of this examination.  Additional imaging recommended if there is elevated clinical concern for high-grade soft tissue injury.    RADIATION DOSE  Automated tube current modulation, weight-based exposure dosing, and/or iterative reconstruction technique utilized to reach lowest reasonably achievable exposure rate.    DLP: 407 mGy*cm      Electronically signed by: Natanael Pereira  Date:    03/09/2024  Time:    10:26                                     X-Ray Hand 3 view Right (Final result)  Result time 03/09/24 10:28:23      Final result by Chevy Santillan MD (03/09/24 10:28:23)                   Impression:      No displaced fracture      Electronically signed by: Chevy Santillan MD  Date:    03/09/2024  Time:    10:28               Narrative:    EXAMINATION:  Three views right hand    CLINICAL HISTORY:  Fall    COMPARISON:  06/09/2015    FINDINGS:  No displaced fracture or dislocation identified.  No radiopaque foreign body.                                       X-Ray Hand 3 view Left (Final result)  Result time 03/09/24 10:29:29   Procedure changed from X-Ray Hand 2 View Bilat     Final result by Chevy Santillan MD (03/09/24 10:29:29)                   Impression:      No acute osseous findings      Electronically signed by: Chevy Santillan MD  Date:    03/09/2024  Time:    10:29               Narrative:    EXAMINATION:  Three views left hand    CLINICAL HISTORY:  Fall    COMPARISON:  01/01/2021    FINDINGS:  Punctate radiodense foreign body near the little finger distal tuft is unchanged.  No acute fracture or dislocation.                                       CT Chest  Abdomen Pelvis Without Contrast (XPD) (Final result)  Result time 03/09/24 10:58:22   Procedure changed from CT Chest Without Contrast     Final result by Bobby Maguire MD (03/09/24 10:58:22)                   Impression:      1. No acute abnormality identified within the chest.  2. Emphysematous changes are present.  3. Urinary bladder wall thickening may reflect under distension, cystitis, or chronic hypertrophic changes related to prostatomegaly.  4. Fluid-filled distended stomach.  5. Moderate colonic stool with ample colonic gas.  6. [employer] concordance.      Electronically signed by: Bobby Maguire MD  Date:    03/09/2024  Time:    10:58               Narrative:    EXAMINATION:  CT CHEST ABDOMEN PELVIS WITHOUT CONTRAST(XPD)    CLINICAL HISTORY:  Sepsis.    TECHNIQUE:  Axial CT images of the chest were obtained without intravenous contrast.  Coronal and sagittal reformations were obtained.  Axial CT images of the abdomen and pelvis were obtained without intravenous contrast.  Coronal and sagittal reformations were obtained. Automated exposure control was utilized. Total exam DLP is 282 mGy cm.    COMPARISON:  CT chest 09/05/2021, CT abdomen and pelvis 10/22/2022    FINDINGS:  CHEST:    Absence of intravenous contrast limits evaluation.  Atherosclerotic calcifications are noted.  Thoracic aorta is not aneurysmal.  There is trace pericardial fluid.  There is no lymphadenopathy appreciated.  There is no pleural effusion demonstrated.  Centrilobular emphysematous changes are present.  There is a 5 mm ground-glass nodule within the left upper lobe image 34 of series 4.  There is dependent atelectasis.  There is no consolidation or pneumothorax identified.  No acute displaced fractures identified.    ABDOMEN AND PELVIS:    Absence of intravenous contrast limits evaluation.  There is markedly distended stomach which is fluid-filled.  The non contrasted liver, gallbladder, pancreas, spleen, and adrenal glands  appear grossly unremarkable.  Kidneys demonstrate no hydronephrosis or nephrolithiasis.  Atherosclerotic calcifications are noted.  Abdominal aorta is not aneurysmal.  There is moderate colonic stool.  There is ample colonic gas present.  There is no evidence of acute appendicitis identified.  Prostate is enlarged.  Coarse calcifications are noted within.  Urinary bladder demonstrates mild wall thickening likely related to under distension.  Differential would include cystitis or chronic hypertrophic changes related to enlarged prostate.  No intraperitoneal free air or free fluid appreciated.  No definite lymphadenopathy identified.  No acute displaced fracture noted.                        Preliminary result by Interface, Rad Results In (03/09/24 03:24:42)                   Impression:    1. The bladder is nondistended however the bladder wall appears thickened after considering state of nondistension which could reflect an element of cystitis.  2. There are emphysematous changes in the bilateral lungs, more pronounced in the bilateral upper lobes. A 4 cm bulla is seen in the anterior right upper lobe along the restrosternal region. There is a 4 mm ground glass nodule in the left lower lobe best visualized on series 4 image 66.  3. The stomach is markedly fluid distended.  4. The 3rd duodenal segment is not well delineated. Possibility of partial bowel obstruction secondary to SMA syndrome is not excluded. Correlate with clinical and laboratory findings as regards additional evaluation and follow-up to full resolution as indicated.  5. Details and other findings as discussed above.               Narrative:    START OF REPORT:  Technique: CT Scan of the chest abdomen and pelvis was performed without intravenous contrast with axial as well as sagittal and, coronal images.    Dosage Information: Automated Exposure Control was utilized.    Comparison: Comparison is with abdomen study dated 2022-10-22 09:44:39.  Comparison is with chest study dated 2021-09-05 11:05:57.    Clinical History: Sepsis.    Findings:  Soft Tissues: Unremarkable.  Lines and Tubes: None.  Neck: The visualized soft tissues of the neck appear unremarkable.  Mediastinum: The mediastinal structures are within normal limits.  Heart: The heart size is within normal limits. Mild coronary artery calcification is seen.  Aorta: Unremarkable appearing aorta.  Lungs: There is mild non specific dependent change at the lung bases. There are emphysematous changes in the bilateral lungs, more pronounced in the bilateral upper lobes. A 4 cm bulla is seen in the anterior right upper lobe along the restrosternal region. There is a 4 mm ground glass nodule in the left lower lobe best visualized on series 4 image 66.  Pleura: No effusions or pneumothorax are identified.  Bony Structures: Mild degenerative changes are seen in the right shoulder joint.  Spine: Mild spondylolytic changes are seen in the thoracic spine.  Ribs: The ribs appear unremarkable.  Abdomen:  Abdominal Wall: No abdominal wall pathology is seen.  Liver: The liver appears unremarkable.  Biliary System: No intrahepatic or extrahepatic biliary duct dilatation is seen.  Gallbladder: The gallbladder appears unremarkable.  Pancreas: The pancreas appears unremarkable.  Adrenals: The adrenal glands appear unremarkable.  Kidneys: The kidneys appear unremarkable with no stones cysts masses or hydronephrosis.  Aorta: There is moderate calcification of the.  Bowel:  Esophagus: The visualized esophagus appears unremarkable.  Stomach: The stomach is markedly fluid distended.  Duodenum: The 3rd duodenal segment is not well delineated.  Appendix: The appendix is not identified but no inflammatory changes are seen in the right lower quadrant to suggest appendicitis.  Peritoneum: No intraperitoneal free air or ascites is seen.    Pelvis:  Bladder: The bladder is nondistended however the bladder wall appears thickened  after considering state of nondistension which could reflect an element of cystitis.  Male:  Prostate gland: The prostate gland is mildly enlarged. There are numerous calcifications prostate gland.    Bony structures:  Dorsal Spine: There is moderate spondylosis of the visualized dorsal spine. There is grade I retrolisthesis of L1 over L2. There is severe disc space narrowing at L1-L2 with pronounced sclerosis of the apposing endplates.  Bony Pelvis: The visualized bony structures of the pelvis appear unremarkable.                                         CT Head Without Contrast (Final result)  Result time 03/09/24 09:56:13      Final result by Bobby Magiure MD (03/09/24 09:56:13)                   Impression:      1. No acute intracranial abnormalities identified.  2. [employer] concordance.      Electronically signed by: Bobby Maguire MD  Date:    03/09/2024  Time:    09:56               Narrative:    EXAMINATION:  CT HEAD WITHOUT CONTRAST    CLINICAL HISTORY:  Slurred speech, left facial numbness.    TECHNIQUE:  Axial CT images were obtained of the brain without intravenous contrast.  Coronal and sagittal reformations were obtained.  Automated exposure control utilized to reduce radiation dose.  Total exam DLP is 975 mGy cm.    COMPARISON:  08/28/2012    FINDINGS:  Gray-white matter differentiation is within normal limits. There is chronic involutional change.  There is chronic white matter microischemic change.  There is minimal intracranial atherosclerosis.  No acute intracranial hemorrhage, extra-axial fluid collection, hydrocephalus, mass effect, or midline shift is noted.  No large vessel territory acute ischemia is identified.  Visualized paranasal sinuses are clear.  Visualized mastoid air cells are sclerotic bilaterally could reflect sequela of chronic mastoiditis change.  No acute displaced calvarial fracture is identified.                        Preliminary result by Interface, Rad Results In  (03/09/24 03:17:53)                   Impression:    1. No acute intracranial process identified. Details and other findings as noted above.               Narrative:    START OF REPORT:  Technique: CT of the head was performed without intravenous contrast with axial as well as coronal and sagittal images.    Comparison: Comparison is with study dated2012-08-28 17:28:39.    Dosage Information: Automated exposure control was utilized.    Clinical history: Cerebrovascular Accident (Pt arrives via [facility], EMS reports pt last seen normal at 0000, spouse reported to EMS pt speech slurred, numbness in the left face. On arrival pt has mild slurred speech, no facial asymmetry, no decreased strength, decreased sensation to the RLE.    Findings:  Hemorrhage: No acute intracranial hemorrhage is seen.  CSF spaces: The ventricles, sulci and basal cisterns all appear somewhat prominent suggesting an element of global cerebral atrophy.  Brain parenchyma: There is preservation of the grey white junction throughout. No acute infarct. Subtle scattered microvascular change is seen in portions of the periventricular and deep white matter tracts.  Cerebellum: Unremarkable.  Vascular: Unremarkable venous sinuses.  Sella and skull base: The sella appears to be within normal limits for age.  Cerebellopontine angles: Within normal limits.  Herniation: None.  Intracranial calcifications: Incidental note is made of bilateral choroid plexus calcification. Incidental note is made of some pineal region calcification.  Calvarium: No acute linear or depressed skull fracture is seen.    Maxillofacial Structures:  Paranasal sinuses: The visualized paranasal sinuses appear clear with no mucoperiosteal thickening or air fluid levels identified.  Orbits: The orbits appear unremarkable.  Zygomatic arches: The zygomatic arches are intact and unremarkable.  Temporal bones and mastoids: The bilateral somewhat sclerotic suggesting chronic mastoiditis.  TMJ:  The mandibular condyles appear normally placed with respect to the mandibular fossa.                                         X-Ray Chest AP Portable (Final result)  Result time 03/09/24 10:27:35      Final result by Chevy Santillan MD (03/09/24 10:27:35)                   Impression:      No acute findings in the chest      Electronically signed by: Chevy Santillan MD  Date:    03/09/2024  Time:    10:27               Narrative:    EXAMINATION:  XR CHEST AP PORTABLE    CLINICAL HISTORY:  Respiratory failure, unspecified, unspecified whether with hypoxia or hypercapnia    COMPARISON:  03/05/2024    FINDINGS:  Single view of the chest shows improved central aeration compared to the prior study without focal consolidation, pneumothorax or pleural effusion.  Cardiac silhouette and pulmonary vasculature are normal.                                       Lab Review   Recent Results (from the past 24 hour(s))   Magnesium    Collection Time: 03/14/24  4:09 AM   Result Value Ref Range    Magnesium Level 2.00 1.60 - 2.60 mg/dL   Comprehensive Metabolic Panel    Collection Time: 03/14/24  4:09 AM   Result Value Ref Range    Sodium Level 138 136 - 145 mmol/L    Potassium Level 3.0 (L) 3.5 - 5.1 mmol/L    Chloride 96 (L) 98 - 107 mmol/L    Carbon Dioxide 32 (H) 22 - 29 mmol/L    Glucose Level 140 (H) 74 - 100 mg/dL    Blood Urea Nitrogen 39.0 (H) 8.4 - 25.7 mg/dL    Creatinine 1.08 0.73 - 1.18 mg/dL    Calcium Level Total 7.3 (L) 8.4 - 10.2 mg/dL    Protein Total 4.9 (L) 6.4 - 8.3 gm/dL    Albumin Level 1.5 (L) 3.5 - 5.0 g/dL    Globulin 3.4 2.4 - 3.5 gm/dL    Albumin/Globulin Ratio 0.4 (L) 1.1 - 2.0 ratio    Bilirubin Total 1.6 (H) <=1.5 mg/dL    Alkaline Phosphatase 134 40 - 150 unit/L    Alanine Aminotransferase 136 (H) 0 - 55 unit/L    Aspartate Aminotransferase 148 (H) 5 - 34 unit/L    eGFR >60 mls/min/1.73/m2   CBC with Differential    Collection Time: 03/14/24  4:09 AM   Result Value Ref Range    WBC 15.29 (H) 4.50 - 11.50  x10(3)/mcL    RBC 3.54 (L) 4.70 - 6.10 x10(6)/mcL    Hgb 10.6 (L) 14.0 - 18.0 g/dL    Hct 31.7 (L) 42.0 - 52.0 %    MCV 89.5 80.0 - 94.0 fL    MCH 29.9 27.0 - 31.0 pg    MCHC 33.4 33.0 - 36.0 g/dL    RDW 14.8 11.5 - 17.0 %    Platelet 65 (L) 130 - 400 x10(3)/mcL    MPV 12.5 (H) 7.4 - 10.4 fL    Neut % 90.1 %    Lymph % 4.2 %    Mono % 2.0 %    Eos % 0.3 %    Basophil % 0.5 %    Lymph # 0.64 0.6 - 4.6 x10(3)/mcL    Neut # 13.79 (H) 2.1 - 9.2 x10(3)/mcL    Mono # 0.30 0.1 - 1.3 x10(3)/mcL    Eos # 0.04 0 - 0.9 x10(3)/mcL    Baso # 0.08 <=0.2 x10(3)/mcL    IG# 0.44 (H) 0 - 0.04 x10(3)/mcL    IG% 2.9 %    NRBC% 0.0 %   POCT glucose    Collection Time: 03/14/24  5:37 AM   Result Value Ref Range    POCT Glucose 149 (H) 70 - 110 mg/dL   POCT glucose    Collection Time: 03/14/24  3:44 PM   Result Value Ref Range    POCT Glucose 113 (H) 70 - 110 mg/dL             Assessment/Plan:  1. Streptococcus dysgalactiae sepsis  2.  Left lower extremity cellulitis  3. Acute kidney injury   4. Transaminitis   5.  Rhabdomyolysis  6. Small bowel obstruction/colonic ileus  7.  Anasarca/ascites/bilateral pleural effusions   8.  Polysubstance abuse  9.  Anemia       1. Streptococcus dysgalactiae sepsis  2.  Left lower extremity cellulitis  3. Acute kidney injury   4. Transaminitis   5.  Rhabdomyolysis  6. Small bowel obstruction/colonic ileus  7.  Anasarca/ascites/bilateral pleural effusions   8.  Polysubstance abuse  9.  Anemia       -We will continue ceftriaxone  -No fevers and leukocytosis trending down, follow  -3/6 and 3/9 blood cultures with Streptococcus dysgalactiae; 3/12 blood cultures negative so far, follow  -Needs a FREEMAN to rule out endocarditis, especially considering findings which could be thromboembolic/ischemia related  -Left lower extremity blister with some erythema though overall not very impressive and has generalized edema with anasarca, pleural effusion and ascites  -Elevated CPK, transaminitis and lactic acidosis  trending down  -Renal impairment noted with creatinine trending down, follow   -Findings of partial small bowel obstruction and ileus concerning, surgery team consulted, follow   -Hepatitis-B serology positive and follow with HCV quantitative RNA.  -Discussed with patient, wife and with nursing staff.

## 2024-03-15 NOTE — PROGRESS NOTES
[facility] Hospital Medicine Progress Note        Chief Complaint: Inpatient Follow-up for     HPI per admitting team: Sebastian Davis is a 54 y.o. male with a PMHx of polysubstance abuse and chronic back pain who presented to [facility] via EMS on 3/9/2024 with c/o generalized weakness and numbness to fingers and toes, slurred speech and numbness to the left side of his face upon awakening at midnight.  Patient last smoked methamphetamine  and marijuana x3 days ago.  He denied any IV drug use, fever, CP. Symptoms apparently resolved prior to arriving in the ED. NIH of 0 and resolution of symptoms therefore stroke workup not obtained initially.  However patient began experiencing these symptoms again while in the ED. He also complains of bilateral lower extremity weakness, numbness.  He also endorsed a fall x1 day ago.  Of note, patient was seen in the ED on 03/05/2024 with complaints of chronic back pain requesting pain medication also noted to have a fever and cough; CXR demonstrated confluent airspace opacities in the left infrahilar region and early infiltrate can not be excluded.  He was discharged home on Augmentin and azithromycin for pneumonia. BLOOD CULTURES X2 FROM 3/6/2024 POSITIVE FOR Streptococcus dysgalactiae ssp equisimilis.  Upon presentation to ED, vital signed included /108, , RR 18, temperature 98.3° F. Labs notable for WBC 18.15, platelets 114, sodium 129, potassium 5.2, chloride 91, CO2 11, BUN 67.5, creatinine 3.82, calcium 7.9, albumin 2.7, , , CPK 10,110.  Lactic acid elevated at 9.4 and repeat 7.6.  Acetaminophen and salicylate level undetectable.  UDS positive for amphetamines, fentanyl and cannabinoids.  Influenza, RSV and COVID-19 negative.  Urinalysis with 2+ protein, trace ketones, 3+ blood, positive nitrites, 2+ bilirubin, 4 urobilinogen, 6-10 RBCs, 21-50 WBCs and moderate bacteria.  Urine creatinine 61.9.  Blood cultures x2 and urine  culture pending. CT head without contrast negative for acute intracranial process.  CT chest abdomen and pelvis without contrast demonstrated nondistended bladder however bladder wall appears thickened which could reflect an element of cystitis; emphysematous changes in bilateral lungs more pronounced in bilateral upper lobes, 4 cm bulla seen in the anterior right upper lobe along the retrosternal region and a 4 mm ground-glass nodule in the left lower lobe; stomach is markedly fluid distended; 3rd duodenal segment is not well delineated possibility of partial bowel obstruction secondary to SMA syndrome is not excluded.  He was started on broad-spectrum IV antibiotics in ED, also received sodium bicarbonate 50 mEq, and calcium gluconate 1 g in ED.  Admitted to hospital medicine service for further medical management.  At the time of exam patient is pale, diaphoretic, tachycardic, dyspneic on supplemental oxygen.  Notified by nurse that lactic acid and CPK are up trending.  ICU nurse, ED nurse and nursing supervisor present at the bedside.  Attending MD notified.  Patient admitted for sepsis with blood cultures already positive from a previous ER visit 2 days before this admission.  Patient was found to have Streptococcus dysgalactiae of the ER visit 3/6/24.  On this admission 03/09/2024 patient was admitted and placed on vancomycin and Zosyn.  Also he had findings of rhabdomyolysis/OKSANA/hypotensive/electrolyte abnormalities.  Patient was fluid resuscitated and vital signs much improved.  He had issues with hypoglycemia in the presence of sepsis that resolved with glucagon and changing fluids to dextrose.  Patient has an anion gap metabolic acidosis with findings of elevated lactic acid/very low CO2/rhabdo and we decided to place him on a sodium bicarb drip.  Overall he is improved.  I reviewed his CT of abdomen since he remains very tender and have decided to place him NPO secondary to fluid-filled stomach.  We will  DC vancomycin and continue Zosyn.  We will continue to replace electrolytes.  His platelets have dropped to around 26,000. We will DC Lovenox workup thrombocytopenia to exclude any causes or than sepsis    3/11/24 Looks improved  from admission . Plt's dropped even further. Peripheral smear with thrombocytopenia with small plt clumps. No active bleeding . Will repeat blood cx's for tomorrow am and will consult ID since pt may need FREEMAN since TTE w poor windows. Hematology consulted for eval of profound thrombocytopenia on a pt with decrease retic count and sepsis. Will start liquid diet . Creatinine is trending down. Ck's at around 3000. Co2  26, will dc sodium b icarbonate drip and continue d5 nss to avoid hypoglycemia . Blood sugars wnl, no further hypoglycemia .  3/12/24 dr hutchinson - looking better/ creat improving as well as plt's. Thrombocytopenia probably related to sepsis. Plt's at [id number] from [id number].  Creat at 1.5. abdomen is distended w/o rebound. Will do ct w contrast and continue fluids at 125 cc.   Blood cx's repeated . Lactic acid trendingdown   Plaed NG tube-->  obtaining around 2.5 L of bilious fluid.  CT abdomen with IV/oral contrast was done howing partial small-bowel obstruction/ileus/ascites/anasarca/bilateral pleural effusions.  White blood cell count improving as well as LFTs/renal function.  We will continue to replace electrolytes, potassium.  Blood cultures drawn on 03/12/2024 ntd  Now on rocephin 2 grams   4 mm ground-glass left upper lobe nodule  3/12- CT Abd and pelvis with IV contrast, oral contrast shows finding consistent with partial small-bowel obstruction, consistent with colonic ileus, interval development of large volume ascites, and told development of anasarca edema, bibasilar atelectasis with bilateral pleural effusion     Interval Hx:   Patient is laying in bed. Wanting to drink water, discussed only ice chips were allowed   NG Tube still in place   Case was discussed with  patient's nurse and  on the floor.    Objective/physical exam:  General:  Looks ill, NG tube in place, dried blood on the lips   Chest: Clear to auscultation bilaterally anteriorly  Heart:  Tachycardic rate and rhythm, +S1, S2, no appreciable murmur  Abdomen:  Distended but soft, bowel sounds hypoactive  MSK: Warm, no lower extremity edema, no clubbing or cyanosis  Neurologic: Alert and oriented x4, able to move all 4 extremities but extremely weak    VITAL SIGNS: 24 HRS MIN & MAX LAST   Temp  Min: 97.4 °F (36.3 °C)  Max: 98.8 °F (37.1 °C) 97.4 °F (36.3 °C)   BP  Min: 109/64  Max: 134/77 112/78   Pulse  Min: 97  Max: 116  (!) 111   Resp  Min: 18  Max: 20 18   SpO2  Min: 94 %  Max: 100 % (!) 94 %     I reviewed the labs below:  Recent Labs   Lab 03/09/24  0844 03/09/24  1804 03/13/24  0414 03/14/24  0409 03/15/24  0417   WBC 8.33  8.33   < > 16.71  16.71* 15.29* 17.79*   RBC 5.29   < > 3.52* 3.54* 3.58*   HGB 16.0   < > 10.6* 10.6* 10.9*   HCT 46.4   < > 31.1* 31.7* 32.4*   MCV 87.7   < > 88.4 89.5 90.5   MCH 30.2   < > 30.1 29.9 30.4   MCHC 34.5   < > 34.1 33.4 33.6   RDW 14.4   < > 14.7 14.8 14.6   PLT 94*   < > 40* 65* 92*   MPV 12.7*  --   --  12.5* 14.0*    < > = values in this interval not displayed.       Recent Labs   Lab 03/09/24  0410 03/09/24  0844 03/12/24  0353 03/12/24  1854 03/13/24  0414 03/14/24  0409 03/15/24  0417   NA  --    < > 135*   < > 136 138 138   K  --    < > 2.7*   < > 3.0* 3.0* 3.7   CO2  --    < > 31*   < > 32* 32* 33*   BUN  --    < > 62.1*   < > 56.1* 39.0* 32.6*   CREATININE  --    < > 1.51*   < > 1.34* 1.08 0.99   CALCIUM  --    < > 7.2*   < > 7.3* 7.3* 7.2*   PH 7.420  --   --   --   --   --   --    MG  --    < > 2.30  --   --  2.00 1.80   ALBUMIN  --    < > 1.5*  --  1.4* 1.5* 1.3*   ALKPHOS  --    < > 87  --  121 134 101   ALT  --    < > 283*  --  189* 136* 84*   AST  --    < > 313*  --  209* 148* 87*   BILITOT  --    < > 1.5  --  1.3 1.6* 1.8*    < > = values in  this interval not displayed.       Microbiology Results (last 7 days)       Procedure Component Value Units Date/Time    Blood Culture [[id number]]  (Normal) Collected: 03/12/24 0348    Order Status: Completed Specimen: Blood from Antecubital, Left Updated: 03/15/24 0405     CULTURE, BLOOD ([facility]) No Growth At 72 Hours    Blood Culture [[id number]]  (Normal) Collected: 03/12/24 0353    Order Status: Completed Specimen: Blood from Hand, Left Updated: 03/15/24 0405     CULTURE, BLOOD ([facility]) No Growth At 72 Hours    Blood culture #1 **CANNOT BE ORDERED STAT** [[id number]]  (Abnormal)  (Susceptibility) Collected: 03/09/24 0129    Order Status: Completed Specimen: Blood Updated: 03/11/24 1207     CULTURE, BLOOD ([facility]) Streptococcus dysgalactiae ssp equisimilis     GRAM STAIN 1 of 1 Aerobic bottle positive      Gram Positive Cocci, probable Streptococcus      Seen in gram stain of broth only    Blood culture #2 **CANNOT BE ORDERED STAT** [[id number]]  (Abnormal)  (Susceptibility) Collected: 03/09/24 0129    Order Status: Completed Specimen: Blood Updated: 03/11/24 1152     CULTURE, BLOOD ([facility]) Streptococcus dysgalactiae ssp equisimilis     GRAM STAIN 2 of 2 bottles positive      Gram Positive Cocci, probable Streptococcus      Seen in gram stain of broth only    Urine culture [[id number]] Collected: 03/09/24 0217    Order Status: Completed Specimen: Urine Updated: 03/11/24 0916     Urine Culture No Growth    Stool Culture [[id number]]     Order Status: Sent Specimen: Stool     BCID2 Panel [[id number]]  (Abnormal) Collected: 03/09/24 0129    Order Status: Completed Specimen: Blood Updated: 03/09/24 1705     CTX-M (ESBL ) N/A     IMP (Cabapenemase ) N/A     KPC resistance gene (Carbapenemase ) N/A     mcr-1 N/A     mecA ID N/A     Comment: Note: Antimicrobial resistance can occur via multiple mechanisms. A Not Detected result for antimicrobial resistance gene(s) does not indicate antimicrobial  susceptibility. Subculturing is required for species identification and susceptibility testing of   isolates.        mecA/C and MREJ (MRSA) gene N/A     NDM (Carbapenemase ) N/A     OXA-48-like (Carbapenemase ) N/A     Isela/B (VRE gene) N/A     VIM (Carbapenemase ) N/A     Enterococcus faecalis Not Detected     Enterococcus faecium Not Detected     Listeria monocytogenes Not Detected     Staphylococcus spp. Not Detected     Staphylococcus aureus Not Detected     Staphylococcus epidermidis Not Detected     Staphylococcus lugdunensis Not Detected     Streptococcus spp. Detected     Streptococcus agalactiae (Group B) Not Detected     Streptococcus pneumoniae Not Detected     Streptococcus pyogenes (Group A) Not Detected     Acinetobacter calcoaceticus/baumannii complex Not Detected     Bacteroides fragilis Not Detected     Enterobacterales Not Detected     Enterobacter cloacae complex Not Detected     Escherichia coli Not Detected     Klebsiella aerogenes Not Detected     Klebsiella oxytoca Not Detected     Klebsiella pneumoniae group Not Detected     Proteus spp. Not Detected     Salmonella spp. Not Detected     Serratia marcescens Not Detected     Haemophilus influenzae Not Detected     Neisseria meningitidis Not Detected     Pseudomonas aeruginosa Not Detected     Stenotrophomonas maltophilia Not Detected     Candida albicans Not Detected     Candida auris Not Detected     Candida glabrata Not Detected     Candida krusei Not Detected     Candida parapsilosis Not Detected     Candida tropicalis Not Detected     Cryptococcus neoformans/gattii Not Detected    Narrative:      The BERD BCID2 Panel is a multiplexed nucleic acid test intended for the use with Rouxbe® 2.0 or Rouxbe® [employer] for the simultaneous qualitative detection and identification of multiple bacterial and yeast nucleic acids and select genetic determinants associated with antimicrobial  resistance.  The [employer] BCID2 Panel test is performed directly on blood culture samples identified as positive by a continuous monitoring blood culture system.  Results are intended to be interpreted in conjunction with Gram stain results.             See below for Radiology    Scheduled Med:   cefTRIAXone (Rocephin) IV (PEDS and ADULTS)  2 g Intravenous Q24H    folic acid  1 mg Oral Daily    nystatin  500,000 Units Oral QID      Continuous Infusions:   Amino acid 4.25% - dextrose 5% (CLINIMIX-E) solution (1L provides 42.5 gm AA, 50 gm CHO (170 kcal/L dextrose), Na 35, K 30, Mg 5, Ca 4.5, Acetate 70, Cl 39, Phos 15) 65 mL/hr at 03/14/24 1255    dextrose 5 % and 0.9 % NaCl 60 mL/hr at 03/14/24 1140      PRN Meds:  acetaminophen, aluminum-magnesium hydroxide-simethicone, glucagon (human recombinant), HYDROmorphone, levalbuterol, melatonin, ondansetron, polyethylene glycol, prochlorperazine, senna-docusate 8.6-50 mg, sodium chloride 0.9%     Assessment/Plan:  Severe sepsis secondary to Streptococcus dysgalactiae   Left lower extremity cellulitis   Acute hypoxemic respiratory failure secondary to bilateral pleural effusions  Partial bowel obstruction secondary to SMA syndrome vs SBO- npow with dilated Small bowel loop upto 5.7cm  OKSANA - resolved  Lactic acidosis- resolved  Rhabdomyolysis- resolved  Transaminitis- trending down   Leukocytosis- persist  Hyponatremia/Hypochloremia - resolved  Severe hypokalemia - persist  Hypocalcemia -persist  Intractable hypoglycemia- resolved  Abd pain / small bowel obstruction /ileus  Ascites/anasarca  Thrombocytopenia  Polysubstance abuse-UDS positive for amphetamine, fentanyl, cannabinoids  Tobacco abuse   Chronic back pain    ID DR RILEY on board, appreciate recs, recommended may need FREEMAN to r/o thromboembolic phenomena once stable  Zosyn switched to rocephin 2 grams daily to prevent nephrotoxicity  WBC increased to 17 K  03/12- repeat Blood Cultures x2- negative at 48 hours.  Concern  for partial small bowel obstruction/ileus--> per dr Overton note, general surgery evaluated the patient- no intervention, I could not find the consult note  Reconsulted General surgery given x-ray shows small loop dilation of 5.7 cm now  Continue NPO  Continue NG tube low intermittent suction  Cont Clinimix E at 65 cc/hour- day 2  Decrease IV fluids to 60 cc/hour  Hep C Ab +, tx naive- ID aware  Gi pcr- pending collection  Appreciate assistance from hematology for his persistent thrombocytopenia, recommended to hold anticoagulation till platelets are greater than 50,000, transfuse 1 unit platelet if platelet drops to less than 10,000 or started bleeding.  Recommend SCDs for VTE prevention in the interim  Received calcium gluconate x 1 on 3/10  Potassium normalized to 3.7 post replacement, also on Clinimix E which will replete some electrolytes  AST/ALT 87/84, trending down  Overall, condition is guarded.  Appreciate all consultants with assist in his case  Morning CBC, BMP ordered    VTE prophylaxis:    SCDs, avoid chem prophylaxis due to thrombocytopenia                      Patient condition:  Guarded    Anticipated discharge and Disposition:   TBD    Critical care note:  Critical care diagnosis:  Prolonged NPO requiring Clinimix infusion  Critical care interventions: Hands-on evaluation, review of labs/radiographs/records and discussion with patient and family if present  Critical care time spent: 35 minutes        All diagnosis and differential diagnosis have been reviewed; assessment and plan has been documented; I have personally reviewed the labs and test results that are presently available; I have reviewed the patients medication list; I have reviewed the consulting providers response and recommendations. I have reviewed or attempted to review medical records based upon their availability    All of the patient's questions have been  addressed and answered. Patient's is agreeable to the above stated plan. I  will continue to monitor closely and make adjustments to medical management as needed.  _____________________________________________________________________    Nutrition Status:  -  Radiology:   I have personally reviewed the images and agree with radiologist report  XR Gastric tube check, non-radiologist performed  EXAMINATION:  XR GASTRIC TUBE CHECK, NON-RADIOLOGIST PERFORMED    CLINICAL HISTORY:  placement;    TECHNIQUE:  AP View(s) of the abdomen was performed.    COMPARISON:  None.    FINDINGS:  Enteric tube terminates at the proximal stomach.  Side port above the GE junction.  Recommend advancing approximately 10 cm.    Electronically signed by: Kendal Emery  Date:    03/13/2024  Time:    13:27      Aniceto Montero MD  Department of Hospital Medicine   [facility]   03/15/2024

## 2024-03-16 LAB
ANION GAP SERPL CALC-SCNC: 10 MEQ/L
BUN SERPL-MCNC: 46.8 MG/DL (ref 8.4–25.7)
CALCIUM SERPL-MCNC: 7.4 MG/DL (ref 8.4–10.2)
CHLORIDE SERPL-SCNC: 98 MMOL/L (ref 98–107)
CO2 SERPL-SCNC: 31 MMOL/L (ref 22–29)
CREAT SERPL-MCNC: 1.01 MG/DL (ref 0.73–1.18)
CREAT/UREA NIT SERPL: 46
ERYTHROCYTE [DISTWIDTH] IN BLOOD BY AUTOMATED COUNT: 14.6 % (ref 11.5–17)
GFR SERPLBLD CREATININE-BSD FMLA CKD-EPI: >60 MLS/MIN/1.73/M2
GLUCOSE SERPL-MCNC: 119 MG/DL (ref 74–100)
HCT VFR BLD AUTO: 31 % (ref 42–52)
HGB BLD-MCNC: 10.6 G/DL (ref 14–18)
MCH RBC QN AUTO: 30.1 PG (ref 27–31)
MCHC RBC AUTO-ENTMCNC: 34.2 G/DL (ref 33–36)
MCV RBC AUTO: 88.1 FL (ref 80–94)
NRBC BLD AUTO-RTO: 0 %
OHS QRS DURATION: 80 MS
OHS QTC CALCULATION: 320 MS
PLATELET # BLD AUTO: 119 X10(3)/MCL (ref 130–400)
PMV BLD AUTO: 12.9 FL (ref 7.4–10.4)
POCT GLUCOSE: 133 MG/DL (ref 70–110)
POTASSIUM SERPL-SCNC: 3.3 MMOL/L (ref 3.5–5.1)
RBC # BLD AUTO: 3.52 X10(6)/MCL (ref 4.7–6.1)
SODIUM SERPL-SCNC: 139 MMOL/L (ref 136–145)
WBC # SPEC AUTO: 17.5 X10(3)/MCL (ref 4.5–11.5)

## 2024-03-16 PROCEDURE — 63600175 PHARM REV CODE 636 W HCPCS: Performed by: INTERNAL MEDICINE

## 2024-03-16 PROCEDURE — 25000003 PHARM REV CODE 250: Performed by: INTERNAL MEDICINE

## 2024-03-16 PROCEDURE — 80048 BASIC METABOLIC PNL TOTAL CA: CPT | Performed by: INTERNAL MEDICINE

## 2024-03-16 PROCEDURE — 21400001 HC TELEMETRY ROOM

## 2024-03-16 PROCEDURE — 85027 COMPLETE CBC AUTOMATED: CPT | Performed by: INTERNAL MEDICINE

## 2024-03-16 RX ORDER — POTASSIUM CHLORIDE 14.9 MG/ML
20 INJECTION INTRAVENOUS
Status: COMPLETED | OUTPATIENT
Start: 2024-03-16 | End: 2024-03-16

## 2024-03-16 RX ADMIN — DIPHENHYDRAMINE HYDROCHLORIDE 5 ML: 25 SOLUTION ORAL at 06:03

## 2024-03-16 RX ADMIN — NYSTATIN 500000 UNITS: 100000 SUSPENSION ORAL at 04:03

## 2024-03-16 RX ADMIN — NYSTATIN 500000 UNITS: 100000 SUSPENSION ORAL at 09:03

## 2024-03-16 RX ADMIN — DEXTROSE AND SODIUM CHLORIDE: 5; 900 INJECTION, SOLUTION INTRAVENOUS at 01:03

## 2024-03-16 RX ADMIN — CEFTRIAXONE SODIUM 2 G: 2 INJECTION, POWDER, FOR SOLUTION INTRAMUSCULAR; INTRAVENOUS at 04:03

## 2024-03-16 RX ADMIN — LEUCINE, PHENYLALANINE, LYSINE, METHIONINE, ISOLEUCINE, VALINE, HISTIDINE, THREONINE, TRYPTOPHAN, ALANINE, GLYCINE, ARGININE, PROLINE, SERINE, TYROSINE, SODIUM ACETATE, DIBASIC POTASSIUM PHOSPHATE, MAGNESIUM CHLORIDE, SODIUM CHLORIDE, CALCIUM CHLORIDE, DEXTROSE
311; 238; 247; 170; 255; 247; 204; 179; 77; 880; 438; 489; 289; 213; 17; 297; 261; 51; 77; 33; 5 INJECTION INTRAVENOUS at 01:03

## 2024-03-16 RX ADMIN — POTASSIUM CHLORIDE 20 MEQ: 14.9 INJECTION, SOLUTION INTRAVENOUS at 10:03

## 2024-03-16 RX ADMIN — POTASSIUM CHLORIDE 20 MEQ: 14.9 INJECTION, SOLUTION INTRAVENOUS at 12:03

## 2024-03-16 RX ADMIN — NYSTATIN 500000 UNITS: 100000 SUSPENSION ORAL at 07:03

## 2024-03-16 RX ADMIN — NYSTATIN 500000 UNITS: 100000 SUSPENSION ORAL at 12:03

## 2024-03-16 NOTE — CONSULTS
Acute Care Surgery   Consult Note    Patient Name: Sebastian Davis  YOB: 1970  Date: 03/16/2024 12:04 AM  Date of Admission: 3/9/2024  HD#7  POD#* No surgery found *    PRESENTING HISTORY   Chief Complaint/Reason for Admission: Partial SBO    History of Present Illness:  54M who complex medical history and hospital course. Initially presented with generalized weakness and slurred speech. During hospitalization has been diagonsed with sepsis, cellulitis, OKSANA, UTI, and partial small bowel obstruction. General surgery consulted for evaluation. Patient has NGT with 1200 out today and currently having bowel function      Review of Systems:  12 point ROS negative except as stated in HPI    PAST HISTORY:   Past medical history:  Past Medical History:   Diagnosis Date    Basal cell carcinoma     Nose    Cord compression     Herniated disc, cervical     Thoracic spinal stenosis        Past surgical history:  Past Surgical History:   Procedure Laterality Date    BACK SURGERY      LAMINECTOMY      T10-T12       Family history:  Family History   Problem Relation Age of Onset    Arthritis Mother     Cancer Mother     COPD Mother     Cancer Father        Social history:  Social History     Socioeconomic History    Marital status:    Tobacco Use    Smoking status: Every Day     Current packs/day: 1.00     Types: Cigarettes    Smokeless tobacco: Never   Substance and Sexual Activity    Alcohol use: Yes     Alcohol/week: 3.0 standard drinks of alcohol     Types: 3 Cans of beer per week    Drug use: No    Sexual activity: Yes     Partners: Female     Social Determinants of Health     Social Connections: Unknown (3/11/2024)    Social Connection and Isolation Panel [NHANES]     Marital Status: Living with partner     Social History     Tobacco Use   Smoking Status Every Day    Current packs/day: 1.00    Types: Cigarettes   Smokeless Tobacco Never      Social History     Substance and Sexual Activity   Alcohol  Use Yes    Alcohol/week: 3.0 standard drinks of alcohol    Types: 3 Cans of beer per week        MEDICATIONS & ALLERGIES:     No current facility-administered medications on file prior to encounter.     Current Outpatient Medications on File Prior to Encounter   Medication Sig    albuterol (PROVENTIL/VENTOLIN HFA) 90 mcg/actuation inhaler Inhale 1-2 puffs into the lungs every 6 (six) hours as needed for Wheezing. Rescue    azithromycin (Z-TAYLOR) 250 MG tablet Take 1 tablet (250 mg total) by mouth once daily. Take first 2 tablets together, then 1 every day until finished.    cetirizine (ZYRTEC) 10 MG tablet Take 1 tablet (10 mg total) by mouth once daily.    gabapentin (NEURONTIN) 300 MG capsule Take 300 mg by mouth 3 (three) times daily.    HYDROcodone-acetaminophen (NORCO) 5-325 mg per tablet Take 1 tablet by mouth every 6 (six) hours as needed for Pain.    naproxen (NAPROSYN) 500 MG tablet Take 1 tablet (500 mg total) by mouth 2 (two) times daily with meals.    promethazine-dextromethorphan (PROMETHAZINE-DM) 6.25-15 mg/5 mL Syrp Take 5 mLs by mouth every 6 (six) hours as needed (cough).    TIZANIDINE HCL (ZANAFLEX ORAL) Take by mouth 3 (three) times daily as needed.       Allergies: Review of patient's allergies indicates:  No Known Allergies    Scheduled Meds:   cefTRIAXone (Rocephin) IV (PEDS and ADULTS)  2 g Intravenous Q24H    folic acid  1 mg Oral Daily    nystatin  500,000 Units Oral QID       Continuous Infusions:   Amino acid 4.25% - dextrose 5% (CLINIMIX-E) solution (1L provides 42.5 gm AA, 50 gm CHO (170 kcal/L dextrose), Na 35, K 30, Mg 5, Ca 4.5, Acetate 70, Cl 39, Phos 15) 65 mL/hr at 03/15/24 2028    dextrose 5 % and 0.9 % NaCl 60 mL/hr at 03/15/24 1622       PRN Meds:acetaminophen, aluminum-magnesium hydroxide-simethicone, glucagon (human recombinant), HYDROmorphone, levalbuterol, melatonin, ondansetron, polyethylene glycol, prochlorperazine, senna-docusate 8.6-50 mg, sodium chloride  "0.9%    OBJECTIVE:   Vital Signs:  VITAL SIGNS: 24 HR MIN & MAX LAST   Temp  Min: 97.4 °F (36.3 °C)  Max: 98.7 °F (37.1 °C)  98.1 °F (36.7 °C)   BP  Min: 109/64  Max: 121/81  121/81    Pulse  Min: 105  Max: 116  (!) 111    Resp  Min: 18  Max: 20  20    SpO2  Min: 94 %  Max: 100 %  100 %      HT: 5' 10" (177.8 cm)  WT: 81.6 kg (180 lb)  BMI: 25.8     I/O last 3 completed shifts:  In: -   Out: 4400 [Urine:1500; Drains:2900]  No intake/output data recorded.    Physical Exam:  General: Ill appearing  HEENT: NGT in place, gastric contents output  GI:  Abdomen soft, non-tender, non-distended, no guarding, no rebound, normoactive bowel sounds, no masses  Neuro:  CNII-XII grossly intact, alert and oriented to person, place, and time    Labs:  Troponin:  No results for input(s): "TROPONINI" in the last 72 hours.  CBC:  Recent Labs     03/13/24  0414 03/14/24  0409 03/15/24  0417   WBC 16.71  16.71* 15.29* 17.79*   RBC 3.52* 3.54* 3.58*   HGB 10.6* 10.6* 10.9*   HCT 31.1* 31.7* 32.4*   PLT 40* 65* 92*   MCV 88.4 89.5 90.5   MCH 30.1 29.9 30.4   MCHC 34.1 33.4 33.6     CMP:  Recent Labs     03/13/24  0414 03/14/24  0409 03/15/24  0417   CALCIUM 7.3* 7.3* 7.2*   ALBUMIN 1.4* 1.5* 1.3*    138 138   K 3.0* 3.0* 3.7   CO2 32* 32* 33*   BUN 56.1* 39.0* 32.6*   CREATININE 1.34* 1.08 0.99   ALKPHOS 121 134 101   * 136* 84*   * 148* 87*   BILITOT 1.3 1.6* 1.8*     Lactic Acid:  No results for input(s): "LACTATE" in the last 72 hours.  ETOH:  No results for input(s): "ETHANOL" in the last 72 hours.   Urine Drug Screen:  No results for input(s): "COCAINE", "OPIATE", "BARBITURATE", "AMPHETAMINE", "FENTANYL", "CANNABINOIDS", "MDMA" in the last 72 hours.    Invalid input(s): "BENZODIAZEPINE", "PHENCYCLIDINE"   ABG:  No results for input(s): "PH", "PCO2", "PO2", "HCO3", "BE", "POCSATURATED" in the last 72 hours.    Diagnostic Results:  No results found in the last 24 hours.  XR Gastric tube check, non-radiologist " performed   Final Result      XR Gastric tube check, non-radiologist performed   Final Result      CT Abdomen Pelvis With IV Contrast Routine Oral Contrast   Final Result   Abnormal      Findings seen consistent with partial small bowel obstruction.  Follow-up is recommended as complete small-bowel obstruction could be impending.      Findings seen consistent with colonic ileus      Interval development of large volume ascites      Interval development of anasarca edema      Bibasilar atelectasis and bilateral pleural effusions      This report was flagged in Epic as abnormal.         Electronically signed by: Moncho Oneill   Date:    03/12/2024   Time:    14:17      MRI Thoracic Spine Without Contrast   Final Result      Postoperative changes at T11-T12 with chronic appearing cord signal changes and mild canal narrowing.         Electronically signed by: Aye Nath   Date:    03/11/2024   Time:    18:52      MRI Lumbar Spine Without Contrast   Final Result      1. Limited evaluation.   2. Transitional lumbosacral anatomy.   3. Suspected moderate canal stenosis at L1-L4.   4. Multilevel neural foraminal stenoses as described.   5. L2-L3 disc edema with adjacent endplate sclerosis, favored to be degenerative.         Electronically signed by: Aye Nath   Date:    03/11/2024   Time:    18:49      MRI Brain Without Contrast   Final Result      No abnormality seen         Electronically signed by: Moncho Oneill   Date:    03/11/2024   Time:    18:43      US Retroperitoneal Complete   Final Result      1.  No renal abnormality with sonography identified.      2.  Thickened urinary bladder wall.         Electronically signed by: Dontrell Larkin   Date:    03/10/2024   Time:    17:23      X-Ray Abdomen AP 1 View   Final Result      Gaseous filled colon.         Electronically signed by: Dieudonne Mullins MD   Date:    03/10/2024   Time:    16:25      CT Cervical Spine Without Contrast   Final Result      X-Ray Hand 3  view Right   Final Result      No displaced fracture         Electronically signed by: Chevy Santillan MD   Date:    03/09/2024   Time:    10:28      X-Ray Hand 3 view Left   Final Result      No acute osseous findings         Electronically signed by: Chevy Santillan MD   Date:    03/09/2024   Time:    10:29      CT Chest Abdomen Pelvis Without Contrast (XPD)   Final Result      1. No acute abnormality identified within the chest.   2. Emphysematous changes are present.   3. Urinary bladder wall thickening may reflect under distension, cystitis, or chronic hypertrophic changes related to prostatomegaly.   4. Fluid-filled distended stomach.   5. Moderate colonic stool with ample colonic gas.   6. [employer] concordance.         Electronically signed by: Bobby Maguire MD   Date:    03/09/2024   Time:    10:58      CT Head Without Contrast   Final Result      1. No acute intracranial abnormalities identified.   2. [employer] concordance.         Electronically signed by: Bobby Maguire MD   Date:    03/09/2024   Time:    09:56      X-Ray Chest AP Portable   Final Result      No acute findings in the chest         Electronically signed by: Chevy Santillan MD   Date:    03/09/2024   Time:    10:27          ASSESSMENT & PLAN:      Plan:  54M with complex history as listed above, general surgery consulted for pSBO evaluation. Patient is currently having bowel function with normal abdominal exam.    - No acute surgical intervention required  - Agree with NGT  - NPO with NG in place  - Rest of care per primary    Kunal Puri MD  [employer] - General Surgery - PGY 2  3/16/2024 12:04 AM    The above findings, diagnostics, and treatment plan were discussed with the physician who will follow with further assessments and recommendations.

## 2024-03-16 NOTE — PROGRESS NOTES
[facility] Hospital Medicine Progress Note        Chief Complaint: Inpatient Follow-up for severe sepsis    HPI per admitting team: Sebastian Davis is a 54 y.o. male with a PMHx of polysubstance abuse and chronic back pain who presented to [facility] via EMS on 3/9/2024 with c/o generalized weakness and numbness to fingers and toes, slurred speech and numbness to the left side of his face upon awakening at midnight.  Patient last smoked methamphetamine  and marijuana x3 days ago.  He denied any IV drug use, fever, CP. Symptoms apparently resolved prior to arriving in the ED. NIH of 0 and resolution of symptoms therefore stroke workup not obtained initially.  However patient began experiencing these symptoms again while in the ED. He also complains of bilateral lower extremity weakness, numbness.  He also endorsed a fall x1 day ago.  Of note, patient was seen in the ED on 03/05/2024 with complaints of chronic back pain requesting pain medication also noted to have a fever and cough; CXR demonstrated confluent airspace opacities in the left infrahilar region and early infiltrate can not be excluded.  He was discharged home on Augmentin and azithromycin for pneumonia. BLOOD CULTURES X2 FROM 3/6/2024 POSITIVE FOR Streptococcus dysgalactiae ssp equisimilis.  Upon presentation to ED, vital signed included /108, , RR 18, temperature 98.3° F. Labs notable for WBC 18.15, platelets 114, sodium 129, potassium 5.2, chloride 91, CO2 11, BUN 67.5, creatinine 3.82, calcium 7.9, albumin 2.7, , , CPK 10,110.  Lactic acid elevated at 9.4 and repeat 7.6.  Acetaminophen and salicylate level undetectable.  UDS positive for amphetamines, fentanyl and cannabinoids.  Influenza, RSV and COVID-19 negative.  Urinalysis with 2+ protein, trace ketones, 3+ blood, positive nitrites, 2+ bilirubin, 4 urobilinogen, 6-10 RBCs, 21-50 WBCs and moderate bacteria.  Urine creatinine 61.9.  Blood cultures  x2 and urine culture pending. CT head without contrast negative for acute intracranial process.  CT chest abdomen and pelvis without contrast demonstrated nondistended bladder however bladder wall appears thickened which could reflect an element of cystitis; emphysematous changes in bilateral lungs more pronounced in bilateral upper lobes, 4 cm bulla seen in the anterior right upper lobe along the retrosternal region and a 4 mm ground-glass nodule in the left lower lobe; stomach is markedly fluid distended; 3rd duodenal segment is not well delineated possibility of partial bowel obstruction secondary to SMA syndrome is not excluded.  He was started on broad-spectrum IV antibiotics in ED, also received sodium bicarbonate 50 mEq, and calcium gluconate 1 g in ED.  Admitted to hospital medicine service for further medical management.  At the time of exam patient is pale, diaphoretic, tachycardic, dyspneic on supplemental oxygen.  Notified by nurse that lactic acid and CPK are up trending.  ICU nurse, ED nurse and nursing supervisor present at the bedside.  Attending MD notified.  Patient admitted for sepsis with blood cultures already positive from a previous ER visit 2 days before this admission.  Patient was found to have Streptococcus dysgalactiae of the ER visit 3/6/24.  On this admission 03/09/2024 patient was admitted and placed on vancomycin and Zosyn.  Also he had findings of rhabdomyolysis/OKSANA/hypotensive/electrolyte abnormalities.  Patient was fluid resuscitated and vital signs much improved.  He had issues with hypoglycemia in the presence of sepsis that resolved with glucagon and changing fluids to dextrose.  Patient has an anion gap metabolic acidosis with findings of elevated lactic acid/very low CO2/rhabdo and we decided to place him on a sodium bicarb drip.  Overall he is improved.  I reviewed his CT of abdomen since he remains very tender and have decided to place him NPO secondary to fluid-filled  stomach.  We will DC vancomycin and continue Zosyn.  We will continue to replace electrolytes.  His platelets have dropped to around 26,000. We will DC Lovenox workup thrombocytopenia to exclude any causes or than sepsis    3/11/24 Looks improved  from admission . Plt's dropped even further. Peripheral smear with thrombocytopenia with small plt clumps. No active bleeding . Will repeat blood cx's for tomorrow am and will consult ID since pt may need FREEMAN since TTE w poor windows. Hematology consulted for eval of profound thrombocytopenia on a pt with decrease retic count and sepsis. Will start liquid diet . Creatinine is trending down. Ck's at around 3000. Co2  26, will dc sodium b icarbonate drip and continue d5 nss to avoid hypoglycemia . Blood sugars wnl, no further hypoglycemia .  3/12/24 dr hutchinson - looking better/ creat improving as well as plt's. Thrombocytopenia probably related to sepsis. [place]'s at [id number] from [id number].  Creat at 1.5. abdomen is distended w/o rebound. Will do ct w contrast and continue fluids at 125 cc.   Blood cx's repeated . Lactic acid trendingdown   Plaed NG tube-->  obtaining around 2.5 L of bilious fluid.  CT abdomen with IV/oral contrast was done howing partial small-bowel obstruction/ileus/ascites/anasarca/bilateral pleural effusions.  White blood cell count improving as well as LFTs/renal function.  We will continue to replace electrolytes, potassium.  Blood cultures drawn on 03/12/2024 ntd  Now on rocephin 2 grams   4 mm ground-glass left upper lobe nodule  3/12- CT Abd and pelvis with IV contrast, oral contrast shows finding consistent with partial small-bowel obstruction, consistent with colonic ileus, interval development of large volume ascites, and told development of anasarca edema, bibasilar atelectasis with bilateral pleural effusion     Interval Hx:   Patient is laying in bed. Wanting to drink water, discussed only ice chips were allowed, discussed my concern of dilated bowels  but surgery feels NG-tube well decompress the gut  NG Tube still in place   No family at bedside  Case was discussed with patient's nurse on the floor.    Objective/physical exam:  General:  Looks ill, NG tube in place, dried blood on the lips from broken teeth that has been cutting on his tongue for a while now  Chest: Clear to auscultation bilaterally anteriorly  Heart:  Tachycardic rate and rhythm, +S1, S2, no appreciable murmur  Abdomen:  Distended but soft, bowel sounds hypoactive  MSK: Warm, no lower extremity edema, no clubbing or cyanosis  Neurologic: Alert and oriented x4, able to move all 4 extremities but extremely weak    VITAL SIGNS: 24 HRS MIN & MAX LAST   Temp  Min: 98 °F (36.7 °C)  Max: 98.7 °F (37.1 °C) 98 °F (36.7 °C)   BP  Min: 118/77  Max: 121/81 118/77   Pulse  Min: 105  Max: 111  106   Resp  Min: 18  Max: 20 20   SpO2  Min: 94 %  Max: 100 % 100 %     I reviewed the labs below:  Recent Labs   Lab 03/14/24  0409 03/15/24  0417 03/16/24  0638   WBC 15.29* 17.79* 17.50*   RBC 3.54* 3.58* 3.52*   HGB 10.6* 10.9* 10.6*   HCT 31.7* 32.4* 31.0*   MCV 89.5 90.5 88.1   MCH 29.9 30.4 30.1   MCHC 33.4 33.6 34.2   RDW 14.8 14.6 14.6   PLT 65* 92* 119*   MPV 12.5* 14.0* 12.9*       Recent Labs   Lab 03/12/24  0353 03/12/24  1854 03/13/24  0414 03/14/24  0409 03/15/24  0417 03/16/24  0638   *   < > 136 138 138 139   K 2.7*   < > 3.0* 3.0* 3.7 3.3*   CO2 31*   < > 32* 32* 33* 31*   BUN 62.1*   < > 56.1* 39.0* 32.6* 46.8*   CREATININE 1.51*   < > 1.34* 1.08 0.99 1.01   CALCIUM 7.2*   < > 7.3* 7.3* 7.2* 7.4*   MG 2.30  --   --  2.00 1.80  --    ALBUMIN 1.5*  --  1.4* 1.5* 1.3*  --    ALKPHOS 87  --  121 134 101  --    *  --  189* 136* 84*  --    *  --  209* 148* 87*  --    BILITOT 1.5  --  1.3 1.6* 1.8*  --     < > = values in this interval not displayed.       Microbiology Results (last 7 days)       Procedure Component Value Units Date/Time    Blood Culture [[id number]]  (Normal) Collected:  03/12/24 0348    Order Status: Completed Specimen: Blood from Antecubital, Left Updated: 03/16/24 0403     CULTURE, BLOOD ([facility]) No Growth At 96 Hours    Blood Culture [[id number]]  (Normal) Collected: 03/12/24 0353    Order Status: Completed Specimen: Blood from Hand, Left Updated: 03/16/24 0403     CULTURE, BLOOD ([facility]) No Growth At 96 Hours    Blood culture #1 **CANNOT BE ORDERED STAT** [[id number]]  (Abnormal)  (Susceptibility) Collected: 03/09/24 0129    Order Status: Completed Specimen: Blood Updated: 03/11/24 1207     CULTURE, BLOOD ([facility]) Streptococcus dysgalactiae ssp equisimilis     GRAM STAIN 1 of 1 Aerobic bottle positive      Gram Positive Cocci, probable Streptococcus      Seen in gram stain of broth only    Blood culture #2 **CANNOT BE ORDERED STAT** [[id number]]  (Abnormal)  (Susceptibility) Collected: 03/09/24 0129    Order Status: Completed Specimen: Blood Updated: 03/11/24 1152     CULTURE, BLOOD ([facility]) Streptococcus dysgalactiae ssp equisimilis     GRAM STAIN 2 of 2 bottles positive      Gram Positive Cocci, probable Streptococcus      Seen in gram stain of broth only    Urine culture [[id number]] Collected: 03/09/24 0217    Order Status: Completed Specimen: Urine Updated: 03/11/24 0916     Urine Culture No Growth    Stool Culture [[id number]]     Order Status: Sent Specimen: Stool     BCID2 Panel [[id number]]  (Abnormal) Collected: 03/09/24 0129    Order Status: Completed Specimen: Blood Updated: 03/09/24 1705     CTX-M (ESBL ) N/A     IMP (Cabapenemase ) N/A     KPC resistance gene (Carbapenemase ) N/A     mcr-1 N/A     mecA ID N/A     Comment: Note: Antimicrobial resistance can occur via multiple mechanisms. A Not Detected result for antimicrobial resistance gene(s) does not indicate antimicrobial susceptibility. Subculturing is required for species identification and susceptibility testing of   isolates.        mecA/C and MREJ (MRSA) gene N/A     NDM (Carbapenemase  ) N/A     OXA-48-like (Carbapenemase ) N/A     Isela/B (VRE gene) N/A     VIM (Carbapenemase ) N/A     Enterococcus faecalis Not Detected     Enterococcus faecium Not Detected     Listeria monocytogenes Not Detected     Staphylococcus spp. Not Detected     Staphylococcus aureus Not Detected     Staphylococcus epidermidis Not Detected     Staphylococcus lugdunensis Not Detected     Streptococcus spp. Detected     Streptococcus agalactiae (Group B) Not Detected     Streptococcus pneumoniae Not Detected     Streptococcus pyogenes (Group A) Not Detected     Acinetobacter calcoaceticus/baumannii complex Not Detected     Bacteroides fragilis Not Detected     Enterobacterales Not Detected     Enterobacter cloacae complex Not Detected     Escherichia coli Not Detected     Klebsiella aerogenes Not Detected     Klebsiella oxytoca Not Detected     Klebsiella pneumoniae group Not Detected     Proteus spp. Not Detected     Salmonella spp. Not Detected     Serratia marcescens Not Detected     Haemophilus influenzae Not Detected     Neisseria meningitidis Not Detected     Pseudomonas aeruginosa Not Detected     Stenotrophomonas maltophilia Not Detected     Candida albicans Not Detected     Candida auris Not Detected     Candida glabrata Not Detected     Candida krusei Not Detected     Candida parapsilosis Not Detected     Candida tropicalis Not Detected     Cryptococcus neoformans/gattii Not Detected    Narrative:      The [employer] BCID2 Panel is a multiplexed nucleic acid test intended for the use with [employer]® 2.0 or [employer]® [employer] Systems for the simultaneous qualitative detection and identification of multiple bacterial and yeast nucleic acids and select genetic determinants associated with antimicrobial resistance.  The [employer] BCID2 Panel test is performed directly on blood culture samples identified as positive by a continuous monitoring blood culture system.  Results are intended  to be interpreted in conjunction with Gram stain results.             See below for Radiology    Scheduled Med:   cefTRIAXone (Rocephin) IV (PEDS and ADULTS)  2 g Intravenous Q24H    folic acid  1 mg Oral Daily    nystatin  500,000 Units Oral QID      Continuous Infusions:   Amino acid 4.25% - dextrose 5% (CLINIMIX-E) solution (1L provides 42.5 gm AA, 50 gm CHO (170 kcal/L dextrose), Na 35, K 30, Mg 5, Ca 4.5, Acetate 70, Cl 39, Phos 15) 65 mL/hr at 03/15/24 2028    dextrose 5 % and 0.9 % NaCl 60 mL/hr at 03/15/24 1622      PRN Meds:  acetaminophen, aluminum-magnesium hydroxide-simethicone, glucagon (human recombinant), HYDROmorphone, levalbuterol, melatonin, ondansetron, polyethylene glycol, prochlorperazine, senna-docusate 8.6-50 mg, sodium chloride 0.9%     Assessment/Plan:  Severe sepsis secondary to Streptococcus dysgalactiae   Left lower extremity cellulitis   Acute hypoxemic respiratory failure secondary to bilateral pleural effusions  Partial bowel obstruction secondary to SMA syndrome vs SBO- npow with dilated Small bowel loop upto 5.7cm  OKSANA - resolved  Lactic acidosis- resolved  Rhabdomyolysis- resolved  Transaminitis- trending down   Leukocytosis- persist  Hyponatremia/Hypochloremia - resolved  Severe hypokalemia - persist  Hypocalcemia -persist  Intractable hypoglycemia- resolved  Abd pain / small bowel obstruction /ileus  Ascites/anasarca  Thrombocytopenia  Polysubstance abuse-UDS positive for amphetamine, fentanyl, cannabinoids  Tobacco abuse   Chronic back pain      ID DR RILEY on board, appreciate recs, recommended may need FREEMAN to r/o thromboembolic phenomena once stable  Zosyn switched to rocephin 2 grams daily to prevent nephrotoxicity, duration of treatment to be determined by ID  WBC  17 K  03/12- repeat Blood Cultures x2- negative at 72 hours.  Concern for partial small bowel obstruction/ileus--> per dr Overton note, general surgery evaluated the patient- no intervention, I cannot find the consult  note  Reconsulted General surgery given x-ray shows small loop dilation of 5.7 cm now--> appreciate recommendation, recommended to decompress with NG tube which maybe moved shortly.  Recommended to keep NPO for now.  Unlikely mechanical bowel obstruction based on history and imaging  Continue NPO  Continue NG tube low intermittent suction  Cont Clinimix E at 65 cc/hour- day 2  Decrease IV fluids to 60 cc/hour  Hep C Ab +, tx naive- ID aware  Gi pcr- pending collection, now the patient is having bowel movement, requested to be collected along with fecal leukocyte/lactoferrin all stool (initially ordered on 03/09)  Appreciate assistance from hematology for his persistent thrombocytopenia, recommended to hold anticoagulation till platelets are greater than 50,000, transfuse 1 unit platelet if platelet drops to less than 10,000 or started bleeding.  Recommend SCDs for VTE prevention in the interim  Received calcium gluconate x 1 on 3/10--> corrected calcium to albumin is 9.6  Potassium again dropped to 3.3, ordered KCl 40 mEq IV x1  AST/ALT 87/84, trending down  Overall, condition is guarded.  Appreciate all consultants with assist in his case  Morning CBC, cMP, mag ordered    VTE prophylaxis:    SCDs, avoid chem prophylaxis due to thrombocytopenia                      Patient condition:  Guarded    Anticipated discharge and Disposition:   TBD    Critical care note:  Critical care diagnosis:  Prolonged NPO requiring Clinimix infusion  Critical care interventions: Hands-on evaluation, review of labs/radiographs/records and discussion with patient and family if present  Critical care time spent: 35 minutes    Around 4:50 p.m., nurse message that patient is wanting to drink water or he is threatening to leave AMA.  Informed nurse that surgery also recommended NPO status and his small bowel is dilated and we do not want complications from drinking water on top of the dilated small bowel.  Encourage patient to stay  compliant.  Probably NG tube will be out in next 1-2 days and then we can start him with clear liquids.  Patient has finally come down and agreed to stay  Ordered magic mouthwash patient given he complained of pain in his from where the broken deep rubbing against his mucosa and tongue        All diagnosis and differential diagnosis have been reviewed; assessment and plan has been documented; I have personally reviewed the labs and test results that are presently available; I have reviewed the patients medication list; I have reviewed the consulting providers response and recommendations. I have reviewed or attempted to review medical records based upon their availability    All of the patient's questions have been  addressed and answered. Patient's is agreeable to the above stated plan. I will continue to monitor closely and make adjustments to medical management as needed.  _____________________________________________________________________    Nutrition Status:  -  Radiology:   I have personally reviewed the images and agree with radiologist report  XR Gastric tube check, non-radiologist performed  EXAMINATION:  XR GASTRIC TUBE CHECK, NON-RADIOLOGIST PERFORMED    CLINICAL HISTORY:  pt pulled out resinserted;    TECHNIQUE:  AP View(s) of the abdomen was performed.    COMPARISON:  None.    FINDINGS:  Enteric tube terminates in the stomach.    Partially imaged gas dilated small bowel loops measuring up to 5.7 cm in diameter.    Electronically signed by: Kendal Emery  Date:    03/15/2024  Time:    16:31      Aniceto Montero MD  Department of Hospital Medicine   [facility]   03/16/2024

## 2024-03-17 VITALS
HEIGHT: 70 IN | TEMPERATURE: 98 F | BODY MASS INDEX: 25.77 KG/M2 | RESPIRATION RATE: 20 BRPM | DIASTOLIC BLOOD PRESSURE: 80 MMHG | WEIGHT: 180 LBS | SYSTOLIC BLOOD PRESSURE: 115 MMHG | OXYGEN SATURATION: 100 % | HEART RATE: 101 BPM

## 2024-03-17 LAB
ADV 40+41 DNA STL QL NAA+NON-PROBE: NOT DETECTED
ALBUMIN SERPL-MCNC: 1.3 G/DL (ref 3.5–5)
ALBUMIN/GLOB SERPL: 0.3 RATIO (ref 1.1–2)
ALP SERPL-CCNC: 99 UNIT/L (ref 40–150)
ALT SERPL-CCNC: 68 UNIT/L (ref 0–55)
AST SERPL-CCNC: 110 UNIT/L (ref 5–34)
ASTRO TYP 1-8 RNA STL QL NAA+NON-PROBE: NOT DETECTED
BACTERIA BLD CULT: NORMAL
BACTERIA BLD CULT: NORMAL
BASOPHILS # BLD AUTO: 0.06 X10(3)/MCL
BASOPHILS NFR BLD AUTO: 0.4 %
BILIRUB SERPL-MCNC: 1.9 MG/DL
BUN SERPL-MCNC: 46.7 MG/DL (ref 8.4–25.7)
C CAYETANENSIS DNA STL QL NAA+NON-PROBE: NOT DETECTED
C COLI+JEJ+UPSA DNA STL QL NAA+NON-PROBE: NOT DETECTED
CALCIUM SERPL-MCNC: 7.3 MG/DL (ref 8.4–10.2)
CHLORIDE SERPL-SCNC: 98 MMOL/L (ref 98–107)
CO2 SERPL-SCNC: 37 MMOL/L (ref 22–29)
CREAT SERPL-MCNC: 0.89 MG/DL (ref 0.73–1.18)
CRYPTOSP DNA STL QL NAA+NON-PROBE: NOT DETECTED
E HISTOLYT DNA STL QL NAA+NON-PROBE: NOT DETECTED
EAEC PAA PLAS AGGR+AATA ST NAA+NON-PRB: NOT DETECTED
EC STX1+STX2 GENES STL QL NAA+NON-PROBE: NOT DETECTED
EOSINOPHIL # BLD AUTO: 0.02 X10(3)/MCL (ref 0–0.9)
EOSINOPHIL NFR BLD AUTO: 0.1 %
EPEC EAE GENE STL QL NAA+NON-PROBE: NOT DETECTED
ERYTHROCYTE [DISTWIDTH] IN BLOOD BY AUTOMATED COUNT: 14.6 % (ref 11.5–17)
ETEC LTA+ST1A+ST1B TOX ST NAA+NON-PROBE: NOT DETECTED
FECAL LEUKOCYTE (OHS): POSITIVE
G LAMBLIA DNA STL QL NAA+NON-PROBE: NOT DETECTED
GFR SERPLBLD CREATININE-BSD FMLA CKD-EPI: >60 MLS/MIN/1.73/M2
GLOBULIN SER-MCNC: 4.2 GM/DL (ref 2.4–3.5)
GLUCOSE SERPL-MCNC: 124 MG/DL (ref 74–100)
HCT VFR BLD AUTO: 29.3 % (ref 42–52)
HGB BLD-MCNC: 9.7 G/DL (ref 14–18)
IMM GRANULOCYTES # BLD AUTO: 0.16 X10(3)/MCL (ref 0–0.04)
IMM GRANULOCYTES NFR BLD AUTO: 1.1 %
LYMPHOCYTES # BLD AUTO: 0.48 X10(3)/MCL (ref 0.6–4.6)
LYMPHOCYTES NFR BLD AUTO: 3.4 %
MAGNESIUM SERPL-MCNC: 2.2 MG/DL (ref 1.6–2.6)
MCH RBC QN AUTO: 30.2 PG (ref 27–31)
MCHC RBC AUTO-ENTMCNC: 33.1 G/DL (ref 33–36)
MCV RBC AUTO: 91.3 FL (ref 80–94)
MONOCYTES # BLD AUTO: 0.53 X10(3)/MCL (ref 0.1–1.3)
MONOCYTES NFR BLD AUTO: 3.8 %
NEUTROPHILS # BLD AUTO: 12.67 X10(3)/MCL (ref 2.1–9.2)
NEUTROPHILS NFR BLD AUTO: 91.2 %
NOROVIRUS GI+II RNA STL QL NAA+NON-PROBE: NOT DETECTED
NRBC BLD AUTO-RTO: 0 %
P SHIGELLOIDES DNA STL QL NAA+NON-PROBE: NOT DETECTED
PLATELET # BLD AUTO: 166 X10(3)/MCL (ref 130–400)
PMV BLD AUTO: 12.9 FL (ref 7.4–10.4)
POCT GLUCOSE: 118 MG/DL (ref 70–110)
POCT GLUCOSE: 127 MG/DL (ref 70–110)
POTASSIUM SERPL-SCNC: 3 MMOL/L (ref 3.5–5.1)
PROT SERPL-MCNC: 5.5 GM/DL (ref 6.4–8.3)
RBC # BLD AUTO: 3.21 X10(6)/MCL (ref 4.7–6.1)
RVA RNA STL QL NAA+NON-PROBE: NOT DETECTED
S ENT+BONG DNA STL QL NAA+NON-PROBE: NOT DETECTED
SAPO I+II+IV+V RNA STL QL NAA+NON-PROBE: NOT DETECTED
SHIGELLA SP+EIEC IPAH ST NAA+NON-PROBE: NOT DETECTED
SODIUM SERPL-SCNC: 144 MMOL/L (ref 136–145)
V CHOL+PARA+VUL DNA STL QL NAA+NON-PROBE: NOT DETECTED
V CHOLERAE DNA STL QL NAA+NON-PROBE: NOT DETECTED
WBC # SPEC AUTO: 13.92 X10(3)/MCL (ref 4.5–11.5)
Y ENTEROCOL DNA STL QL NAA+NON-PROBE: NOT DETECTED

## 2024-03-17 PROCEDURE — 63600175 PHARM REV CODE 636 W HCPCS: Performed by: INTERNAL MEDICINE

## 2024-03-17 PROCEDURE — 83735 ASSAY OF MAGNESIUM: CPT | Performed by: INTERNAL MEDICINE

## 2024-03-17 PROCEDURE — 25000003 PHARM REV CODE 250: Performed by: INTERNAL MEDICINE

## 2024-03-17 PROCEDURE — 85025 COMPLETE CBC W/AUTO DIFF WBC: CPT | Performed by: INTERNAL MEDICINE

## 2024-03-17 PROCEDURE — 89055 LEUKOCYTE ASSESSMENT FECAL: CPT | Performed by: INTERNAL MEDICINE

## 2024-03-17 PROCEDURE — 87045 FECES CULTURE AEROBIC BACT: CPT | Performed by: INTERNAL MEDICINE

## 2024-03-17 PROCEDURE — 80053 COMPREHEN METABOLIC PANEL: CPT | Performed by: INTERNAL MEDICINE

## 2024-03-17 PROCEDURE — 87507 IADNA-DNA/RNA PROBE TQ 12-25: CPT | Performed by: INTERNAL MEDICINE

## 2024-03-17 RX ORDER — POTASSIUM CHLORIDE 14.9 MG/ML
20 INJECTION INTRAVENOUS
Status: DISCONTINUED | OUTPATIENT
Start: 2024-03-17 | End: 2024-03-17 | Stop reason: HOSPADM

## 2024-03-17 RX ORDER — ENOXAPARIN SODIUM 100 MG/ML
40 INJECTION SUBCUTANEOUS EVERY 24 HOURS
Status: DISCONTINUED | OUTPATIENT
Start: 2024-03-17 | End: 2024-03-17 | Stop reason: HOSPADM

## 2024-03-17 RX ORDER — METOPROLOL TARTRATE 25 MG/1
12.5 TABLET ORAL 2 TIMES DAILY
Status: DISCONTINUED | OUTPATIENT
Start: 2024-03-17 | End: 2024-03-17 | Stop reason: HOSPADM

## 2024-03-17 RX ADMIN — NYSTATIN 500000 UNITS: 100000 SUSPENSION ORAL at 01:03

## 2024-03-17 RX ADMIN — NYSTATIN 500000 UNITS: 100000 SUSPENSION ORAL at 10:03

## 2024-03-17 RX ADMIN — CEFTRIAXONE SODIUM 2 G: 2 INJECTION, POWDER, FOR SOLUTION INTRAMUSCULAR; INTRAVENOUS at 04:03

## 2024-03-17 RX ADMIN — ENOXAPARIN SODIUM 40 MG: 40 INJECTION SUBCUTANEOUS at 04:03

## 2024-03-17 RX ADMIN — DEXTROSE AND SODIUM CHLORIDE: 5; 900 INJECTION, SOLUTION INTRAVENOUS at 06:03

## 2024-03-17 RX ADMIN — HYDROMORPHONE HYDROCHLORIDE 0.5 MG: 2 INJECTION INTRAMUSCULAR; INTRAVENOUS; SUBCUTANEOUS at 02:03

## 2024-03-17 RX ADMIN — NYSTATIN 500000 UNITS: 100000 SUSPENSION ORAL at 04:03

## 2024-03-17 RX ADMIN — POTASSIUM CHLORIDE 20 MEQ: 14.9 INJECTION, SOLUTION INTRAVENOUS at 04:03

## 2024-03-17 RX ADMIN — LEUCINE, PHENYLALANINE, LYSINE, METHIONINE, ISOLEUCINE, VALINE, HISTIDINE, THREONINE, TRYPTOPHAN, ALANINE, GLYCINE, ARGININE, PROLINE, SERINE, TYROSINE, SODIUM ACETATE, DIBASIC POTASSIUM PHOSPHATE, MAGNESIUM CHLORIDE, SODIUM CHLORIDE, CALCIUM CHLORIDE, DEXTROSE
311; 238; 247; 170; 255; 247; 204; 179; 77; 880; 438; 489; 289; 213; 17; 297; 261; 51; 77; 33; 5 INJECTION INTRAVENOUS at 07:03

## 2024-03-17 NOTE — DISCHARGE SUMMARY
[facility] Hospital Medicine LEAVING AGAINST MEDICAL ADVISE  Summary    Admit Date: 3/9/2024  AMA and Time: 3/17/57185:07 PM  Admitting Physician: DG Team  Discharging Physician: Aniceto Montero MD.  Primary Care Physician: Devika, Primary Doctor  Consults: General Surgery, Hematology/Oncology, and Infectious Disease    Diagnoses at the time of leaving against medical advise:  Severe sepsis secondary to Streptococcus dysgalactiae   Left lower extremity cellulitis   Acute hypoxemic respiratory failure secondary to bilateral pleural effusions  Sinus tachycardia   Partial bowel obstruction secondary to SMA syndrome vs SBO- now with dilated Small bowel loop upto 5.7cm  OKSANA - resolved  Lactic acidosis- resolved  Rhabdomyolysis- resolved  Transaminitis- trending down   Leukocytosis- persist  Hyponatremia/Hypochloremia - resolved  Severe hypokalemia - persist  Hypocalcemia -persist  Intractable hypoglycemia- resolved  Abd pain / small bowel obstruction /ileus  Ascites/anasarca  Thrombocytopenia- resolved  Polysubstance abuse-UDS positive for amphetamine, fentanyl, cannabinoids  Tobacco abuse   Chronic back pain  Moderate malnutrition    Hospital Course:   Sebastian Davis is a 54 y.o. male with a PMHx of polysubstance abuse and chronic back pain who presented to [facility] via EMS on 3/9/2024 with c/o generalized weakness and numbness to fingers and toes, slurred speech and numbness to the left side of his face upon awakening at midnight.  Patient last smoked methamphetamine  and marijuana x3 days ago.  He denied any IV drug use, fever, CP. Symptoms apparently resolved prior to arriving in the ED. NIH of 0 and resolution of symptoms therefore stroke workup not obtained initially.  However patient began experiencing these symptoms again while in the ED. He also complains of bilateral lower extremity weakness, numbness.  He also endorsed a fall x1 day ago.  Of note, patient was seen in the ED on 03/05/2024  with complaints of chronic back pain requesting pain medication also noted to have a fever and cough; CXR demonstrated confluent airspace opacities in the left infrahilar region and early infiltrate can not be excluded.  He was discharged home on Augmentin and azithromycin for pneumonia. BLOOD CULTURES X2 FROM 3/6/2024 POSITIVE FOR Streptococcus dysgalactiae ssp equisimilis.  Upon presentation to ED, vital signed included /108, , RR 18, temperature 98.3° F. Labs notable for WBC 18.15, platelets 114, sodium 129, potassium 5.2, chloride 91, CO2 11, BUN 67.5, creatinine 3.82, calcium 7.9, albumin 2.7, , , CPK 10,110.  Lactic acid elevated at 9.4 and repeat 7.6.  Acetaminophen and salicylate level undetectable.  UDS positive for amphetamines, fentanyl and cannabinoids.  Influenza, RSV and COVID-19 negative.  Urinalysis with 2+ protein, trace ketones, 3+ blood, positive nitrites, 2+ bilirubin, 4 urobilinogen, 6-10 RBCs, 21-50 WBCs and moderate bacteria.  Urine creatinine 61.9.  Blood cultures x2 and urine culture pending. CT head without contrast negative for acute intracranial process.  CT chest abdomen and pelvis without contrast demonstrated nondistended bladder however bladder wall appears thickened which could reflect an element of cystitis; emphysematous changes in bilateral lungs more pronounced in bilateral upper lobes, 4 cm bulla seen in the anterior right upper lobe along the retrosternal region and a 4 mm ground-glass nodule in the left lower lobe; stomach is markedly fluid distended; 3rd duodenal segment is not well delineated possibility of partial bowel obstruction secondary to SMA syndrome is not excluded.  He was started on broad-spectrum IV antibiotics in ED, also received sodium bicarbonate 50 mEq, and calcium gluconate 1 g in ED.  Admitted to hospital medicine service for further medical management.  At the time of exam patient is pale, diaphoretic, tachycardic, dyspneic on  supplemental oxygen.  Notified by nurse that lactic acid and CPK are up trending.  ICU nurse, ED nurse and nursing supervisor present at the bedside.  Attending MD notified.  Patient admitted for sepsis with blood cultures already positive from a previous ER visit 2 days before this admission.  Patient was found to have Streptococcus dysgalactiae of the ER visit 3/6/24.  On this admission 03/09/2024 patient was admitted and placed on vancomycin and Zosyn.  Also he had findings of rhabdomyolysis/OKSANA/hypotensive/electrolyte abnormalities.  Patient was fluid resuscitated and vital signs much improved.  He had issues with hypoglycemia in the presence of sepsis that resolved with glucagon and changing fluids to dextrose.  Patient has an anion gap metabolic acidosis with findings of elevated lactic acid/very low CO2/rhabdo and we decided to place him on a sodium bicarb drip.  Overall he is improved.  I reviewed his CT of abdomen since he remains very tender and have decided to place him NPO secondary to fluid-filled stomach.  We will DC vancomycin and continue Zosyn.  We will continue to replace electrolytes.  His platelets have dropped to around 26,000. We will DC Lovenox workup thrombocytopenia to exclude any causes or than sepsis    3/11/24 Looks improved  from admission . Plt's dropped even further. Peripheral smear with thrombocytopenia with small plt clumps. No active bleeding . Will repeat blood cx's for tomorrow am and will consult ID since pt may need FREEMAN since TTE w poor windows. Hematology consulted for eval of profound thrombocytopenia on a pt with decrease retic count and sepsis. Will start liquid diet . Creatinine is trending down. Ck's at around 3000. Co2  26, will dc sodium b icarbonate drip and continue d5 nss to avoid hypoglycemia . Blood sugars wnl, no further hypoglycemia .  3/12/24 dr hutchinson - looking better/ creat improving as well as plt's. Thrombocytopenia probably related to sepsis. Plt's at 66909  from [address]00.  Creat at 1.5. abdomen is distended w/o rebound. Will do ct w contrast and continue fluids at 125 cc.   Blood cx's repeated . Lactic acid trendingdown   Plaed NG tube-->  obtaining around 2.5 L of bilious fluid.  CT abdomen with IV/oral contrast was done howing partial small-bowel obstruction/ileus/ascites/anasarca/bilateral pleural effusions.  White blood cell count improving as well as LFTs/renal function.  We will continue to replace electrolytes, potassium.  Blood cultures drawn on 03/12/2024 ntd  Now on rocephin 2 grams   4 mm ground-glass left upper lobe nodule  3/12- CT Abd and pelvis with IV contrast, oral contrast shows finding consistent with partial small-bowel obstruction, consistent with colonic ileus, interval development of large volume ascites, and told development of anasarca edema, bibasilar atelectasis with bilateral pleural effusion  ID DR RILEY on board, appreciate recs, recommended may need FREEMAN to r/o thromboembolic phenomena once stable  Zosyn switched to rocephin 2 grams daily to prevent nephrotoxicity, duration of treatment to be determined by Dr RILEY  WBC  still elevated at 17 K  03/12- repeat Blood Cultures x2- negative at day 5  Concern for partial small bowel obstruction/ileus--> per dr Overton note, general surgery evaluated the patient- no intervention, I cannot find the consult note  Reconsulted General surgery given x-ray shows small loop dilation of 5.7 cm now--> appreciate recommendation, recommended to decompress with NG tube which maybe moved shortly.  Recommended to keep NPO for now.  Unlikely mechanical bowel obstruction based on history and imaging  Continue NPO  Continue NG tube low intermittent suction- so far 1100 cc in suction cup by 12 pm  Cont Clinimix E at 65 cc/hour- day 3  Decrease IV fluids to 60 cc/hour  Patient has persistent hypokalemia, ordered KCl 40 mEq IV x1 again  Tachycardia persist, EKG on 3/15- again shows sinus tachycardia.  Will start metoprolol  12.5 b.i.d. once patient able to take orally.  Currently NPO and with NG tube suction  Hep C Ab +, tx naive- ID aware  Gi pcr- negative  Having multiple bowel leaks, not significant to call it a bowel movement  Appreciate assistance from hematology for his persistent thrombocytopenia, recommended to hold anticoagulation till platelets are greater than 50,000, transfuse 1 unit platelet if platelet drops to less than 10,000 or started bleeding.  Recommend SCDs for VTE prevention in the interim, noted today platelets 166, will start Lovenox 40 mg subQ daily--> monitor platelets closely  Received calcium gluconate x 1 on 3/10--> corrected calcium to albumin is 9.6  AST/ALT again trended up to 110/68  Overall, condition is guarded.  Appreciate all consultants with assist in his case  3/17- I had a long conversation with the patient regarding his attitude yesterday as he was threatening the nurse that he is leaving AMA because nobody is giving him water to drink.  Explain to patient what is going on with his abdomen and why the NG tube is in.  Informed patient that we can only help him if he will not us to help him.  Patient reported he will stay put and comply with the recommendations  NG Tube still in place, general surgery on board  No family at bedside, patient has been made VIP with no visitors allowed as his wife was called on the camera stealing from [address] from the nurse's station, and yet she was able to come to the floor around 4:30 and demanded discharge, informed that patient is not ready for discharge as he has not medically stable  Patient's wife reported she wants to take him to another hospital.  Verbalized understanding of the complication he can get including death from leaving against medical advise and eating and drinking against recommendation.  She is still decided to take him against medical advise and they left the floor around 5:00 p.m.      Vitals:  VITAL SIGNS: 24 HRS MIN & MAX LAST   Temp  Min:  97.2 °F (36.2 °C)  Max: 98.3 °F (36.8 °C) 98.3 °F (36.8 °C)   BP  Min: 112/71  Max: 120/82 115/80   Pulse  Min: 101  Max: 111  101   Resp  Min: 18  Max: 20 20   SpO2  Min: 96 %  Max: 100 % 100 %       Physical Exam:  General:  Looks ill, NG tube in place  Chest: Clear to auscultation bilaterally anteriorly  Heart:  Tachycardic rate and rhythm, +S1, S2, no appreciable murmur  Abdomen:  Distended but soft, bowel sounds hypoactive  MSK: Warm, no lower extremity edema, no clubbing or cyanosis  Neurologic: Alert and oriented x4, able to move all 4 extremities but extremely weak    Recent Labs   Lab 03/15/24  0417 03/16/24  0638 03/17/24  0511   WBC 17.79* 17.50* 13.92*   RBC 3.58* 3.52* 3.21*   HGB 10.9* 10.6* 9.7*   HCT 32.4* 31.0* 29.3*   MCV 90.5 88.1 91.3   MCH 30.4 30.1 30.2   MCHC 33.6 34.2 33.1   RDW 14.6 14.6 14.6   PLT 92* 119* 166   MPV 14.0* 12.9* 12.9*       Recent Labs   Lab 03/14/24  0409 03/15/24  0417 03/16/24  0638 03/17/24  0511    138 139 144   K 3.0* 3.7 3.3* 3.0*   CO2 32* 33* 31* 37*   BUN 39.0* 32.6* 46.8* 46.7*   CREATININE 1.08 0.99 1.01 0.89   CALCIUM 7.3* 7.2* 7.4* 7.3*   MG 2.00 1.80  --  2.20   ALBUMIN 1.5* 1.3*  --  1.3*   ALKPHOS 134 101  --  99   * 84*  --  68*   * 87*  --  110*   BILITOT 1.6* 1.8*  --  1.9*        Microbiology Results (last 7 days)       Procedure Component Value Units Date/Time    Stool Culture [[id number]] Collected: 03/17/24 0738    Order Status: Sent Specimen: Stool Updated: 03/17/24 0738    Blood Culture [[id number]]  (Normal) Collected: 03/12/24 0348    Order Status: Completed Specimen: Blood from Antecubital, Left Updated: 03/17/24 0500     CULTURE, BLOOD ([facility]) No Growth at 5 days    Blood Culture [[id number]]  (Normal) Collected: 03/12/24 0353    Order Status: Completed Specimen: Blood from Hand, Left Updated: 03/17/24 0500     CULTURE, BLOOD ([facility]) No Growth at 5 days    Blood culture #1 **CANNOT BE ORDERED STAT** [[id number]]  (Abnormal)   (Susceptibility) Collected: 03/09/24 0129    Order Status: Completed Specimen: Blood Updated: 03/11/24 1207     CULTURE, BLOOD ([facility]) Streptococcus dysgalactiae ssp equisimilis     GRAM STAIN 1 of 1 Aerobic bottle positive      Gram Positive Cocci, probable Streptococcus      Seen in gram stain of broth only    Blood culture #2 **CANNOT BE ORDERED STAT** [[id number]]  (Abnormal)  (Susceptibility) Collected: 03/09/24 0129    Order Status: Completed Specimen: Blood Updated: 03/11/24 1152     CULTURE, BLOOD ([facility]) Streptococcus dysgalactiae ssp equisimilis     GRAM STAIN 2 of 2 bottles positive      Gram Positive Cocci, probable Streptococcus      Seen in gram stain of broth only    Urine culture [[id number]] Collected: 03/09/24 0217    Order Status: Completed Specimen: Urine Updated: 03/11/24 0916     Urine Culture No Growth             XR Gastric tube check, non-radiologist performed  EXAMINATION:  XR GASTRIC TUBE CHECK, NON-RADIOLOGIST PERFORMED    CLINICAL HISTORY:  pt pulled out resinserted;    TECHNIQUE:  AP View(s) of the abdomen was performed.    COMPARISON:  None.    FINDINGS:  Enteric tube terminates in the stomach.    Partially imaged gas dilated small bowel loops measuring up to 5.7 cm in diameter.    Electronically signed by: Kendal Emery  Date:    03/15/2024  Time:    16:31         Medication List        ASK your doctor about these medications      albuterol 90 mcg/actuation inhaler  Commonly known as: PROVENTIL/VENTOLIN HFA  Inhale 1-2 puffs into the lungs every 6 (six) hours as needed for Wheezing. Rescue     amoxicillin-clavulanate 875-125mg 875-125 mg per tablet  Commonly known as: AUGMENTIN  Take 1 tablet by mouth 2 (two) times daily. for 7 days  Ask about: Should I take this medication?     azithromycin 250 MG tablet  Commonly known as: Z-TAYLOR  Take 1 tablet (250 mg total) by mouth once daily. Take first 2 tablets together, then 1 every day until finished.     cetirizine 10 MG tablet  Commonly  known as: ZYRTEC  Take 1 tablet (10 mg total) by mouth once daily.     gabapentin 300 MG capsule  Commonly known as: NEURONTIN     HYDROcodone-acetaminophen 5-325 mg per tablet  Commonly known as: NORCO  Take 1 tablet by mouth every 6 (six) hours as needed for Pain.     naproxen 500 MG tablet  Commonly known as: NAPROSYN  Take 1 tablet (500 mg total) by mouth 2 (two) times daily with meals.     promethazine-dextromethorphan 6.25-15 mg/5 mL Syrp  Commonly known as: PROMETHAZINE-DM  Take 5 mLs by mouth every 6 (six) hours as needed (cough).     ZANAFLEX ORAL               Explained in detail to the patient and family the risk and complication of leaving against medical advice when the treatment is not completed.  They    Diet-   Dietary Orders (From admission, onward)       Start     Ordered    03/13/24 1254  Diet NPO Except for: Ice Chips  Diet effective now        Question:  Except for  Answer:  Ice Chips    03/13/24 1253                   For further questions contact hospitalist office    Discharge time 34 minutes    For worsening symptoms, chest pain, shortness of breath, increased abdominal pain, high grade fever, stroke or stroke like symptoms, immediately go to the nearest Emergency Room or call 911 as soon as possible.      Aniceto Montero MD  Department of Hospital Medicine   [facility]   03/17/2024 5:07 PM

## 2024-03-17 NOTE — NURSING
"RN called to  by another nurse. Upon arrival to nurse's station, patient's spouse was at the desk stating, "I would like to sign my  out of the hospital. He called me this morning saying he wanted to leave because he couldn't have anything to eat or drink and he wanted to go to another hospital." RN called security since the patient was not allowed to have any visitors due to family member having an incident with staff a couple nights ago. Security arrived. Situation explained to security. House supervisor notified. Attending MD, Dr. Montero notified. RN explained to spouse that we would have to clarify with patient that these are his wishes and not hers. She said, "that is fine, I'll wait here while you speak with him." RN entered patient room with security. RN asked patient if he called his wife this morning. Patient stated, "Yes, I called her to come get me because yall wont give me nothing to eat or drink and I wanna go somewhere else." RN asked, "Where do you want to go?" The patient stated, "I wanna go to another hospital. RN advised patients of risks/complications that can arise when leaving against medical advice including but not limited to hypoxia, respiratory failure, worsening of current condition, and death. Patient stated,"Im okay with that I just want to get out of here." AMA forms given to patient to sign. VSS at time of patient leaving. IVs, Tele, and NGT removed. Patient left hospital with wife.  "

## 2024-03-17 NOTE — PROGRESS NOTES
[facility] Hospital Medicine Progress Note        Chief Complaint: Inpatient Follow-up for severe sepsis    HPI per admitting team: Sebastian Davis is a 54 y.o. male with a PMHx of polysubstance abuse and chronic back pain who presented to [facility] via EMS on 3/9/2024 with c/o generalized weakness and numbness to fingers and toes, slurred speech and numbness to the left side of his face upon awakening at midnight.  Patient last smoked methamphetamine  and marijuana x3 days ago.  He denied any IV drug use, fever, CP. Symptoms apparently resolved prior to arriving in the ED. NIH of 0 and resolution of symptoms therefore stroke workup not obtained initially.  However patient began experiencing these symptoms again while in the ED. He also complains of bilateral lower extremity weakness, numbness.  He also endorsed a fall x1 day ago.  Of note, patient was seen in the ED on 03/05/2024 with complaints of chronic back pain requesting pain medication also noted to have a fever and cough; CXR demonstrated confluent airspace opacities in the left infrahilar region and early infiltrate can not be excluded.  He was discharged home on Augmentin and azithromycin for pneumonia. BLOOD CULTURES X2 FROM 3/6/2024 POSITIVE FOR Streptococcus dysgalactiae ssp equisimilis.  Upon presentation to ED, vital signed included /108, , RR 18, temperature 98.3° F. Labs notable for WBC 18.15, platelets 114, sodium 129, potassium 5.2, chloride 91, CO2 11, BUN 67.5, creatinine 3.82, calcium 7.9, albumin 2.7, , , CPK 10,110.  Lactic acid elevated at 9.4 and repeat 7.6.  Acetaminophen and salicylate level undetectable.  UDS positive for amphetamines, fentanyl and cannabinoids.  Influenza, RSV and COVID-19 negative.  Urinalysis with 2+ protein, trace ketones, 3+ blood, positive nitrites, 2+ bilirubin, 4 urobilinogen, 6-10 RBCs, 21-50 WBCs and moderate bacteria.  Urine creatinine 61.9.  Blood cultures  x2 and urine culture pending. CT head without contrast negative for acute intracranial process.  CT chest abdomen and pelvis without contrast demonstrated nondistended bladder however bladder wall appears thickened which could reflect an element of cystitis; emphysematous changes in bilateral lungs more pronounced in bilateral upper lobes, 4 cm bulla seen in the anterior right upper lobe along the retrosternal region and a 4 mm ground-glass nodule in the left lower lobe; stomach is markedly fluid distended; 3rd duodenal segment is not well delineated possibility of partial bowel obstruction secondary to SMA syndrome is not excluded.  He was started on broad-spectrum IV antibiotics in ED, also received sodium bicarbonate 50 mEq, and calcium gluconate 1 g in ED.  Admitted to hospital medicine service for further medical management.  At the time of exam patient is pale, diaphoretic, tachycardic, dyspneic on supplemental oxygen.  Notified by nurse that lactic acid and CPK are up trending.  ICU nurse, ED nurse and nursing supervisor present at the bedside.  Attending MD notified.  Patient admitted for sepsis with blood cultures already positive from a previous ER visit 2 days before this admission.  Patient was found to have Streptococcus dysgalactiae of the ER visit 3/6/24.  On this admission 03/09/2024 patient was admitted and placed on vancomycin and Zosyn.  Also he had findings of rhabdomyolysis/OKSANA/hypotensive/electrolyte abnormalities.  Patient was fluid resuscitated and vital signs much improved.  He had issues with hypoglycemia in the presence of sepsis that resolved with glucagon and changing fluids to dextrose.  Patient has an anion gap metabolic acidosis with findings of elevated lactic acid/very low CO2/rhabdo and we decided to place him on a sodium bicarb drip.  Overall he is improved.  I reviewed his CT of abdomen since he remains very tender and have decided to place him NPO secondary to fluid-filled  stomach.  We will DC vancomycin and continue Zosyn.  We will continue to replace electrolytes.  His platelets have dropped to around 26,000. We will DC Lovenox workup thrombocytopenia to exclude any causes or than sepsis    3/11/24 Looks improved  from admission . Plt's dropped even further. Peripheral smear with thrombocytopenia with small plt clumps. No active bleeding . Will repeat blood cx's for tomorrow am and will consult ID since pt may need FREEMAN since TTE w poor windows. Hematology consulted for eval of profound thrombocytopenia on a pt with decrease retic count and sepsis. Will start liquid diet . Creatinine is trending down. Ck's at around 3000. Co2  26, will dc sodium b icarbonate drip and continue d5 nss to avoid hypoglycemia . Blood sugars wnl, no further hypoglycemia .  3/12/24 dr hutchinson - looking better/ creat improving as well as plt's. Thrombocytopenia probably related to [place] [id number] from [id number].  Creat at 1.5. abdomen is distended w/o rebound. Will do ct w contrast and continue fluids at 125 cc.   Blood cx's repeated . Lactic acid trendingdown   Plaed NG tube-->  obtaining around 2.5 L of bilious fluid.  CT abdomen with IV/oral contrast was done howing partial small-bowel obstruction/ileus/ascites/anasarca/bilateral pleural effusions.  White blood cell count improving as well as LFTs/renal function.  We will continue to replace electrolytes, potassium.  Blood cultures drawn on 03/12/2024 ntd  Now on rocephin 2 grams   4 mm ground-glass left upper lobe nodule  3/12- CT Abd and pelvis with IV contrast, oral contrast shows finding consistent with partial small-bowel obstruction, consistent with colonic ileus, interval development of large volume ascites, and told development of anasarca edema, bibasilar atelectasis with bilateral pleural effusion     Interval Hx:   Patient is laying in bed.  I had a long conversation with the patient regarding his attitude yesterday as he was threatening the  nurse that he is leaving AMA because nobody is giving him water to drink.  Explain to patient what is going on with his abdomen and why the NG tube is in.  Informed patient that we can only help him if he will not us to help him.  Patient reported he will stay put and comply with the recommendations  NG Tube still in place, general surgery on board  No family at bedside, patient has been made VIP with no visitors allowed  Case was discussed with patient's nurse on the floor.    Objective/physical exam:  General:  Looks ill, NG tube in place  Chest: Clear to auscultation bilaterally anteriorly  Heart:  Tachycardic rate and rhythm, +S1, S2, no appreciable murmur  Abdomen:  Distended but soft, bowel sounds hypoactive  MSK: Warm, no lower extremity edema, no clubbing or cyanosis  Neurologic: Alert and oriented x4, able to move all 4 extremities but extremely weak    VITAL SIGNS: 24 HRS MIN & MAX LAST   Temp  Min: 97.6 °F (36.4 °C)  Max: 98.2 °F (36.8 °C) 98.2 °F (36.8 °C)   BP  Min: 109/78  Max: 119/75 112/71   Pulse  Min: 105  Max: 112  108   Resp  Min: 20  Max: 20 20   SpO2  Min: 93 %  Max: 100 % 96 %     I reviewed the labs below:  Recent Labs   Lab 03/15/24  0417 03/16/24  0638 03/17/24  0511   WBC 17.79* 17.50* 13.92*   RBC 3.58* 3.52* 3.21*   HGB 10.9* 10.6* 9.7*   HCT 32.4* 31.0* 29.3*   MCV 90.5 88.1 91.3   MCH 30.4 30.1 30.2   MCHC 33.6 34.2 33.1   RDW 14.6 14.6 14.6   PLT 92* 119* 166   MPV 14.0* 12.9* 12.9*       Recent Labs   Lab 03/14/24  0409 03/15/24  0417 03/16/24  0638 03/17/24  0511    138 139 144   K 3.0* 3.7 3.3* 3.0*   CO2 32* 33* 31* 37*   BUN 39.0* 32.6* 46.8* 46.7*   CREATININE 1.08 0.99 1.01 0.89   CALCIUM 7.3* 7.2* 7.4* 7.3*   MG 2.00 1.80  --  2.20   ALBUMIN 1.5* 1.3*  --  1.3*   ALKPHOS 134 101  --  99   * 84*  --  68*   * 87*  --  110*   BILITOT 1.6* 1.8*  --  1.9*       Microbiology Results (last 7 days)       Procedure Component Value Units Date/Time    Blood Culture  [[id number]]  (Normal) Collected: 03/12/24 0348    Order Status: Completed Specimen: Blood from Antecubital, Left Updated: 03/17/24 0500     CULTURE, BLOOD ([facility]) No Growth at 5 days    Blood Culture [[id number]]  (Normal) Collected: 03/12/24 0353    Order Status: Completed Specimen: Blood from Hand, Left Updated: 03/17/24 0500     CULTURE, BLOOD ([facility]) No Growth at 5 days    Blood culture #1 **CANNOT BE ORDERED STAT** [[id number]]  (Abnormal)  (Susceptibility) Collected: 03/09/24 0129    Order Status: Completed Specimen: Blood Updated: 03/11/24 1207     CULTURE, BLOOD ([facility]) Streptococcus dysgalactiae ssp equisimilis     GRAM STAIN 1 of 1 Aerobic bottle positive      Gram Positive Cocci, probable Streptococcus      Seen in gram stain of broth only    Blood culture #2 **CANNOT BE ORDERED STAT** [[id number]]  (Abnormal)  (Susceptibility) Collected: 03/09/24 0129    Order Status: Completed Specimen: Blood Updated: 03/11/24 1152     CULTURE, BLOOD ([facility]) Streptococcus dysgalactiae ssp equisimilis     GRAM STAIN 2 of 2 bottles positive      Gram Positive Cocci, probable Streptococcus      Seen in gram stain of broth only    Urine culture [[id number]] Collected: 03/09/24 0217    Order Status: Completed Specimen: Urine Updated: 03/11/24 0916     Urine Culture No Growth    Stool Culture [[id number]]     Order Status: Sent Specimen: Stool              See below for Radiology    Scheduled Med:   cefTRIAXone (Rocephin) IV (PEDS and ADULTS)  2 g Intravenous Q24H    folic acid  1 mg Oral Daily    nystatin  500,000 Units Oral QID      Continuous Infusions:   Amino acid 4.25% - dextrose 5% (CLINIMIX-E) solution (1L provides 42.5 gm AA, 50 gm CHO (170 kcal/L dextrose), Na 35, K 30, Mg 5, Ca 4.5, Acetate 70, Cl 39, Phos 15) 65 mL/hr at 03/17/24 0723    dextrose 5 % and 0.9 % NaCl 60 mL/hr at 03/17/24 0644      PRN Meds:  (Magic mouthwash) 1:1:1 diphenhydrAMINE(Benadryl) 12.5mg/5ml liq, aluminum & magnesium hydroxide-simethicone  (Maalox), LIDOcaine viscous 2%, acetaminophen, aluminum-magnesium hydroxide-simethicone, glucagon (human recombinant), HYDROmorphone, levalbuterol, melatonin, ondansetron, polyethylene glycol, prochlorperazine, senna-docusate 8.6-50 mg, sodium chloride 0.9%         Assessment/Plan:  Severe sepsis secondary to Streptococcus dysgalactiae   Left lower extremity cellulitis   Acute hypoxemic respiratory failure secondary to bilateral pleural effusions  Sinus tachycardia   Partial bowel obstruction secondary to SMA syndrome vs SBO- now with dilated Small bowel loop upto 5.7cm  OKSANA - resolved  Lactic acidosis- resolved  Rhabdomyolysis- resolved  Transaminitis- trending down   Leukocytosis- persist  Hyponatremia/Hypochloremia - resolved  Severe hypokalemia - persist  Hypocalcemia -persist  Intractable hypoglycemia- resolved  Abd pain / small bowel obstruction /ileus  Ascites/anasarca  Thrombocytopenia- resolved  Polysubstance abuse-UDS positive for amphetamine, fentanyl, cannabinoids  Tobacco abuse   Chronic back pain  Moderate malnutrition      ID DR RILEY on board, appreciate recs, recommended may need FREEMAN to r/o thromboembolic phenomena once stable  Zosyn switched to rocephin 2 grams daily to prevent nephrotoxicity, duration of treatment to be determined by Dr RILEY  WBC  17 K  03/12- repeat Blood Cultures x2- negative at day 5  Concern for partial small bowel obstruction/ileus--> per dr Overton note, general surgery evaluated the patient- no intervention, I cannot find the consult note  Reconsulted General surgery given x-ray shows small loop dilation of 5.7 cm now--> appreciate recommendation, recommended to decompress with NG tube which maybe moved shortly.  Recommended to keep NPO for now.  Unlikely mechanical bowel obstruction based on history and imaging  Continue NPO  Continue NG tube low intermittent suction- so far 1100 cc in suction cup  Cont Clinimix E at 65 cc/hour- day 3  Decrease IV fluids to 60 cc/hour  Patient  has persistent hypokalemia, ordered KCl 40 mEq IV x1 again  Tachycardia persist, EKG on 3/15- again shows sinus tachycardia.  Will start metoprolol 12.5 b.i.d. once patient able to take orally.  Currently NPO and with NG tube suction  Hep C Ab +, tx naive- ID aware  Gi pcr- negative  Having multiple bowel leaks, not significant to call it a bowel movement  Appreciate assistance from hematology for his persistent thrombocytopenia, recommended to hold anticoagulation till platelets are greater than 50,000, transfuse 1 unit platelet if platelet drops to less than 10,000 or started bleeding.  Recommend SCDs for VTE prevention in the interim, noted today platelets 166, will start Lovenox 40 mg subQ daily--> monitor platelets closely  Received calcium gluconate x 1 on 3/10--> corrected calcium to albumin is 9.6  AST/ALT again trended up to 110/68  Overall, condition is guarded.  Appreciate all consultants with assist in his case  Morning CBC, cMP ordered    VTE prophylaxis:    SCDs, avoid chem prophylaxis due to thrombocytopenia                      Patient condition:  Guarded    Anticipated discharge and Disposition:   TBD    Critical care note:  Critical care diagnosis:  Prolonged NPO requiring Clinimix infusion, persistent hypokalemia requiring IV KCl infusion  Critical care interventions: Hands-on evaluation, review of labs/radiographs/records and discussion with patient and family if present  Critical care time spent: 35 minutes        All diagnosis and differential diagnosis have been reviewed; assessment and plan has been documented; I have personally reviewed the labs and test results that are presently available; I have reviewed the patients medication list; I have reviewed the consulting providers response and recommendations. I have reviewed or attempted to review medical records based upon their availability    All of the patient's questions have been  addressed and answered. Patient's is agreeable to the  above stated plan. I will continue to monitor closely and make adjustments to medical management as needed.  _____________________________________________________________________    Nutrition Status:  Patient meets ASPEN criteria for moderate malnutrition of acute illness or injury per RD assessment as evidenced by:  Energy Intake (Malnutrition): less than or equal to 50% for greater than or equal to 5 days  Weight Loss (Malnutrition):  (does not meet criteria)  Subcutaneous Fat (Malnutrition):  (does not meet criteria)  Muscle Mass (Malnutrition): mild depletion  Fluid Accumulation (Malnutrition): mild        A minimum of two characteristics is recommended for diagnosis of either severe or non-severe malnutrition.    Radiology:   I have personally reviewed the images and agree with radiologist report  XR Gastric tube check, non-radiologist performed  EXAMINATION:  XR GASTRIC TUBE CHECK, NON-RADIOLOGIST PERFORMED    CLINICAL HISTORY:  pt pulled out resinserted;    TECHNIQUE:  AP View(s) of the abdomen was performed.    COMPARISON:  None.    FINDINGS:  Enteric tube terminates in the stomach.    Partially imaged gas dilated small bowel loops measuring up to 5.7 cm in diameter.    Electronically signed by: Kendal Emery  Date:    03/15/2024  Time:    16:31      Aniceto Montero MD  Department of Hospital Medicine   [facility]   03/17/2024

## 2024-03-17 NOTE — PROGRESS NOTES
"   Acute Care Surgery   Progress Note  Admit Date: 3/9/2024  HD#8  POD#* No surgery found *    Subjective:   Interval history:  NAEO.   AF Tachycardic to 112  Per nursing report, patient requesting significant amounts of ice chips  BM x3/24hrs, reports + flatus  Denies nausea/vomiting/sob  NGT 4.9L/24hrs, gastric contents     Scheduled Meds:   cefTRIAXone (Rocephin) IV (PEDS and ADULTS)  2 g Intravenous Q24H    folic acid  1 mg Oral Daily    nystatin  500,000 Units Oral QID     Continuous Infusions:   Amino acid 4.25% - dextrose 5% (CLINIMIX-E) solution (1L provides 42.5 gm AA, 50 gm CHO (170 kcal/L dextrose), Na 35, K 30, Mg 5, Ca 4.5, Acetate 70, Cl 39, Phos 15) 65 mL/hr at 03/17/24 0723    dextrose 5 % and 0.9 % NaCl 60 mL/hr at 03/17/24 0644     PRN Meds:(Magic mouthwash) 1:1:1 diphenhydrAMINE(Benadryl) 12.5mg/5ml liq, aluminum & magnesium hydroxide-simethicone (Maalox), LIDOcaine viscous 2%, acetaminophen, aluminum-magnesium hydroxide-simethicone, glucagon (human recombinant), HYDROmorphone, levalbuterol, melatonin, ondansetron, polyethylene glycol, prochlorperazine, senna-docusate 8.6-50 mg, sodium chloride 0.9%     Objective:     VITAL SIGNS: 24 HR MIN & MAX LAST   Temp  Min: 97.6 °F (36.4 °C)  Max: 98.2 °F (36.8 °C)  98.2 °F (36.8 °C)   BP  Min: 109/78  Max: 119/75  117/77    Pulse  Min: 105  Max: 112  105    Resp  Min: 20  Max: 20  20    SpO2  Min: 93 %  Max: 100 %  97 %      HT: 5' 10" (177.8 cm)  WT: 81.6 kg (180 lb)  BMI: 25.8     Intake/output:  Intake/Output - Last 3 Shifts         03/15 0700  03/16 0659 03/16 0700 03/17 0659 03/17 0700 03/18 0659    NG/GT  960     Total Intake(mL/kg)  960 (11.8)     Urine (mL/kg/hr) 500 (0.3) 550 (0.3)     Drains 2050 5700     Stool 0 0     Total Output 2550 6250     Net -2550 -5284            Urine Occurrence  1 x     Stool Occurrence 10 x 3 x             Intake/Output Summary (Last 24 hours) at 3/17/2024 1222  Last data filed at 3/17/2024 0553  Gross per 24 hour " "  Intake 960 ml   Output 5350 ml   Net -4390 ml        Lines/drains/airway:       Peripheral IV - Single Lumen 03/16/24 2330 20 G Left;Posterior Wrist (Active)   Number of days: 0            Peripheral IV - Single Lumen 03/16/24 2330 22 G Left;Posterior Forearm (Active)   Number of days: 0            NG/OG Tube 03/12/24 2000 Right nostril (Active)   Securement secured to nostril center w/ adhesive device 03/16/24 2000   Suction Setting/Drainage Method intermittent setting;low 03/16/24 2000   Insertion Site Appearance no redness, warmth, tenderness, skin breakdown, drainage 03/15/24 2000   Intake (mL) 960 mL 03/17/24 0553   Tube Output(mL)(Include Discarded Residual) 800 mL 03/17/24 0553   Number of days: 4       Physical examination:  Gen: ill appearing  HEENT: Ngtube in place, gastric output  CV: RR  Resp: NWOB on NC  Abd: S/NT/ distended  Ext: moving all extremities spontaneously and purposefully  Neuro: CN II-XII grossly intact    Labs:  Renal:  Recent Labs     03/15/24  0417 03/16/24  0638 03/17/24  0511   BUN 32.6* 46.8* 46.7*   CREATININE 0.99 1.01 0.89     No results for input(s): "LACTIC" in the last 72 hours.  FENGI:  Recent Labs     03/15/24  0417 03/16/24  0638 03/17/24  0511    139 144   K 3.7 3.3* 3.0*   CO2 33* 31* 37*   CALCIUM 7.2* 7.4* 7.3*   MG 1.80  --  2.20   PHOS 3.4  --   --    ALBUMIN 1.3*  --  1.3*   BILITOT 1.8*  --  1.9*   AST 87*  --  110*   ALKPHOS 101  --  99   ALT 84*  --  68*     Heme:  Recent Labs     03/15/24  0417 03/16/24  0638 03/17/24  0511   HGB 10.9* 10.6* 9.7*   HCT 32.4* 31.0* 29.3*   PLT 92* 119* 166     ID:  Recent Labs     03/15/24  0417 03/16/24  0638 03/17/24  0511   WBC 17.79* 17.50* 13.92*     CBG:  Recent Labs     03/15/24  0417 03/16/24  0638 03/17/24  0511   GLUCOSE 112* 119* 124*      Cardiovascular:  No results for input(s): "TROPONINI", "CKTOTAL", "CKMB", "BNP" in the last 168 hours.  I have reviewed all pertinent lab results within the past 24 " hours.    Imaging:  XR Gastric tube check, non-radiologist performed   Final Result      XR Gastric tube check, non-radiologist performed   Final Result      CT Abdomen Pelvis With IV Contrast Routine Oral Contrast   Final Result   Abnormal      Findings seen consistent with partial small bowel obstruction.  Follow-up is recommended as complete small-bowel obstruction could be impending.      Findings seen consistent with colonic ileus      Interval development of large volume ascites      Interval development of anasarca edema      Bibasilar atelectasis and bilateral pleural effusions      This report was flagged in Epic as abnormal.         Electronically signed by: Moncho Oneill   Date:    03/12/2024   Time:    14:17      MRI Thoracic Spine Without Contrast   Final Result      Postoperative changes at T11-T12 with chronic appearing cord signal changes and mild canal narrowing.         Electronically signed by: Aye Nath   Date:    03/11/2024   Time:    18:52      MRI Lumbar Spine Without Contrast   Final Result      1. Limited evaluation.   2. Transitional lumbosacral anatomy.   3. Suspected moderate canal stenosis at L1-L4.   4. Multilevel neural foraminal stenoses as described.   5. L2-L3 disc edema with adjacent endplate sclerosis, favored to be degenerative.         Electronically signed by: Aye Nath   Date:    03/11/2024   Time:    18:49      MRI Brain Without Contrast   Final Result      No abnormality seen         Electronically signed by: Moncho Oneill   Date:    03/11/2024   Time:    18:43      US Retroperitoneal Complete   Final Result      1.  No renal abnormality with sonography identified.      2.  Thickened urinary bladder wall.         Electronically signed by: Dontrell Larkin   Date:    03/10/2024   Time:    17:23      X-Ray Abdomen AP 1 View   Final Result      Gaseous filled colon.         Electronically signed by: Dieudonne Mullins MD   Date:    03/10/2024   Time:    16:25      CT  Cervical Spine Without Contrast   Final Result      X-Ray Hand 3 view Right   Final Result      No displaced fracture         Electronically signed by: Chevy Santillan MD   Date:    03/09/2024   Time:    10:28      X-Ray Hand 3 view Left   Final Result      No acute osseous findings         Electronically signed by: Chevy Santillan MD   Date:    03/09/2024   Time:    10:29      CT Chest Abdomen Pelvis Without Contrast (XPD)   Final Result      1. No acute abnormality identified within the chest.   2. Emphysematous changes are present.   3. Urinary bladder wall thickening may reflect under distension, cystitis, or chronic hypertrophic changes related to prostatomegaly.   4. Fluid-filled distended stomach.   5. Moderate colonic stool with ample colonic gas.   6. [employer] concordance.         Electronically signed by: Bobby Maguire MD   Date:    03/09/2024   Time:    10:58      CT Head Without Contrast   Final Result      1. No acute intracranial abnormalities identified.   2. [place] concordance.         Electronically signed by: Bobby Maguire MD   Date:    03/09/2024   Time:    09:56      X-Ray Chest AP Portable   Final Result      No acute findings in the chest         Electronically signed by: Chevy Santillan MD   Date:    03/09/2024   Time:    10:27         I have reviewed all pertinent imaging results/findings within the past 24 hours.    Micro/Path/Other:  Microbiology Results (last 7 days)       Procedure Component Value Units Date/Time    Stool Culture [[id number]] Collected: 03/17/24 0738    Order Status: Sent Specimen: Stool Updated: 03/17/24 0738    Blood Culture [[id number]]  (Normal) Collected: 03/12/24 0348    Order Status: Completed Specimen: Blood from Antecubital, Left Updated: 03/17/24 0500     CULTURE, BLOOD ([facility]) No Growth at 5 days    Blood Culture [[id number]]  (Normal) Collected: 03/12/24 0353    Order Status: Completed Specimen: Blood from Hand, Left Updated: 03/17/24 0500     CULTURE, BLOOD ([facility])  No Growth at 5 days    Blood culture #1 **CANNOT BE ORDERED STAT** [[id number]]  (Abnormal)  (Susceptibility) Collected: 03/09/24 0129    Order Status: Completed Specimen: Blood Updated: 03/11/24 1207     CULTURE, BLOOD ([facility]) Streptococcus dysgalactiae ssp equisimilis     GRAM STAIN 1 of 1 Aerobic bottle positive      Gram Positive Cocci, probable Streptococcus      Seen in gram stain of broth only    Blood culture #2 **CANNOT BE ORDERED STAT** [[id number]]  (Abnormal)  (Susceptibility) Collected: 03/09/24 0129    Order Status: Completed Specimen: Blood Updated: 03/11/24 1152     CULTURE, BLOOD ([facility]) Streptococcus dysgalactiae ssp equisimilis     GRAM STAIN 2 of 2 bottles positive      Gram Positive Cocci, probable Streptococcus      Seen in gram stain of broth only    Urine culture [[id number]] Collected: 03/09/24 0217    Order Status: Completed Specimen: Urine Updated: 03/11/24 0916     Urine Culture No Growth           Pathology Results  (Last 7 days)      None             Assessment & Plan:   54M with complex history as listed above, general surgery consulted for pSBO evaluation. Patient is currently having bowel function with normal abdominal exam.     - no acute surgical intervention recommended at this time   - recommend removal of NG tube  - May replace if patient develops nausea or vomiting  - rest of care per primary team    Wendy Berkowitz MD  [facility] General Surgery, PGY-1    The above findings, diagnostics, and treatment plan were discussed with the physician who will follow with further assessments and recommendations.

## 2024-03-19 LAB — BACTERIA STL CULT: NORMAL
